# Patient Record
Sex: FEMALE | Race: WHITE
[De-identification: names, ages, dates, MRNs, and addresses within clinical notes are randomized per-mention and may not be internally consistent; named-entity substitution may affect disease eponyms.]

---

## 2017-04-26 ENCOUNTER — HOSPITAL ENCOUNTER (OUTPATIENT)
Dept: HOSPITAL 47 - RADUSWWP | Age: 62
Discharge: HOME | End: 2017-04-26
Attending: FAMILY MEDICINE
Payer: COMMERCIAL

## 2017-04-26 DIAGNOSIS — R92.8: Primary | ICD-10-CM

## 2017-04-26 NOTE — USB
Reason for exam: follow-up at short interval from prior study.



History:

Patient is postmenopausal.



Physical Findings:

Nurse did not find any significant physical abnormalities on exam.



US Breast RT

Right breast ultrasound includes all four quadrants, the retroareolar region and 

axilla. Finding demonstrate no cystic or solid lesion seen. Compared to ultrasound

on 9/1/16 previous findings absent.



These results were verbally communicated with the patient and result sheet given 

to the patient on 04/26/17.





ASSESSMENT: Negative, BI-RAD 1



RECOMMENDATION:

Routine screening mammogram of the right breast in 5 months.

Back on schedule.

## 2019-08-14 ENCOUNTER — HOSPITAL ENCOUNTER (OUTPATIENT)
Dept: HOSPITAL 47 - RADUSWWP | Age: 64
Discharge: HOME | End: 2019-08-14
Attending: FAMILY MEDICINE
Payer: COMMERCIAL

## 2019-08-14 DIAGNOSIS — Z88.5: ICD-10-CM

## 2019-08-14 DIAGNOSIS — R94.5: Primary | ICD-10-CM

## 2019-08-14 PROCEDURE — 76705 ECHO EXAM OF ABDOMEN: CPT

## 2019-09-15 ENCOUNTER — HOSPITAL ENCOUNTER (EMERGENCY)
Dept: HOSPITAL 47 - EC | Age: 64
Discharge: HOME | End: 2019-09-15
Payer: COMMERCIAL

## 2019-09-15 VITALS
HEART RATE: 98 BPM | TEMPERATURE: 98.3 F | RESPIRATION RATE: 18 BRPM | SYSTOLIC BLOOD PRESSURE: 142 MMHG | DIASTOLIC BLOOD PRESSURE: 80 MMHG

## 2019-09-15 DIAGNOSIS — Z79.890: ICD-10-CM

## 2019-09-15 DIAGNOSIS — E07.9: ICD-10-CM

## 2019-09-15 DIAGNOSIS — H54.62: ICD-10-CM
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
## 2019-09-15 DIAGNOSIS — M25.551: ICD-10-CM

## 2019-09-15 DIAGNOSIS — M25.511: Primary | ICD-10-CM

## 2019-09-15 DIAGNOSIS — Z79.899: ICD-10-CM

## 2019-09-15 DIAGNOSIS — Z88.6: ICD-10-CM

## 2019-09-15 DIAGNOSIS — F17.200: ICD-10-CM
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
## 2019-09-15 DIAGNOSIS — M25.811: ICD-10-CM

## 2019-09-15 DIAGNOSIS — I10: ICD-10-CM

## 2019-09-15 PROCEDURE — 73502 X-RAY EXAM HIP UNI 2-3 VIEWS: CPT

## 2019-09-15 PROCEDURE — 99283 EMERGENCY DEPT VISIT LOW MDM: CPT

## 2019-09-15 NOTE — XR
EXAMINATION TYPE: XR shoulder complete RT, XR humerus RT

 

DATE OF EXAM: 9/15/2019

 

CLINICAL HISTORY: Pain from fall injury 2 weeks ago.

 

TECHNIQUE:  Three views of the right shoulder are obtained. 2 views of right humerus.

 

COMPARISON: None. 

 

FINDINGS:  There is no acute fracture/dislocation evident in the right shoulder. Some narrowing gleno
humeral joint.  Moderate to severe narrowing of acromial navicular joint with mild to moderate spurri
ng. Distal acromion morphology is maintained. The visualized ribs are intact and unremarkable. 

 

Images of right humerus show no acute fracture or dislocation. Visualized elbow joint is within tonio
l limits. Overlying soft tissue is unremarkable.

 

IMPRESSION:  There is no acute fracture or dislocation in the right shoulder.

## 2019-09-15 NOTE — XR
EXAMINATION TYPE: XR Hip RT and AP Pelvis

 

DATE OF EXAM: 9/15/2019

 

COMPARISON: NONE

 

HISTORY: Pain from fall injury 2 weeks ago. Lifting injury 2 days ago.

 

TECHNIQUE: A single AP view of the pelvis is obtained. Two views of the right hip are obtained.  

 

FINDINGS:  There is no acute fracture/dislocation evident in the pelvis.  The sacroiliac joints are d
ifficult to visualize in their entirety particularly on the left, likely within normal limits.  The o
verlying soft tissue appears unremarkable. Mild axial joint space loss in both hips is present.

 

Two views of right hip show no acute fracture or dislocation.  No focal lytic or sclerotic lesion see
n in the proximal right femur.  The overlying soft tissue is unremarkable.  

 

IMPRESSION:  There is no acute fracture or dislocation in the pelvis or hip.

## 2019-09-15 NOTE — ED
General Adult HPI





- General


Chief complaint: Extremity Injury, Upper


Stated complaint: side pain


Time Seen by Provider: 09/15/19 15:32


Source: patient, RN notes reviewed


Mode of arrival: ambulatory


Limitations: no limitations





- History of Present Illness


Initial comments: 





64-year-old female presents to the emergency department for a chief complaint of

right hip and shoulder pain.  Patient states her shoulder has hurt for 2 weeks. 

States she had a fall where she fell onto her shoulder 2 weeks ago and since 

then has been having pain.  Patient states that she moved into her new apartment

and was moving heavy boxes 2 days ago when she felt the pain in her right hip.  

States that she has been able to walk on it.  Patient has not taken anything for

pain.  Patient denies any fevers or chills.  Denies any numbness or tingling in 

the right arm or right leg.  Denies any midline back pain.  Patient denies any 

weakness of the lower extremities.Patient has no other complaints at this time 

including shortness of breath, chest pain, abdominal pain, nausea or vomiting, 

headache, or visual changes.





- Related Data


                                Home Medications











 Medication  Instructions  Recorded  Confirmed


 


Hydrochlorothiazide 25 mg PO DAILY 06/15/16 06/16/16


 


Levothyroxine Sodium [Synthroid] 50 mcg PO DAILY 06/15/16 06/16/16


 


amLODIPine BESYLATE [Amlodipine 10 mg PO DAILY 06/15/16 06/16/16





Besylate]   








                                  Previous Rx's











 Medication  Instructions  Recorded


 


Doxepin [SINEquan] 50 mg PO HS PRN #60 cap 06/28/16


 


Doxepin [SINEquan] 100 mg PO HS #120 cap 06/28/16


 


Gabapentin [Neurontin] 600 mg PO BID #120 cap 06/28/16


 


OXcarbazepine [Trileptal] 450 mg PO BID #180 tab 06/28/16


 


Ziprasidone [Geodon] 80 mg PO BID-W/MEALS #60 cap 06/28/16











                                    Allergies











Allergy/AdvReac Type Severity Reaction Status Date / Time


 


ibuprofen [From Motrin] Allergy  Swelling Verified 09/15/19 15:28


 


NSAIDS (Non-Steroidal Allergy  Rash/Hives Verified 09/15/19 15:28





Anti-Inflamma     














Review of Systems


ROS Statement: 


Those systems with pertinent positive or pertinent negative responses have been 

documented in the HPI.





ROS Other: All systems not noted in ROS Statement are negative.





Past Medical History


Past Medical History: Hypertension, Thyroid Disorder


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


History of Any Multi-Drug Resistant Organisms: None Reported


Past Surgical History: Tubal Ligation


Past Anesthesia/Blood Transfusion Reactions: No Reported Reaction


Past Psychological History: Anxiety, Bipolar, Depression, Schizophrenia


Smoking Status: Current every day smoker


Past Alcohol Use History: None Reported


Past Drug Use History: None Reported





- Past Family History


  ** Father


Family Medical History: Unable to Obtain


Additional Family Medical History / Comment(s): Father is alive at age 86 with 

history of diabetes.





  ** Mother


Additional Family Medical History / Comment(s): Mother is alive at age 85 with 

history of hypertension, temporal arteritis, peripheral vascular disease status 

post carotid surgery, blindness in the left eye.





  ** Sister(s)


Additional Family Medical History / Comment(s): Patient has 4 sisters with no 

major medical problems.  Patient has 3 daughters with no major medical problems.





General Exam





- General Exam Comments


Initial Comments: 





Right shoulder: Patient has full range of motion of the right shoulder.  It skin

is intact.  No tenderness noted of the right shoulder.  No bruising or 

ecchymosis.  Radial pulse 2+ and capillary refill less than 2 seconds.   

strength 5 out of 5.  Sensation intact in right upper extremity.


Right hip: Patient has full range motion of the right hip and is ambulatory 

without limping or pain.  No erythema.  Skin is intact in normal-appearing.  DP 

and PT pulses 2+.  Capillary refill less than 2 seconds. 


Limitations: no limitations


General appearance: alert, in no apparent distress


Head exam: Present: atraumatic, normocephalic, normal inspection


Eye exam: Present: normal appearance, PERRL, EOMI.  Absent: scleral icterus, 

conjunctival injection, periorbital swelling


ENT exam: Present: normal exam, mucous membranes moist


Neck exam: Present: normal inspection, full ROM.  Absent: tenderness, 

meningismus, lymphadenopathy


Respiratory exam: Present: normal lung sounds bilaterally.  Absent: respiratory 

distress, wheezes, rales, rhonchi, stridor


Cardiovascular Exam: Present: regular rate, normal rhythm, normal heart sounds. 

Absent: systolic murmur, diastolic murmur, rubs, gallop, clicks


Neurological exam: Present: alert





Course


                                   Vital Signs











  09/15/19





  15:28


 


Temperature 98.3 F


 


Pulse Rate 98


 


Respiratory 18





Rate 


 


Blood Pressure 142/80


 


O2 Sat by Pulse 97





Oximetry 














Medical Decision Making





- Medical Decision Making





64-year-old female presents for right shoulder pain 2 weeks after a fall and 

right hip pain 2 days after moving boxes.  No back pain.  HPI is documented.  

No fevers.  Patient is ambulatory.  Physical exam is unremarkable.  Vitals are 

stable.  Patient is well-appearing.  X-ray of the right shoulder shows no acute 

fracture or dislocation.  There is moderate to severe narrowing of the 

acromioclavicular joint which may be contributing to patient's pain.  X-ray of 

the right hip and pelvis shows no acute fracture or dislocation.  At this time 

pain likely muscular in nature.  Patient has an ALLERGY to Motrin but will take 

Tylenol at home.  Discussed parameters of Tylenol use.  Discussed following up 

with orthopedics in one to 2 days or return here if she has any worsening 

symptoms.





Disposition


Clinical Impression: 


 Shoulder pain, right, Hip pain, right





Disposition: HOME SELF-CARE


Condition: Good


Instructions (If sedation given, give patient instructions):  Shoulder Pain 

(ED), Hip Pain (ED)


Additional Instructions: 


Please take Tylenol for pain.  Please follow-up with primary care and 

orthopedics in 1-2 days.  Please return to the emergency department if you have 

any worsening symptoms.


Is patient prescribed a controlled substance at d/c from ED?: No


Referrals: 


Brendon Jacobs Jr, DO [Primary Care Provider] - 1-2 days


Marlo Smith DO [Doctor of Osteopathic Medicine] - 1-2 days


Time of Disposition: 16:26

## 2019-10-25 ENCOUNTER — HOSPITAL ENCOUNTER (INPATIENT)
Dept: HOSPITAL 47 - EC | Age: 64
LOS: 27 days | Discharge: HOME | DRG: 885 | End: 2019-11-21
Attending: PSYCHIATRY & NEUROLOGY | Admitting: PSYCHIATRY & NEUROLOGY
Payer: COMMERCIAL

## 2019-10-25 VITALS — BODY MASS INDEX: 37.8 KG/M2

## 2019-10-25 DIAGNOSIS — Z82.49: ICD-10-CM

## 2019-10-25 DIAGNOSIS — F25.0: Primary | ICD-10-CM

## 2019-10-25 DIAGNOSIS — E78.00: ICD-10-CM

## 2019-10-25 DIAGNOSIS — E03.9: ICD-10-CM

## 2019-10-25 DIAGNOSIS — E66.9: ICD-10-CM

## 2019-10-25 DIAGNOSIS — Z83.3: ICD-10-CM

## 2019-10-25 DIAGNOSIS — Z79.899: ICD-10-CM

## 2019-10-25 DIAGNOSIS — Z83.518: ICD-10-CM

## 2019-10-25 DIAGNOSIS — Z98.51: ICD-10-CM

## 2019-10-25 DIAGNOSIS — Z79.890: ICD-10-CM

## 2019-10-25 DIAGNOSIS — E78.2: ICD-10-CM

## 2019-10-25 DIAGNOSIS — Z81.8: ICD-10-CM

## 2019-10-25 DIAGNOSIS — I10: ICD-10-CM

## 2019-10-25 DIAGNOSIS — H54.62: ICD-10-CM

## 2019-10-25 DIAGNOSIS — F17.210: ICD-10-CM

## 2019-10-25 PROCEDURE — 80061 LIPID PANEL: CPT

## 2019-10-25 PROCEDURE — 82075 ASSAY OF BREATH ETHANOL: CPT

## 2019-10-25 PROCEDURE — 99285 EMERGENCY DEPT VISIT HI MDM: CPT

## 2019-10-25 PROCEDURE — 96372 THER/PROPH/DIAG INJ SC/IM: CPT

## 2019-10-25 PROCEDURE — 84443 ASSAY THYROID STIM HORMONE: CPT

## 2019-10-25 PROCEDURE — 82248 BILIRUBIN DIRECT: CPT

## 2019-10-25 PROCEDURE — 80053 COMPREHEN METABOLIC PANEL: CPT

## 2019-10-25 PROCEDURE — 85025 COMPLETE CBC W/AUTO DIFF WBC: CPT

## 2019-10-25 PROCEDURE — 83036 HEMOGLOBIN GLYCOSYLATED A1C: CPT

## 2019-10-25 NOTE — ED
Psych HPI





- General


Source: patient


Mode of arrival: ambulatory


Limitations: altered mental status (Patient is manic)





- History of Present Illness


MD Complaint: other


-: unknown


Associated Psychiatric Symptoms: racing thoughts, delusions


Quality: constant


Improves With: none


Worsens With: none





<Tyrell Lei - Last Filed: 10/25/19 05:50>





<Cruzito Amos - Last Filed: 10/25/19 13:43>





- General


Chief Complaint: Psychiatric Symptoms


Stated Complaint: Mental Health


Time Seen by Provider: 10/25/19 05:20





- History of Present Illness


Initial Comments: 





This patient is 64-year-old woman who presents with complaint that the which is 

on warlocks won't stop fighting with her.  She is not able to state how long 

this is been going on.  She states that she is not able to rest at home as there

continuously fighting with her.  The patient denies suicidal or homicidal 

ideation. (Tyrell Lei)





- Related Data


                                Home Medications











 Medication  Instructions  Recorded  Confirmed


 


Levothyroxine Sodium [Synthroid] 50 mcg PO DAILY 06/15/16 10/25/19


 


amLODIPine BESYLATE [Amlodipine 10 mg PO DAILY 06/15/16 10/25/19





Besylate]   


 


Atenolol [Tenormin] 50 mg PO DAILY 10/25/19 10/25/19


 


Atorvastatin Calcium [Lipitor] 10 mg PO DAILY 10/25/19 10/25/19











                                    Allergies











Allergy/AdvReac Type Severity Reaction Status Date / Time


 


ibuprofen [From Motrin] Allergy  Swelling Verified 10/25/19 10:29


 


NSAIDS (Non-Steroidal Allergy  Rash/Hives Verified 10/25/19 10:29





Anti-Inflamma     














Review of Systems


ROS Other: All systems not noted in ROS Statement are negative.


Limitations: ROS unobtainable due to patients medical condition (Patient appears

manic)


Respiratory: Denies: cough, dyspnea


Cardiovascular: Denies: chest pain, palpitations


Gastrointestinal: Denies: abdominal pain, vomiting


Musculoskeletal: Denies: back pain


Neurological: Denies: headache


Psychiatric: Reports: anxiety.  Denies: auditory hallucinations, visual 

hallucinations, homicidal thoughts, suicidal thoughts





<Tyrell Lei - Last Filed: 10/25/19 05:50>


ROS Other: All systems not noted in ROS Statement are negative.





<Cruzito Amos - Last Filed: 10/25/19 13:43>


ROS Statement: 


Those systems with pertinent positive or pertinent negative responses have been 

documented in the HPI.








Past Medical History


Past Medical History: Hypertension, Thyroid Disorder


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


History of Any Multi-Drug Resistant Organisms: None Reported


Past Surgical History: Tubal Ligation


Past Anesthesia/Blood Transfusion Reactions: No Reported Reaction


Past Psychological History: Anxiety, Bipolar, Depression, Schizophrenia


Smoking Status: Current every day smoker


Past Alcohol Use History: None Reported


Past Drug Use History: None Reported





- Past Family History


  ** Father


Family Medical History: Unable to Obtain


Additional Family Medical History / Comment(s): Father is alive at age 86 with 

history of diabetes.





  ** Mother


Additional Family Medical History / Comment(s): Mother is alive at age 85 with 

history of hypertension, temporal arteritis, peripheral vascular disease status 

post carotid surgery, blindness in the left eye.





  ** Sister(s)


Additional Family Medical History / Comment(s): Patient has 4 sisters with no 

major medical problems.  Patient has 3 daughters with no major medical problems.





<Tyrell Lei - Last Filed: 10/25/19 05:50>





General Exam


Limitations: no limitations


General appearance: alert, anxious


Head exam: Present: atraumatic, normocephalic


Eye exam: Present: normal appearance


ENT exam: Present: normal oropharynx


Respiratory exam: Present: normal lung sounds bilaterally.  Absent: respiratory 

distress, wheezes, rales, rhonchi, stridor


Cardiovascular Exam: Present: normal rhythm, tachycardia, normal heart sounds.  

Absent: systolic murmur, diastolic murmur, rubs, gallop


GI/Abdominal exam: Present: soft.  Absent: tenderness, guarding


Neurological exam: Present: alert


Psychiatric exam: Present: anxious, manic.  Absent: flat affect, homicidal 

ideation, suicidal ideation


Skin exam: Present: warm, dry, intact, normal color.  Absent: rash





<Tyrell Lei - Last Filed: 10/25/19 05:50>





Course





                                   Vital Signs











  10/25/19 10/25/19 10/25/19





  05:30 07:25 08:16


 


Temperature 97.6 F  


 


Pulse Rate 121 H 115 H 


 


Respiratory 20 18 16





Rate   


 


Blood Pressure 150/100 160/81 


 


O2 Sat by Pulse 98 98 





Oximetry   














  10/25/19





  11:00


 


Temperature 


 


Pulse Rate 


 


Respiratory 





Rate 


 


Blood Pressure 


 


O2 Sat by Pulse 98





Oximetry 














Medical Decision Making





<Amos,Cruzito - Last Filed: 10/25/19 13:43>





- Medical Decision Making





Patient was seen by mental health services with plans for admission.  Patient 

reevaluated by myself, Dr. Amos.  Positive clinical certificate completed.  

Patient admits to not taking her medication.  Patient admits to not sleeping.  

Patient does have flight of ideas. (Cruzito Amos)





Disposition





<Tyrell Lei - Last Filed: 10/25/19 05:50>


Is patient prescribed a controlled substance at d/c from ED?: No


Decision Time: 13:43





<Cruzito Amos - Last Filed: 10/25/19 13:43>


Clinical Impression: 


 Acute psychosis





Disposition: TRANSFER TO PSYCH HOSP/UNIT


Referrals: 


Brendon Jacobs Jr,  [Primary Care Provider] - 1-2 days

## 2019-10-26 LAB
ALBUMIN SERPL-MCNC: 4.2 G/DL (ref 3.5–5)
ALP SERPL-CCNC: 88 U/L (ref 38–126)
ALT SERPL-CCNC: 61 U/L (ref 9–52)
ANION GAP SERPL CALC-SCNC: 12 MMOL/L
AST SERPL-CCNC: 63 U/L (ref 14–36)
BASOPHILS # BLD AUTO: 0 K/UL (ref 0–0.2)
BASOPHILS NFR BLD AUTO: 1 %
BILIRUB INDIRECT SERPL-MCNC: 0.9 MG/DL (ref 0–1.1)
BILIRUBIN DIRECT+TOT PNL SERPL-MCNC: 0.4 MG/DL (ref 0–0.2)
BUN SERPL-SCNC: 13 MG/DL (ref 7–17)
CALCIUM SPEC-MCNC: 9.4 MG/DL (ref 8.4–10.2)
CHLORIDE SERPL-SCNC: 104 MMOL/L (ref 98–107)
CHOLEST SERPL-MCNC: 166 MG/DL (ref ?–200)
CO2 SERPL-SCNC: 23 MMOL/L (ref 22–30)
EOSINOPHIL # BLD AUTO: 0.1 K/UL (ref 0–0.7)
EOSINOPHIL NFR BLD AUTO: 2 %
ERYTHROCYTE [DISTWIDTH] IN BLOOD BY AUTOMATED COUNT: 4.65 M/UL (ref 3.8–5.4)
ERYTHROCYTE [DISTWIDTH] IN BLOOD: 12.9 % (ref 11.5–15.5)
GLUCOSE SERPL-MCNC: 95 MG/DL (ref 74–99)
HBA1C MFR BLD: 5.7 % (ref 4–6)
HCT VFR BLD AUTO: 43.9 % (ref 34–46)
HDLC SERPL-MCNC: 39 MG/DL (ref 40–60)
HGB BLD-MCNC: 14.7 GM/DL (ref 11.4–16)
LDLC SERPL CALC-MCNC: 106 MG/DL (ref 0–99)
LYMPHOCYTES # SPEC AUTO: 2 K/UL (ref 1–4.8)
LYMPHOCYTES NFR SPEC AUTO: 32 %
MCH RBC QN AUTO: 31.7 PG (ref 25–35)
MCHC RBC AUTO-ENTMCNC: 33.6 G/DL (ref 31–37)
MCV RBC AUTO: 94.3 FL (ref 80–100)
MONOCYTES # BLD AUTO: 0.4 K/UL (ref 0–1)
MONOCYTES NFR BLD AUTO: 6 %
NEUTROPHILS # BLD AUTO: 3.4 K/UL (ref 1.3–7.7)
NEUTROPHILS NFR BLD AUTO: 56 %
PLATELET # BLD AUTO: 179 K/UL (ref 150–450)
POTASSIUM SERPL-SCNC: 4.1 MMOL/L (ref 3.5–5.1)
PROT SERPL-MCNC: 7.7 G/DL (ref 6.3–8.2)
SODIUM SERPL-SCNC: 139 MMOL/L (ref 137–145)
TRIGL SERPL-MCNC: 103 MG/DL (ref ?–150)
WBC # BLD AUTO: 6.1 K/UL (ref 3.8–10.6)

## 2019-10-26 RX ADMIN — ATORVASTATIN CALCIUM SCH MG: 10 TABLET, FILM COATED ORAL at 10:47

## 2019-10-26 RX ADMIN — ATENOLOL SCH MG: 50 TABLET ORAL at 10:47

## 2019-10-26 RX ADMIN — LEVOTHYROXINE SODIUM SCH MCG: 50 TABLET ORAL at 07:15

## 2019-10-26 NOTE — P.CONS
History of Present Illness





- Reason for Consult


Consult date: 10/26/19


Medical management of hypertension hyperlipidemia hypothyroidism





- History of Present Illness


This is a pleasant 64-year-old  female, who was hospitalized for 

delusions.  She has a medical history of hypertension, hyperlipidemia, and 

hypothyroidism.  I've been asked to see her for medical management is diseases. 

She is pleasant and extremely happy at this time.  She denies any significant 

complaints.  Staff have no significant medical concerns about her at this time. 

Routine laboratory studies showed mildly elevated liver function tests.








Review of Systems


All systems: negative





Past Medical History


Past Medical History: Hypertension, Thyroid Disorder


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


History of Any Multi-Drug Resistant Organisms: None Reported


Past Surgical History: Tubal Ligation


Past Anesthesia/Blood Transfusion Reactions: No Reported Reaction


Smoking Status: Current every day smoker





- Past Family History


  ** Father


Family Medical History: Unable to Obtain


Additional Family Medical History / Comment(s): Father is alive at age 86 with 

history of diabetes.





  ** Mother


Additional Family Medical History / Comment(s): Mother is alive at age 85 with 

history of hypertension, temporal arteritis, peripheral vascular disease status 

post carotid surgery, blindness in the left eye.





  ** Sister(s)


Additional Family Medical History / Comment(s): Patient has 4 sisters with no 

major medical problems.  Patient has 3 daughters with no major medical problems.





Medications and Allergies


                                Home Medications











 Medication  Instructions  Recorded  Confirmed  Type


 


Levothyroxine Sodium [Synthroid] 50 mcg PO DAILY 06/15/16 10/25/19 History


 


amLODIPine BESYLATE [Amlodipine 10 mg PO DAILY 06/15/16 10/25/19 History





Besylate]    


 


Atenolol [Tenormin] 50 mg PO DAILY 10/25/19 10/25/19 History


 


Atorvastatin Calcium [Lipitor] 10 mg PO DAILY 10/25/19 10/25/19 History








                                    Allergies











Allergy/AdvReac Type Severity Reaction Status Date / Time


 


ibuprofen [From Motrin] Allergy  Swelling Verified 10/25/19 17:08


 


NSAIDS (Non-Steroidal Allergy  Rash/Hives Verified 10/25/19 17:08





Anti-Inflamma     














Physical Exam


Vitals: 


                                   Vital Signs











  Temp Pulse Pulse Resp BP Pulse Ox


 


 10/26/19 06:26  97.7 F   105 H  18  164/77 


 


 10/25/19 18:27  97 F L   110 H  15  140/95  95


 


 10/25/19 17:38  98 F   110 H  20  140/95  99


 


 10/25/19 16:48  97 F L   110 H  15   95


 


 10/25/19 16:30  97.6 F   112 H  20  185/78  98


 


 10/25/19 15:56   81   16   95








                                Intake and Output











 10/25/19 10/26/19 10/26/19





 22:59 06:59 14:59


 


Other:   


 


  Weight 103.2 kg  














GENERAL: Well-appearing, obese female in no acute distress.


HEAD: Atraumatic, normocephalic.


EYES: Pupils equal round and reactive to light, extraocular movements intact, 

sclera anicteric, conjunctiva are normal.


ENT:nares patent, oropharynx clear without exudates.  Moist mucous membranes.


NECK: Normal range of motion, supple without lymphadenopathy or JVD, no 

thyromegaly


LUNGS: Breath sounds clear to auscultation bilaterally and equal.  No wheezes 

rales or rhonchi.


HEART: Regular rate and rhythm without murmurs, rubs or gallops.S1S2 Normal


ABDOMEN: Soft, nontender, normoactive bowel sounds.  No guarding, no rebound.  

No masses appreciated.


EXTREMITIES: Normal range of motion, no pitting or edema.  No clubbing or 

cyanosis.


NEUROLOGICAL: Cranial nerves II through XII grossly intact.  Normal speech, 

normal gait.


PSYCH:  Significantly elevated mood, normal affect.


SKIN: Warm, Dry, normal turgor, no rashes or lesions noted.





Results


CBC & Chem 7: 


                                 10/26/19 08:41





                                 10/26/19 08:41


Labs: 


                  Abnormal Lab Results - Last 24 Hours (Table)











  10/26/19 Range/Units





  08:41 


 


Delta Bilirubin  0.4 H  (0.0-0.2)  mg/dL


 


AST  63 H  (14-36)  U/L


 


ALT  61 H  (9-52)  U/L


 


LDL Cholesterol, Calc  106 H  (0-99)  mg/dL


 


HDL Cholesterol  39 L  (40-60)  mg/dL














Assessment and Plan


(1) Essential (primary) hypertension


Current Visit: Yes   Status: Acute   Code(s): I10 - ESSENTIAL (PRIMARY) 

HYPERTENSION   SNOMED Code(s): 78014739


   





(2) Mixed hyperlipidemia


Current Visit: Yes   Status: Acute   Code(s): E78.2 - MIXED HYPERLIPIDEMIA   

SNOMED Code(s): 088888208


   





(3) Hypothyroidism


Current Visit: Yes   Status: Acute   Code(s): E03.9 - HYPOTHYROIDISM, 

UNSPECIFIED   SNOMED Code(s): 84635244


   





(4) Abnormal liver function test


Current Visit: Yes   Status: Acute   Code(s): R94.5 - ABNORMAL RESULTS OF LIVER 

FUNCTION STUDIES   SNOMED Code(s): 045477206


   





(5) Acute psychosis


Current Visit: Yes   Status: Acute   Code(s): F23 - BRIEF PSYCHOTIC DISORDER   

SNOMED Code(s): 80132035


   





(6) Schizophrenia


Current Visit: No   Status: Acute   Code(s): F20.9 - SCHIZOPHRENIA, UNSPECIFIED 

 SNOMED Code(s): 77888608


   


Plan: 





We'll repeat liver function tests, she'll be restarted on her medication of 

atenolol and amlodipine for hypertension along with atorvastatin for 

hyperlipidemia and levothyroxine for hypothyroidism.


Thank you for allowing me to participate in her care.

## 2019-10-26 NOTE — P.HP
Psychiatric H&P





- .


H&P Date: 10/26/19


History & Physical: 


                                    Allergies











Allergy/AdvReac Type Severity Reaction Status Date / Time


 


ibuprofen [From Motrin] Allergy  Swelling Verified 10/25/19 17:08


 


NSAIDS (Non-Steroidal Allergy  Rash/Hives Verified 10/25/19 17:08





Anti-Inflamma     








                                   Vital Signs











Temp  97.7 F   10/26/19 06:26


 


Pulse  105 H  10/26/19 06:26


 


Resp  18   10/26/19 06:26


 


BP  164/77   10/26/19 06:26


 


Pulse Ox  95   10/25/19 18:27








                                 Intake & Output











 10/25/19 10/26/19 10/26/19





 18:59 06:59 18:59


 


Weight 103.2 kg  








                             Laboratory Last Values











WBC  6.1 k/uL (3.8-10.6)   10/26/19  08:41    


 


RBC  4.65 m/uL (3.80-5.40)   10/26/19  08:41    


 


Hgb  14.7 gm/dL (11.4-16.0)   10/26/19  08:41    


 


Hct  43.9 % (34.0-46.0)   10/26/19  08:41    


 


MCV  94.3 fL (80.0-100.0)   10/26/19  08:41    


 


MCH  31.7 pg (25.0-35.0)   10/26/19  08:41    


 


MCHC  33.6 g/dL (31.0-37.0)   10/26/19  08:41    


 


RDW  12.9 % (11.5-15.5)   10/26/19  08:41    


 


Plt Count  179 k/uL (150-450)   10/26/19  08:41    


 


Neutrophils %  56 %  10/26/19  08:41    


 


Lymphocytes %  32 %  10/26/19  08:41    


 


Monocytes %  6 %  10/26/19  08:41    


 


Eosinophils %  2 %  10/26/19  08:41    


 


Basophils %  1 %  10/26/19  08:41    


 


Neutrophils #  3.4 k/uL (1.3-7.7)   10/26/19  08:41    


 


Lymphocytes #  2.0 k/uL (1.0-4.8)   10/26/19  08:41    


 


Monocytes #  0.4 k/uL (0-1.0)   10/26/19  08:41    


 


Eosinophils #  0.1 k/uL (0-0.7)   10/26/19  08:41    


 


Basophils #  0.0 k/uL (0-0.2)   10/26/19  08:41    


 


Sodium  139 mmol/L (137-145)   10/26/19  08:41    


 


Potassium  4.1 mmol/L (3.5-5.1)   10/26/19  08:41    


 


Chloride  104 mmol/L ()   10/26/19  08:41    


 


Carbon Dioxide  23 mmol/L (22-30)   10/26/19  08:41    


 


Anion Gap  12 mmol/L  10/26/19  08:41    


 


BUN  13 mg/dL (7-17)   10/26/19  08:41    


 


Creatinine  0.66 mg/dL (0.52-1.04)   10/26/19  08:41    


 


Est GFR (CKD-EPI)AfAm  >90  (>60 ml/min/1.73 sqM)   10/26/19  08:41    


 


Est GFR (CKD-EPI)NonAf  >90  (>60 ml/min/1.73 sqM)   10/26/19  08:41    


 


Glucose  95 mg/dL (74-99)   10/26/19  08:41    


 


Calcium  9.4 mg/dL (8.4-10.2)   10/26/19  08:41    


 


Total Bilirubin  1.3 mg/dL (0.2-1.3)   10/26/19  08:41    


 


Conjugated Bilirubin  0.0 mg/dL (0.0-0.3)   10/26/19  08:41    


 


Unconjugated Bilirubin  0.9 mg/dL (0.0-1.1)   10/26/19  08:41    


 


Delta Bilirubin  0.4 mg/dL (0.0-0.2)  H  10/26/19  08:41    


 


AST  63 U/L (14-36)  H  10/26/19  08:41    


 


ALT  61 U/L (9-52)  H  10/26/19  08:41    


 


Alkaline Phosphatase  88 U/L ()   10/26/19  08:41    


 


Total Protein  7.7 g/dL (6.3-8.2)   10/26/19  08:41    


 


Albumin  4.2 g/dL (3.5-5.0)   10/26/19  08:41    


 


Triglycerides  103 mg/dL (<150)   10/26/19  08:41    


 


Cholesterol  166 mg/dL (<200)   10/26/19  08:41    


 


LDL Cholesterol, Calc  106 mg/dL (0-99)  H  10/26/19  08:41    


 


HDL Cholesterol  39 mg/dL (40-60)  L  10/26/19  08:41    


 


TSH  0.681 mIU/L (0.465-4.680)   10/26/19  08:41    











10/26/19 16:04


IDENTIFYING DATA: 64-year-old   female patient.


HPI: Admitted to the inpatient psychiatric unit on an involuntary basis, 

petition done by registered nurse stating "patient believes that witches and 

warlocks are trying to drug, rape and prostitute us.  She said she fights them 

every day."  Patient reports that she has been battling then goes on to state 

"witches, warlocks sorcerers."  She states that it's a constant heath.  She 

says she feels good when there is a lot of people around her that can direct 

her, but every day is a fight.  She states that she's been sleeping only about 

an hour.  She does her mood lately as great here in great when she hears music. 

When talking about past overdoses she relays that "that's a part of sorcery, 

they're casting spells on you."  Regarding taking medication she says "I'm going

to trust the Lord."  She relays that she is open to trying something else.


PAST PSYCHIATRIC HISTORY: She has a history of multiple overdoses it sounds 

like, no suicide attempts.  She denies any current psychiatrist or counselor.  

She says she has been hospitalized a lot.  She says that she feels much better 

not on medication.  She says she did very well on Prozac.  She did not like 

Depakote or lithium and Lamictal made her want to sleep.  Says she has diagnosis

history of schizophrenia and bipolar disorder.  She does admit to some history 

of hallucinations, auditory hallucinations she says that are the wrong kind.


PMH: Hypertension, thyroid disorder, hypercholesterolemia


ALLERGIES: Ibuprofen, NSAIDs


MEDICATIONS: Maalox when necessary, Norvasc, Tenormin, Lipitor, Synthroid, 

Ativan when necessary, Geodon when necessary


CHEMICAL DEPENDENCY HISTORY: Denies


FAMILY PSYCHIATRIC HISTORY: Relays her schizophrenia and bipolar disorder in the

family.  She says her sister does well on Abilify.


FAMILY CHEMICAL DEPENDENCY HISTORY: Not known at this time.


SOCIAL HISTORY: Relays that she lives by herself, does make reference to living 

with her daughter.  She has been  one time and .  She has 3 

daughters.  She is on Social Security disability.


MENTAL STATUS EXAM: She is alert and cooperative with the interview.  Her speech

is fluent, not necessarily showing any significant rapidity or pressured speech.

 She appears tearful at times.  Regarding her mood she says "great here."  She 

denies any auditory or visual hallucinations.  She denies any bothersome or 

paranoid type thoughts.  She does describe feeling as though she's been battling

witches, warlocks and sorcerers.  She makes reference to previous overdoses 

relating "that's a part of sorcery, they are casting spells on you."  She denies

any thoughts of harm to self or others.  Cognitively she appears to be grossly 

intact.  I do not note any significant disorientation or significant memory 

disturbance.  Her insight has some limitations her judgment shows evidence of 

recent impairment.


STRENGTHS/WEAKNESSES: Strengths-agreeable to initiate some medication treatment;

 weaknesses-recent noncompliance with treatment


INTELLECTUAL FUNCTIONING: Average


IMPRESSIONS: Schizoaffective disorder, bipolar type


PLAN: Patient be admitted to the inpatient psychiatric unit [facility] on involuntary basis.  Second clinical CERT is done.  Abilify will be 

initiated 10 mg daily to help with him psychosis symptoms as well as mood 

stability.  Baseline laboratory workup will be done the patient and medical 

consultation will be ordered.  We will look into support systems.  She will be 

offered multiple different group and activity therapies.  Estimated length of 

stay is 5-7 days.  Prognosis is guarded.  She is placed on SP 15 minute 

precautions.

## 2019-10-27 LAB
ALBUMIN SERPL-MCNC: 4.2 G/DL (ref 3.5–5)
ALP SERPL-CCNC: 97 U/L (ref 38–126)
ALT SERPL-CCNC: 57 U/L (ref 9–52)
ANION GAP SERPL CALC-SCNC: 12 MMOL/L
AST SERPL-CCNC: 53 U/L (ref 14–36)
BUN SERPL-SCNC: 17 MG/DL (ref 7–17)
CALCIUM SPEC-MCNC: 9.4 MG/DL (ref 8.4–10.2)
CHLORIDE SERPL-SCNC: 105 MMOL/L (ref 98–107)
CO2 SERPL-SCNC: 23 MMOL/L (ref 22–30)
GLUCOSE SERPL-MCNC: 106 MG/DL (ref 74–99)
POTASSIUM SERPL-SCNC: 4 MMOL/L (ref 3.5–5.1)
PROT SERPL-MCNC: 7.7 G/DL (ref 6.3–8.2)
SODIUM SERPL-SCNC: 140 MMOL/L (ref 137–145)

## 2019-10-27 RX ADMIN — ATENOLOL SCH MG: 50 TABLET ORAL at 08:16

## 2019-10-27 RX ADMIN — LEVOTHYROXINE SODIUM SCH MCG: 50 TABLET ORAL at 06:19

## 2019-10-27 RX ADMIN — ATORVASTATIN CALCIUM SCH MG: 10 TABLET, FILM COATED ORAL at 08:16

## 2019-10-27 NOTE — P.PN
Progress Note - Text


Progress Note Date: 10/27/19





Patient is seen in cross coverage in today.  She reports that she didn't sleep 

much last night.  She feels like the Abilify is making her feel tired.  She 

makes reference that at times some seeing people that other people can't see, 

relays a doesn't really bother her.





Mental status exam: She is alert and cooperative with the interview.  She laughs

at times during the session.  She describes her mood as "tired."  She denies any

thoughts of harm to self or others.  She admits to visual hallucinations of 

seeing people that other people can't see at times.  She does not verbalize any 

auditory hallucinations.  She was seen in the hallway yesterday seated in a 

chair and saying some repetitive things.





Plan: Patient will be maintained on Abilify, we'll change the scheduling 2 at 

bedtime to minimize any daytime sedation and perhaps this will help her sleep at

night.  Continue to monitor for any medication side effects and monitor her 

ongoing response to treatment.

## 2019-10-28 RX ADMIN — LEVOTHYROXINE SODIUM SCH MCG: 50 TABLET ORAL at 06:06

## 2019-10-28 RX ADMIN — ATORVASTATIN CALCIUM SCH MG: 10 TABLET, FILM COATED ORAL at 08:19

## 2019-10-28 RX ADMIN — ATENOLOL SCH MG: 50 TABLET ORAL at 08:19

## 2019-10-28 NOTE — P.PN
Progress Note - Text





Interval history: The patient is found in group she follows me to an interview 

room.  She states that her mood is fine.  She was admitted for acute symptoms of

psychosis.  I reviewed the EPS assessment as well as a psychiatric evaluation.  

She was brought in reporting she had concerns about witches and warlocks.  She 

is religiously preoccupied.  She indicates today that she felt unsafe at home 

but feels safe in the hospital.  She was seen by Dr. Zaragoza and Abilify 10 mg

daily was initiated.  She states that she is never been on that medication but 

had been on Geodon invega and lithium in the past.  She states her sister is 

treated with Abilify and that works well for her.  The patient indicates that 

she is eating and sleeping.





Mental status exam: The patient is alert she is dressed in her own clothing she 

has a disheveled appearance her shirt is sustained.  She is carrying some 

belongings with her.  Eye contact is appropriate affect is overly bright.  

Several times she states "God bless you in your family".  She indicates having 

concerns about witches and warlocks but does not provide any specific 

information.  Thought process is somewhat disorganized at times with brief 

questions she can be more linear.  She demonstrates no verbal or physical 

aggressiveness.  She reports no suicidal or homicidal ideation intent or plan.  

Insight and judgment are impaired.





Plan: The patient will continue on the Abilify we will titrate the dose to 15 mg

daily to further address her acute symptoms of psychosis.  We will monitor her 

for safety.  She is encouraged to continue participating in groups attend her 

ADLs.  We will monitor vital signs.  Blood pressure elevated we will continue to

monitor.  She requires continued psychiatric hospitalization.

## 2019-10-29 RX ADMIN — LEVOTHYROXINE SODIUM SCH MCG: 50 TABLET ORAL at 10:00

## 2019-10-29 RX ADMIN — ATORVASTATIN CALCIUM SCH MG: 10 TABLET, FILM COATED ORAL at 10:00

## 2019-10-29 RX ADMIN — ATENOLOL SCH MG: 50 TABLET ORAL at 10:01

## 2019-10-29 NOTE — P.PN
Progress Note - Text





Interval history: The patient is found in group she follows me to an interview 

room.  She indicates she is worried and concerned.  She states she is tired.  

Staff reported she slept only 3 hours.  She is very concerned about being unsafe

due to sorcery and witchcraft.  She states that she hopes she can remember all 

the codes that she will need when she leaves.  She reports that she is also 

experiencing positive input from the Lord which helps reassure her.  She 

deferred during her meeting with her  yesterday.  She has been c

ooperative with group participation as well as medications.





Mental status exam: The patient's an overweight female appearing her stated age.

 She is alert she stressor own clothing.  She identifies a mood of being tired 

and fearful.  She becomes tearful when describing concerns of sorcery and 

witchcraft.  She feels that those phenomenon endanger her.  Nonspecifically she 

refers to codes that she will need to remember and she is afraid she will forget

them.  Insight and judgment are impaired by his symptoms of psychosis.  She 

demonstrates no verbal or physical aggressiveness.  She is cooperative and 

easily directed.  She reports no suicidal or homicidal ideation intent or plan. 

Thought process can be linear but also can become tangential was spontaneous 

speech.  She endorses racing thoughts.  Affect can be mildly labile.





Plan: The patient will continue on her current psychotropic medication.  We will

monitor for symptoms of psychosis and we'll monitor for her ability to maintain 

her ADLs.  Sleep has been impaired in the evening we will monitor that further. 

She is encouraged to fully participate in groups.  Vital signs reviewed.  She 

requires continued psychiatric hospitalization due to her acute psychosis.

## 2019-10-30 RX ADMIN — ATENOLOL SCH MG: 50 TABLET ORAL at 09:40

## 2019-10-30 RX ADMIN — LEVOTHYROXINE SODIUM SCH MCG: 50 TABLET ORAL at 05:49

## 2019-10-30 RX ADMIN — Medication SCH MG: at 21:12

## 2019-10-30 RX ADMIN — ATORVASTATIN CALCIUM SCH MG: 10 TABLET, FILM COATED ORAL at 09:40

## 2019-10-30 NOTE — P.PN
Progress Note - Text





Interval history: The patient is found in group she follows me to an interview 

room.  She states that she slept last night however staff reports she slept 2 

hours.  She indicates that she is napping during the day.  She states she does 

this because she has to pray during the night.  Appetite stable.  She reports 

feeling safe appear but continues to have thoughts of sorcery.  She goes on a 

tangent about not walking on people's graves because this can cause the dead to 

rise.  She has no questions or concerns regarding her medication.





Mental status exam: The patient is an overweight female appearing her stated 

age.  She is pleasant cooperative.  Affect is overly bright.  Her speech is 

mildly pressured.  She often states "God bless you".  Thought process lacks 

organization at times.  She states at times she feels confused.  She has a 

bright smiling affect that is expansive.  She reports no suicidal or homicidal 

ideation.  She is endorsing auditory hallucinations in the form of hearing the 

Lord's voice and other unspecified voices.  She continues to have some paranoid 

and persecutory thinking.  Insight and judgment are impaired.





Plan: The patient will continue on the Abilify we will likely plan to titrate 

that further this week.  We will add melatonin 5 mg at bedtime to assist with 

sleep.  We will monitor her for safety.  Vital signs reviewed blood pressure is 

fluctuating.  She is encouraged to continue participating in all groups.  She 

requires continued psychiatric hospitalization.

## 2019-10-31 RX ADMIN — Medication SCH MG: at 20:00

## 2019-10-31 RX ADMIN — ATORVASTATIN CALCIUM SCH MG: 10 TABLET, FILM COATED ORAL at 09:32

## 2019-10-31 RX ADMIN — LEVOTHYROXINE SODIUM SCH MCG: 50 TABLET ORAL at 07:06

## 2019-10-31 RX ADMIN — ATENOLOL SCH MG: 50 TABLET ORAL at 09:32

## 2019-10-31 NOTE — P.PN
Progress Note - Text





Interval history: The patient is found in group she follows me to an interview 

room.  She indicates she is doing fine.  She first indicates that she is 

troubled that it is halloween but later states she is fine with that.  She 

reports feeling safe in the hospital.  Continues to have concerns regarding 

witchcraft and sorcery.  She felt she slept throughout the night staff reported 

she slept 2 hours.  She has been compliant with the Abilify.  She believes that 

the Abilify will help her as her sister is on that medication.





Mental status exam: The patient is an overweight female appearing her stated 

age.  She is dressed in her own clothing.  Hygiene grooming adequate.  Eye 

contact is appropriate.  She is more guarded today in conversation.  She has 

less spontaneous speech.  She provides more brief answers.  She continues to 

endorse a delusional thought content.  There are continues to be disorganization

of thought.  She can become tangential at times.  Affect is somewhat variable.  

She will have exaggerated laughter at times she is almost tearful twice during 

the interaction.  She demonstrates no verbal or physical aggressiveness.  She 

demonstrates no involuntary repetitive movements.  Insight and judgment are 

impaired.  She reports no suicidal or homicidal ideation intent or plan.  She is

oriented to person place and date.





Plan: The patient will continue on the Abilify we will titrate the dose to 20 mg

daily.  We will continue to monitor her for safety.  Vital signs reviewed.  She 

is encouraged to fully participate in the milieu.  She continues to be acutely 

symptomatic and requires inpatient psychiatric care.

## 2019-11-01 RX ADMIN — LEVOTHYROXINE SODIUM SCH MCG: 50 TABLET ORAL at 05:57

## 2019-11-01 RX ADMIN — ATENOLOL SCH MG: 50 TABLET ORAL at 09:19

## 2019-11-01 RX ADMIN — Medication SCH MG: at 20:17

## 2019-11-01 RX ADMIN — ATORVASTATIN CALCIUM SCH MG: 10 TABLET, FILM COATED ORAL at 09:19

## 2019-11-01 NOTE — P.PN
Progress Note - Text





Interval history: The patient is found in her room sleeping she refuses to get 

up in follows me to an interview room.  Staff reported she slept only 1 hour 

last evening.  She is known to take naps throughout the day however.  She has 

been compliant with the Abilify.  We briefly reviewed other mood stabilizers she

has trialed in the past.  She states she refuses to take lithium or Depakote 

again.  She does not recall trying Lamictal and is agreeable to starting that 

this evening.





Mental status exam: The patient is lying in bed she was sleeping but was 

verbally arousable.  She is covered in sheets in a blanket.  Eye contact is 

intermittent.  She remains hyperverbal.  She was observed earlier this morning 

ambulating in the hallway without difficulty.  She was hyperverbal at the front 

desk.  Thought process can be disorganized at times.  She continues to have a 

delusional thought content and is fearful for her safety.  She reports no 

suicidal or homicidal ideation.  Insight and judgment limited.  She demonstrates

no verbal or physical aggressiveness.  She demonstrates no repetitive 

involuntary movements.





Plan: The patient will continue on the Abilify at its current dose.  We will 

consider titrating further.  We will initiate Lamictal 25 mg at bedtime and 

we'll titrate that further.  We're adding the Lamictal to assist the Abilify in 

stabilizing mood.  The patient requires continued psychiatric hospitalization 

for safety reasons.  We will monitor her for safety and encourage full 

participation in the milieu.  She is encouraged not to sleep during the day so 

as to improve her sleep behavior at night.  Vital signs reviewed.

## 2019-11-02 RX ADMIN — LEVOTHYROXINE SODIUM SCH MCG: 50 TABLET ORAL at 06:09

## 2019-11-02 RX ADMIN — ATENOLOL SCH MG: 50 TABLET ORAL at 08:12

## 2019-11-02 RX ADMIN — Medication SCH MG: at 20:41

## 2019-11-02 RX ADMIN — ATORVASTATIN CALCIUM SCH MG: 10 TABLET, FILM COATED ORAL at 08:12

## 2019-11-02 NOTE — P.PN
Progress Note - Text


Progress Note Date: 11/02/19





IDENTIFICATION DATA: 


64-year-old female admitted to the inpatient psychiatric unit on an involuntary 

basis with acute psychosis.





INTERVAL HISTORY:  


She reports feeling anxious saying am I going to fail  . She also reports 

seeing a ball going up and down inside her room. Other wise she says her 

medications have been helping her and has been going to all her assigned groups.

She reports good sleep and appetite. She denies current symptoms of depression. 

No side effects reported. 





MENTAL STATUS EXAMINATION: 


64  year old woman. She is obese with magianal grooming. Her speech and thought 

process are goal directed. Her mood is reported as rested and affect broad. She 

denies current auditory hallucinations reports  visual hallucinations. Denies 

paranoid ideations. Denies current suicidal or homicidal ideations. She is alert

and oriented x 4. Her insight and judgement are improving. 





ASSESSMENT 


Schizoaffective disorder, bipolar type





 PLAN: 


Continue 


Abilify 20mg po daily


Lamictal 25mg po qhs


Melatonin 5mg po qhs

## 2019-11-03 RX ADMIN — LEVOTHYROXINE SODIUM SCH MCG: 50 TABLET ORAL at 06:15

## 2019-11-03 RX ADMIN — Medication SCH MG: at 20:51

## 2019-11-03 RX ADMIN — ATENOLOL SCH MG: 50 TABLET ORAL at 07:58

## 2019-11-03 RX ADMIN — ATORVASTATIN CALCIUM SCH MG: 10 TABLET, FILM COATED ORAL at 07:58

## 2019-11-03 NOTE — P.PN
Progress Note - Text


Progress Note Date: 11/03/19





IDENTIFICATION DATA: 


64-year-old female admitted to the inpatient psychiatric unit on an involuntary 

basis with acute psychosis.





INTERVAL HISTORY:  


She says she doesnt sleep well at night and says they say I slept only for 

hours, you have to check with them, She however reports feeling rested when she

wakes up next day. She says she doesnt wake up in the morning and sleeps 

through late in the day but unable to tell how late. She reports good appetite. 

She says she likes going to groups. She reports seeing and hearing people 

talking and making hissing noises and creating lot of confusion inside her room.

She denies paranoid ideations. She denies current symptoms of depression and 

denies current suicidal or homicidal ideations. She says she is complaint with 

medications and no side effects reported. She says there is no luck in [place] 

[place], only blessings in [facility]. 





MENTAL STATUS EXAMINATION: 


64  year old woman. She is obese with fair grooming and hygiene . Her speech and

thought process are goal directed. Her mood is reported as good  and affect cons

tricted. She reports  auditory hallucinations and   visual hallucinations. 

Denies paranoid ideations. Denies current suicidal or homicidal ideations. She 

is alert and oriented x 4. Her insight and judgement are improving. 





ASSESSMENT 


Schizoaffective disorder, bipolar type





 PLAN: 


She continues to meet criteria for inpatient hospitalization


Continue 


Abilify 20mg po daily


Lamictal 25mg po qhs


Melatonin 5mg po qhs

## 2019-11-04 RX ADMIN — ATORVASTATIN CALCIUM SCH MG: 10 TABLET, FILM COATED ORAL at 09:37

## 2019-11-04 RX ADMIN — ACETAMINOPHEN PRN MG: 325 TABLET, FILM COATED ORAL at 21:56

## 2019-11-04 RX ADMIN — LEVOTHYROXINE SODIUM SCH MCG: 50 TABLET ORAL at 05:55

## 2019-11-04 RX ADMIN — ATENOLOL SCH MG: 50 TABLET ORAL at 09:37

## 2019-11-04 RX ADMIN — ACETAMINOPHEN PRN MG: 325 TABLET, FILM COATED ORAL at 16:21

## 2019-11-04 RX ADMIN — Medication SCH MG: at 21:57

## 2019-11-04 NOTE — P.PN
Progress Note - Text





Interval history: The patient is found in the front that she follows me to an 

interview room.  She indicates her mood is fine today.  Staff reported that over

the weekend she tried to insert a pen into an electrical outlet.  The patient 

states she has no reason for doing it but it's what she had to do.  She 

continues to sleep poorly in the evening.  She continues to be religiously 

preoccupied she demonstrates some disorganization of her thoughts.  She has no 

questions or concerns regarding medications.  No side effects reported.





Mental status exam: The patient is an obese female appearing her stated age.  

She is dressed in hospital gowns.  She is mildly disheveled.  Eye contact is 

appropriate she is pleasant cooperative.  She continues to verbalize Judaism 

type delusions with some disorganized delusional thought.  She frequently states

"God bless you and your family".  Her symptoms of psychosis impair her insight 

and judgment.  She demonstrates no verbal or physical aggressiveness.  She has a

smiling affect throughout our interaction.  She demonstrates no involuntary 

repetitive movements.





Plan: The patient will continue on her current medication however I will titrate

the Abilify to 30 mg daily.  We have just started the Lamictal we will titrate 

that further as well.  We will monitor her for safety and encourage 

participation in the milieu.  She requires continued psychiatric hospitalization

for acute safety reasons.

## 2019-11-05 RX ADMIN — ACETAMINOPHEN PRN MG: 325 TABLET, FILM COATED ORAL at 11:11

## 2019-11-05 RX ADMIN — ATENOLOL SCH MG: 50 TABLET ORAL at 08:51

## 2019-11-05 RX ADMIN — ACETAMINOPHEN PRN MG: 325 TABLET, FILM COATED ORAL at 04:46

## 2019-11-05 RX ADMIN — LEVOTHYROXINE SODIUM SCH MCG: 50 TABLET ORAL at 06:59

## 2019-11-05 RX ADMIN — ATORVASTATIN CALCIUM SCH MG: 10 TABLET, FILM COATED ORAL at 08:51

## 2019-11-05 RX ADMIN — ACETAMINOPHEN PRN MG: 325 TABLET, FILM COATED ORAL at 18:26

## 2019-11-05 NOTE — P.PN
Progress Note - Text





Interval history: The patient is found in her room she is asked to follow me to 

an interview room.  She stops out of her room wearing only a blanket with parts 

of her body exposed.  She is instructed to return to her room and she is brought

more hospital gowns.  The patient indicates she slept last night staff reports 

she slept 2 hours.  She demonstrated disorganized thoughts.  She has no 

questions or concerns regarding her psychotropic medication.





Mental status exam: The patient is an obese female appearing her stated age.  

She is alert.  She demonstrates disorganization of thought and some confusion.  

She endorses concerns regarding witchcraft and sorcery.  She indicates feeling 

unsafe at times.  She is reporting no suicidal or homicidal thoughts.  She 

demonstrates symptoms of danielle.  Insight and judgment are poor.  She 

demonstrates no verbal or physical aggressiveness.  She has been directable.  

Affect is labile.





Plan: The patient will be continued on her current psychotropic medication we 

will plan to titrate the Lamictal further.  We will continue the Abilify is 

written.  She requires continued psychiatric hospitalization for safety reasons.

 Vital signs reviewed.  We will encourage appropriate participation in the 

milieu.

## 2019-11-06 RX ADMIN — ACETAMINOPHEN PRN MG: 325 TABLET, FILM COATED ORAL at 02:00

## 2019-11-06 RX ADMIN — ATENOLOL SCH MG: 50 TABLET ORAL at 09:08

## 2019-11-06 RX ADMIN — PALIPERIDONE SCH MG: 3 TABLET, EXTENDED RELEASE ORAL at 21:25

## 2019-11-06 RX ADMIN — Medication SCH MG: at 21:25

## 2019-11-06 RX ADMIN — ACETAMINOPHEN PRN MG: 325 TABLET, FILM COATED ORAL at 10:35

## 2019-11-06 RX ADMIN — ATORVASTATIN CALCIUM SCH MG: 10 TABLET, FILM COATED ORAL at 09:08

## 2019-11-06 RX ADMIN — Medication SCH: at 09:39

## 2019-11-06 RX ADMIN — LEVOTHYROXINE SODIUM SCH MCG: 50 TABLET ORAL at 06:12

## 2019-11-06 NOTE — P.PN
Progress Note - Text





Interval history: The patient is found in the hallway she follows me to an 

interview room.  She states that she is "heath wary" when asked to clarify she 

begins making statements that are paranoid in nature.  We reviewed her current 

psychotropic medications.  It appears that the Abilify has demonstrated very 

little efficacy over the course of her admission.  We discussed transitioning 

her off of Abilify onto invega and she is agreeable.  She indicates that she is 

eating well.  She is attempting to attend groups.  Staff report that she 

continues to demonstrate the same psychotic thoughts and behavior is when she 

was admitted.





Mental status exam: The patient is an overweight female appearing her stated 

age.  She is dressed in layers today.  Eye contact is good.  She does have a 

pressured quality in terms of speech.  She demonstrates some tangential 

thinking.  She spontaneously describes paranoid and persecutory thinking.  In

sight and judgment are impaired.  She demonstrates no verbal or physical 

aggressiveness.  She demonstrates no involuntary repetitive movements.  She 

reports no suicidal or homicidal ideation intent or plan.  She requires 

redirection on the mental health unit.





Plan: The patient has demonstrated insufficient response to the Abilify over the

course of this admission.  We will cross taper her off of Abilify and initiate 

invega 3 mg at bedtime.  We will continue the Lamictal as written and consider 

titrating that further.  The patient requires continued psychiatric 

hospitalization due to the dysfunction caused by her psychosis and manic 

symptoms.  We will monitor for safety.  Vital signs reviewed.

## 2019-11-07 RX ADMIN — ACETAMINOPHEN PRN MG: 325 TABLET, FILM COATED ORAL at 22:59

## 2019-11-07 RX ADMIN — ATENOLOL SCH MG: 50 TABLET ORAL at 08:51

## 2019-11-07 RX ADMIN — Medication SCH MG: at 21:09

## 2019-11-07 RX ADMIN — PALIPERIDONE SCH MG: 3 TABLET, EXTENDED RELEASE ORAL at 21:10

## 2019-11-07 RX ADMIN — ACETAMINOPHEN PRN MG: 325 TABLET, FILM COATED ORAL at 16:49

## 2019-11-07 RX ADMIN — ATORVASTATIN CALCIUM SCH MG: 10 TABLET, FILM COATED ORAL at 08:51

## 2019-11-07 RX ADMIN — LEVOTHYROXINE SODIUM SCH MCG: 50 TABLET ORAL at 06:04

## 2019-11-07 RX ADMIN — ACETAMINOPHEN PRN MG: 325 TABLET, FILM COATED ORAL at 03:05

## 2019-11-07 NOTE — P.PN
Progress Note - Text





Interval history: The patient is found in the hallway she follows me to an 

interview room.  She again did not sleep well last night but does nap throughout

the day.  We started to cross taper her off of Abilify and placed her on an 

invega starting last evening.  She has no questions or concerns regarding her 

medication.  She states that she continues to attend groups.  She is religiously

preoccupied.  She states that she is still "heath wary".





Mental status exam: The patient is an overweight female appearing her stated 

age.  She is dressed in her own clothing wearing layers.  She is carrying a 

blanket with her as well.  She is pleasant cooperative easily directed.  She 

demonstrates some disorganization of thought process specifically she is 

tangential.  She demonstrates Mu-ism preoccupation as well as paranoid and 

persecutory thinking.  She reports no suicidal or homicidal ideation.  Again her

symptoms of psychosis impair her insight and judgment.  She demonstrates no 

verbal or physical aggressiveness.  She has an expansive affect.  She 

demonstrates no involuntary repetitive movements.





Plan: The patient will continue on her current psychotropic medications.  We 

will continue to adjust the Invega and Abilify as part of the cross titration 

process.  We will monitor her for safety we will monitor her sleep at night.  

She is encouraged to participate in the milieu.  Vital signs reviewed they're 

within normal limits.

## 2019-11-08 RX ADMIN — ACETAMINOPHEN PRN MG: 325 TABLET, FILM COATED ORAL at 14:55

## 2019-11-08 RX ADMIN — Medication SCH MG: at 20:31

## 2019-11-08 RX ADMIN — ATORVASTATIN CALCIUM SCH MG: 10 TABLET, FILM COATED ORAL at 09:16

## 2019-11-08 RX ADMIN — PALIPERIDONE SCH MG: 6 TABLET, EXTENDED RELEASE ORAL at 20:31

## 2019-11-08 RX ADMIN — LEVOTHYROXINE SODIUM SCH MCG: 50 TABLET ORAL at 06:05

## 2019-11-08 RX ADMIN — ACETAMINOPHEN PRN MG: 325 TABLET, FILM COATED ORAL at 05:05

## 2019-11-08 RX ADMIN — ACETAMINOPHEN PRN MG: 325 TABLET, FILM COATED ORAL at 20:31

## 2019-11-08 RX ADMIN — ATENOLOL SCH MG: 50 TABLET ORAL at 09:16

## 2019-11-08 NOTE — P.PN
Progress Note - Text





Interval history: The patient is found in group she follows me to an interview 

room.  She reports some frustration with her peers.  She indicates one of the 

male peers is "up to no good" when she is asked about her safety.  She finds 

herself antagonized by a female peer and states "she thinks she's my doctor".  

She thinks that the Lamictal is causing weight gain.  We discussed this concern.

 We again discussed the plan to cross taper off of Abilify and onto invega.  She

offers no opposition.  She indicates she slept better last night staff reported 

she slept 3 hours.





Mental status exam: The patient is an obese female appearing her stated age.  

She is dressed in hospital gowns and is covered with a bedsheet.  She has a 

bright smiling affect that is expansive.  She has exaggerated laughter at times.

 She reports concerns about being unsafe at times.  She reports feelings of 

frustration and irritability related to peers.  She reports no suicidal or 

homicidal ideation.  She does continue to describe paranoid and persecutory 

thinking.  He demonstrates no verbal or physical aggressiveness.  She 

demonstrates no involuntary repetitive movements.  Insight and judgment are 

still adversely impacted by current symptoms of psychosis.





Plan: We will reduce the Abilify to 5 mg daily and we will increase the Invega 

to 6 mg at bedtime.  We will monitor her for safety and encourage participation 

in the milieu.  She has not demonstrated sufficient improvement and requires 

continued psychiatric hospitalization.  She would be unsafe if discharged to a 

lesser level of care.  Vital signs reviewed.

## 2019-11-09 RX ADMIN — ACETAMINOPHEN PRN MG: 325 TABLET, FILM COATED ORAL at 19:10

## 2019-11-09 RX ADMIN — PALIPERIDONE SCH MG: 6 TABLET, EXTENDED RELEASE ORAL at 20:46

## 2019-11-09 RX ADMIN — ACETAMINOPHEN PRN MG: 325 TABLET, FILM COATED ORAL at 01:31

## 2019-11-09 RX ADMIN — ATORVASTATIN CALCIUM SCH MG: 10 TABLET, FILM COATED ORAL at 09:28

## 2019-11-09 RX ADMIN — Medication SCH MG: at 20:46

## 2019-11-09 RX ADMIN — LEVOTHYROXINE SODIUM SCH MCG: 50 TABLET ORAL at 05:13

## 2019-11-09 RX ADMIN — ACETAMINOPHEN PRN MG: 325 TABLET, FILM COATED ORAL at 09:28

## 2019-11-09 RX ADMIN — ATENOLOL SCH MG: 50 TABLET ORAL at 09:27

## 2019-11-09 NOTE — P.PN
Progress Note - Text





Interval history: The patient is found in her room she was sleeping she follows 

me to an interview room.  She indicates she feels tired.  She has no questions 

regarding her psychotropic medications and we again reviewed the upcoming 

changes.  Again she did not sleep at night and she is sleeping during the day.  

She indicates that she ate breakfast.  She has not been attending groups this 

morning.  She describes feelings of being unsafe even on the mental health unit.





Mental status exam: The patient is an obese female appearing her stated age.  

She is dressed in hospital gowns.  She is pleasant cooperative she is a smiling 

affect throughout the session.  She continues to have delusional thought mainly 

any paranoid and persecutory fashion as well as Restorationist preoccupation.  She is

endorsing no auditory or visual hallucinations.  She is reporting no suicidal or

homicidal ideation.  Insight and judgment remain impaired.  She continues to be 

distractible in terms of thought process.  She can demonstrate some tangential 

thinking at times.  She demonstrates no verbal or physical aggressiveness she 

demonstrates no involuntary repetitive movements.





Plan: The patient will continue on her current psychotropic medication.  We will

discontinue the Abilify this weekend.  We will titrate the Lamictal to 50 mg at 

bedtime.  We will monitor her for safety and encourage participation in the 

milieu.  She requires continued psychiatric hospitalization for symptoms of 

danielle and psychosis.  Idle signs reviewed.

## 2019-11-10 RX ADMIN — ACETAMINOPHEN PRN MG: 325 TABLET, FILM COATED ORAL at 09:10

## 2019-11-10 RX ADMIN — ATORVASTATIN CALCIUM SCH MG: 10 TABLET, FILM COATED ORAL at 09:10

## 2019-11-10 RX ADMIN — ACETAMINOPHEN PRN MG: 325 TABLET, FILM COATED ORAL at 16:53

## 2019-11-10 RX ADMIN — ACETAMINOPHEN PRN MG: 325 TABLET, FILM COATED ORAL at 03:55

## 2019-11-10 RX ADMIN — PALIPERIDONE SCH MG: 6 TABLET, EXTENDED RELEASE ORAL at 20:35

## 2019-11-10 RX ADMIN — ACETAMINOPHEN PRN MG: 325 TABLET, FILM COATED ORAL at 21:52

## 2019-11-10 RX ADMIN — LEVOTHYROXINE SODIUM SCH MCG: 50 TABLET ORAL at 05:52

## 2019-11-10 RX ADMIN — ATENOLOL SCH MG: 50 TABLET ORAL at 09:09

## 2019-11-10 RX ADMIN — Medication SCH MG: at 20:36

## 2019-11-10 NOTE — P.PN
Progress Note - Text





Interval history: The patient is found in her room she follows me to an 

interview room.  She indicates her mood is good.  She states that she was out of

her room this morning I have not seen her much this afternoon so far.  She was 

recorded to a slept 4 hours last evening which is the most she slept at night 

during this hospitalization.  She has no questions or concerns regarding the 

medication.  She states that she was looking through a magazine and ripped out 

and add for a cell phone and put it under her pillow.  She asks me today if that

was meant for her.





Mental status exam: The patient is an overweight female appearing her stated 

age.  She is dressed in hospital gowns and is covered with a bedsheet.  She is 

pleasant cooperative and easily directable.  Eye contact is appropriate speech 

is fluent and spontaneous nonpressured.  She reports no suicidal or homicidal 

ideation intent or plan.  She demonstrates no verbal or physical aggressiveness.

 She demonstrates no involuntary repetitive movements.  She does not 

spontaneously discussed the topic but states she is still concerned about 

witchcraft and sorcery.  She did spontaneously asked me about an idea of 

reference.  Insight and judgment still limited.





Plan: The patient is demonstrating some mild improvement in that she is speaking

of her symptoms of psychosis less spontaneously.  We will continue the invega as

written.  We will monitor for any side effects.  She is encouraged to fully 

participate in the milieu.  We will monitor her for safety.  We will continue 

monitoring vital signs.

## 2019-11-11 RX ADMIN — ATENOLOL SCH MG: 50 TABLET ORAL at 08:52

## 2019-11-11 RX ADMIN — PALIPERIDONE SCH MG: 6 TABLET, EXTENDED RELEASE ORAL at 20:27

## 2019-11-11 RX ADMIN — Medication SCH MG: at 20:27

## 2019-11-11 RX ADMIN — ATORVASTATIN CALCIUM SCH MG: 10 TABLET, FILM COATED ORAL at 08:52

## 2019-11-11 RX ADMIN — ACETAMINOPHEN PRN MG: 325 TABLET, FILM COATED ORAL at 04:05

## 2019-11-11 RX ADMIN — LEVOTHYROXINE SODIUM SCH MCG: 50 TABLET ORAL at 06:28

## 2019-11-11 RX ADMIN — IBUPROFEN PRN MG: 200 TABLET, FILM COATED ORAL at 10:50

## 2019-11-11 RX ADMIN — ACETAMINOPHEN PRN MG: 325 TABLET, FILM COATED ORAL at 23:22

## 2019-11-11 NOTE — P.PN
Progress Note - Text





Interval history: The patient is found in the [facility] in group she follows 

me to an interview room.  She states that she is having more significant 

arthritic pain throughout her body.  She typically uses Advil at home.  In terms

of mood She reports feeling good today.  Staff reported she slept 3 hours.  She 

has been eating.  She has no questions or concerns regarding her psychotropic 

medication. 





Mental status exam: The patient is an overweight female appearing her stated 

age.  She is dressed in hospital gowns and wearing a sheet over top.  She is 

pleasant cooperative.  Eye contact is appropriate.  She is smiling throughout 

the session she will have some exaggerated laughter at times.  She reports no 

suicidal or homicidal ideation intent or plan.  She endorses no auditory or 

visual hallucinations although they may persist.  When asked about her previous 

thoughts of feeling unsafe she states "no those are gone now".  Again minutes 

likely she is underreporting.  She demonstrates no tangential thinking during 

our brief session today.  No reports of any behavioral disturbance.  Insight and

judgment slowly improving.  She demonstrates no verbal or physical 

aggressiveness she demonstrates no involuntary repetitive movements.





Plan: The patient will continue on her current medication.  We will continue to 

evaluate her acute safety risk.  She reports no thoughts of self injury but just

a week ago she had demonstrated some unsafe behavior that was concerning.  I 

will consider titrating the Invega further if needed.  Vital signs are reviewed 

they're within normal limits.

## 2019-11-12 RX ADMIN — Medication SCH MG: at 20:40

## 2019-11-12 RX ADMIN — IBUPROFEN PRN MG: 200 TABLET, FILM COATED ORAL at 05:53

## 2019-11-12 RX ADMIN — ATENOLOL SCH MG: 50 TABLET ORAL at 09:01

## 2019-11-12 RX ADMIN — LEVOTHYROXINE SODIUM SCH MCG: 50 TABLET ORAL at 05:51

## 2019-11-12 RX ADMIN — ACETAMINOPHEN PRN MG: 325 TABLET, FILM COATED ORAL at 16:21

## 2019-11-12 RX ADMIN — IBUPROFEN PRN MG: 200 TABLET, FILM COATED ORAL at 12:59

## 2019-11-12 RX ADMIN — PALIPERIDONE SCH MG: 6 TABLET, EXTENDED RELEASE ORAL at 20:40

## 2019-11-12 RX ADMIN — ACETAMINOPHEN PRN MG: 325 TABLET, FILM COATED ORAL at 09:02

## 2019-11-12 RX ADMIN — ATORVASTATIN CALCIUM SCH MG: 10 TABLET, FILM COATED ORAL at 09:00

## 2019-11-12 NOTE — P.PN
Progress Note - Text





Interval history: The patient is found in group she follows me to an interview 

room.  She reports she is doing well.  She slept only 2 hours last night but 

again she does sleep during the day.  When asked about racing thoughts she 

states that she does feel pressured to do everything on time.  She states that 

she figured out the "phone thing" referring to the idea of reference she spoke 

of yesterday.  She remains compliant with her medication.  We discussed 

transitioning to Invega Sustenna and she is agreeable.  She has given social 

work a contact for a different daughter, Tish.





Mental status exam: The patient is an overweight female appearing her stated 

age.  She reports that her mood is good affect is bright and expansive.  She is 

frequently smiling during the interaction.  She reports no suicidal or homicidal

ideation intent or plan.  She does have some tangential thinking.  She 

demonstrates no verbal or physical aggressiveness.  She demonstrates no 

involuntary repetitive movements.  Insight and judgment improved during the 

hospitalization but still limited.  She is endorsing no auditory or visual 

hallucinations.  She does continue to have some paranoid and persecutory 

thinking but she does not spontaneously describe it.





Plan: The patient will continue on invega we will consider transitioning her to 

the Invega Sustenna injection.  Social work is trying to contact a family 

member.  We will continue to monitor the patient for safety she is encouraged 

participate in the milieu.  She requires continued psychiatric hospitalization 

while she stabilizes.  Vital signs reviewed.

## 2019-11-13 RX ADMIN — IBUPROFEN PRN MG: 200 TABLET, FILM COATED ORAL at 06:06

## 2019-11-13 RX ADMIN — Medication SCH MG: at 21:16

## 2019-11-13 RX ADMIN — ACETAMINOPHEN PRN MG: 325 TABLET, FILM COATED ORAL at 01:13

## 2019-11-13 RX ADMIN — LEVOTHYROXINE SODIUM SCH MCG: 50 TABLET ORAL at 06:04

## 2019-11-13 RX ADMIN — ATENOLOL SCH MG: 50 TABLET ORAL at 09:19

## 2019-11-13 RX ADMIN — IBUPROFEN PRN MG: 200 TABLET, FILM COATED ORAL at 23:35

## 2019-11-13 RX ADMIN — ACETAMINOPHEN PRN MG: 325 TABLET, FILM COATED ORAL at 21:16

## 2019-11-13 RX ADMIN — PALIPERIDONE SCH MG: 3 TABLET, EXTENDED RELEASE ORAL at 21:16

## 2019-11-13 RX ADMIN — ATORVASTATIN CALCIUM SCH MG: 10 TABLET, FILM COATED ORAL at 09:19

## 2019-11-13 RX ADMIN — IBUPROFEN PRN MG: 200 TABLET, FILM COATED ORAL at 15:40

## 2019-11-13 NOTE — P.PN
Progress Note - Text





Interval history: The patient is found in the hallway she follows me to an 

interview room.  She indicates she had a rougher day yesterday due to other 

patients negative attitudes during group.  Otherwise she continues to feel that 

she is improving.  She states that she is looking forward to going home but 

states "I hope it safe there".  When asked to specify she states "because of 

other people".  Social work was able to reach one of her daughters via phone and

provided some information regarding the patient's known baseline function.  We 

reviewed the patient's psychotropic medications.  We will initiate Invega 

Sustenna today.





Mental status exam: The patient is an alert overweight female appearing her 

stated age.  She is dressed in hospital attire and wearing a sheet over top.  

She reports her mood is not as good.  He expresses some concerns about being 

unsafe upon discharge.  She has a constricted affect today.  She is verbose but 

directable.  Thought process can still be tangential at times.  She is reporting

no suicidal or homicidal ideation intent or plan.  She does spontaneously 

described thoughts of possibly being unsafe upon discharge and this concerns 

her.  She reports no verbal or physical aggressiveness she demonstrates no 

involuntary repetitive movements.  She continues to be oriented to person place 

and date.  She denies experiencing any auditory or visual hallucinations.





Plan: The patient will receive the Invega Sustenna injection today of 234 mg IM 

I will reduce the oral dose to 3 mg at bedtime.  We will give the second dose in

4-7 days.  We will continue to monitor her for safety and encourage full 

participation in the milieu.  We will continue to monitor for any residual signs

of psychosis and danielle.  Vital signs reviewed.  She continues to sleep poorly at

night and sleeps during the day to some extent we'll encourage increasing 

daytime activity.

## 2019-11-14 RX ADMIN — LEVOTHYROXINE SODIUM SCH MCG: 50 TABLET ORAL at 05:40

## 2019-11-14 RX ADMIN — ACETAMINOPHEN PRN MG: 325 TABLET, FILM COATED ORAL at 16:24

## 2019-11-14 RX ADMIN — ATORVASTATIN CALCIUM SCH MG: 10 TABLET, FILM COATED ORAL at 07:37

## 2019-11-14 RX ADMIN — PALIPERIDONE SCH MG: 3 TABLET, EXTENDED RELEASE ORAL at 20:08

## 2019-11-14 RX ADMIN — Medication SCH MG: at 20:08

## 2019-11-14 RX ADMIN — ACETAMINOPHEN PRN MG: 325 TABLET, FILM COATED ORAL at 03:28

## 2019-11-14 RX ADMIN — IBUPROFEN PRN MG: 200 TABLET, FILM COATED ORAL at 07:38

## 2019-11-14 RX ADMIN — ATENOLOL SCH MG: 50 TABLET ORAL at 07:37

## 2019-11-14 NOTE — P.PN
Progress Note - Text





Interval history: The patient is found in her room she follows me to an 

interview room.  She reports that her mood was rough this morning but it is 

improving.  She has questions regarding her medication and those were addressed.

 She indicates that she continues to experience an auditory hallucination and 

states it will be "instantaneous".  She states it will either be admitting or a 

number that she quickly hears.  She feels safe here she is worried that she will

not feel safe at home.  She states that with a TV antenna a phone and pull cords

she might be okay.  She states that once home she is concerned about others 

trying to harm her or come into her apartment, she told on people.





Mental status exam: The patient is an overweight female is pleasant cooperative.

 She has a bright expansive affect with exaggerated smiling and laughter.  She 

reports feeling safe here in the hospital she describes brief hallucinations as 

noted above no visual hallucinations.  No report of any behavioral disturbances.

 She demonstrates no verbal or physical aggressiveness she demonstrates no 

involuntary repetitive movements.  She is dressed in hospital gowns and a sheet 

over top.  She reports no suicidal or homicidal ideation intent or plan.  

Symptoms of psychosis are primarily paranoid nature where she is worried about 

her safety.  Insight and judgment impaired.





Plan: The patient will continue on her current medication.  We will plan to give

the next Invega Sustenna injection in the next 3-4 days.  We will continue to 

monitor her for safety.  She requires continued psychiatric hospitalization.  

Vital signs reviewed.

## 2019-11-15 RX ADMIN — ACETAMINOPHEN PRN MG: 325 TABLET, FILM COATED ORAL at 03:40

## 2019-11-15 RX ADMIN — ATORVASTATIN CALCIUM SCH MG: 10 TABLET, FILM COATED ORAL at 07:46

## 2019-11-15 RX ADMIN — ATENOLOL SCH MG: 50 TABLET ORAL at 07:46

## 2019-11-15 RX ADMIN — IBUPROFEN PRN MG: 200 TABLET, FILM COATED ORAL at 10:48

## 2019-11-15 RX ADMIN — ACETAMINOPHEN PRN MG: 325 TABLET, FILM COATED ORAL at 17:58

## 2019-11-15 RX ADMIN — IBUPROFEN PRN MG: 200 TABLET, FILM COATED ORAL at 21:39

## 2019-11-15 RX ADMIN — LEVOTHYROXINE SODIUM SCH MCG: 50 TABLET ORAL at 05:39

## 2019-11-15 RX ADMIN — PALIPERIDONE SCH MG: 3 TABLET, EXTENDED RELEASE ORAL at 21:39

## 2019-11-15 RX ADMIN — Medication SCH MG: at 21:38

## 2019-11-15 NOTE — P.PN
Progress Note - Text





Interval history: The patient is found in group she follows me to an interview 

room.  She reports that her mood is fine.  She states that she had some trouble 

sleeping last night however staff recorded she slept 4 hours.  She does not feel

tired this morning.  She indicates that she is eating.  She has no questions or 

concerns regarding her psychotropic medication.  She indicates she feels safe 

here in the hospital.  In terms of feeling safe outside of the hospital she 

states "I don't fear what man can do to me anymore".  When asked to explain 

further she declines and just repeats the same statement.  She does say that she

doesn't want to be alone walking on the sidewalk and she can't take public 

transportation.





Mental status exam: The patient is alert she is dressed in hospital gown she is 

covered with a sheet.  Eye contact is appropriate.  Again she's pleasant 

cooperative and easily directed.  She reports no thoughts of harming herself or 

others.  She reports feeling safe in the hospital she indicates she no longer 

has any paranoid thoughts regarding being outside of the hospital but quickly 

describes fears of being on the sidewalk alone or in a public bus.  She 

demonstrates no verbal or physical aggressiveness.  She demonstrates no 

involuntary repetitive movements.  Insight and judgment slowly improving.





Plan: The patient will continue on her current psychotropic medication.  We will

plan to give the second Invega Sustenna injection next week.  We will monitor 

her for safety and encourage participation in the milieu.  Vital signs reviewed.

## 2019-11-16 RX ADMIN — Medication SCH MG: at 21:03

## 2019-11-16 RX ADMIN — PALIPERIDONE SCH MG: 3 TABLET, EXTENDED RELEASE ORAL at 21:03

## 2019-11-16 RX ADMIN — ATENOLOL SCH MG: 50 TABLET ORAL at 07:59

## 2019-11-16 RX ADMIN — IBUPROFEN PRN MG: 200 TABLET, FILM COATED ORAL at 09:15

## 2019-11-16 RX ADMIN — LEVOTHYROXINE SODIUM SCH MCG: 50 TABLET ORAL at 06:55

## 2019-11-16 RX ADMIN — ACETAMINOPHEN PRN MG: 325 TABLET, FILM COATED ORAL at 14:44

## 2019-11-16 RX ADMIN — ATORVASTATIN CALCIUM SCH MG: 10 TABLET, FILM COATED ORAL at 07:59

## 2019-11-16 RX ADMIN — IBUPROFEN PRN MG: 200 TABLET, FILM COATED ORAL at 18:01

## 2019-11-16 RX ADMIN — ACETAMINOPHEN PRN MG: 325 TABLET, FILM COATED ORAL at 00:36

## 2019-11-16 NOTE — P.PN
Progress Note - Text


Progress Note Date: 11/16/19





Interval history: Patient was seen wandering the hallways and appeared to have a

bright affect.  Patient was directable and agreeable to seek a writer.  Patient 

offered no new complaints and states that she is feeling "happy" today patient 

claims that she has been going to groups and trying to stay positive.  Patient 

reports no overnight complaints and claims that the medications are helping her 

with her thoughts.  Patient was agreeable to continue on with the second 

injection this coming week.  She states that she slept well last night and has 

fair appetite and energy. At this time patient denies any suicidal or homicidal 

ideations intent or plan. Denies any Auditory or visual hallucinations. Patient 

denies any side effects from the medications and has been compliant with meds. 





Mental status exam: 


General Appearance: Patient appears to be stated age is alert, pleasant, and 

cooperative.  Improving hygiene and grooming.


Behavior: No agitated behavior. Patient is calm and directable appropriate 

behavior.


Speech: Patient's speech is fluent and nonpressured. 


Mood/Affect: Mood is "happy", affect is congruent and bright


Suicidality/Homicidality:  Patient denies having any suicidal or homicidal 

ideation intent or plan.  


Perceptions: Patient denies any auditory or visual hallucinations.  


Though content/process: There is no evidence of any delusional thought content 

and thought process is linear and goal-directed. 


Memory and concentration: AOX3, grossly intact for the purposes of this session


Judgment and insight: improving





Assessment/Plan: Continue with current diagnosis. Patient continues to meet 

criteria for inpatient psychiatric admission for symptom stabilization and 

safety.Patient will be maintained on current psychotropic medication regimen.  

Patient is due for her second long-acting injection dose this coming week.  

Monitor for medication compliance and for any psychotropic medication side 

effects.  Will continue to monitor ongoing response to treatment.

## 2019-11-17 RX ADMIN — ACETAMINOPHEN PRN MG: 325 TABLET, FILM COATED ORAL at 00:43

## 2019-11-17 RX ADMIN — PALIPERIDONE SCH MG: 3 TABLET, EXTENDED RELEASE ORAL at 20:52

## 2019-11-17 RX ADMIN — ACETAMINOPHEN PRN MG: 325 TABLET, FILM COATED ORAL at 15:05

## 2019-11-17 RX ADMIN — IBUPROFEN PRN MG: 200 TABLET, FILM COATED ORAL at 04:39

## 2019-11-17 RX ADMIN — ACETAMINOPHEN PRN MG: 325 TABLET, FILM COATED ORAL at 06:36

## 2019-11-17 RX ADMIN — LEVOTHYROXINE SODIUM SCH MCG: 50 TABLET ORAL at 05:51

## 2019-11-17 RX ADMIN — ATORVASTATIN CALCIUM SCH MG: 10 TABLET, FILM COATED ORAL at 08:45

## 2019-11-17 RX ADMIN — ATENOLOL SCH MG: 50 TABLET ORAL at 08:44

## 2019-11-17 RX ADMIN — Medication SCH MG: at 20:52

## 2019-11-17 RX ADMIN — ACETAMINOPHEN PRN MG: 325 TABLET, FILM COATED ORAL at 20:53

## 2019-11-17 NOTE — P.PN
Progress Note - Text


Progress Note Date: 11/17/19





Interval history: Patient was seen wandering the hallways after eating lunch and

appeared to have a bright affect and was agreeable to speak to writer in the 

office. Patient offered no new complaints today however did state that last 

night "I couldn't sleep at all".  Patient was noted by staff to have slept 4+ 

hours however.  Patient claimed that the medications are helping her with her 

thoughts and is continuing to be compliant with the medications.  Patient spoke 

about interacting with other patients on the unit and has been going to groups. 

Patient agrees that she will be ready for her next long-acting dose of the 

injection.  She claims that she has fair appetite and energy. At this time 

patient denies any suicidal or homicidal ideations intent or plan. Denies any 

Auditory or visual hallucinations. Patient denies any side effects from the 

medications and has been compliant with meds. 





Mental status exam: 


General Appearance: Patient appears to be stated age is alert, pleasant, and 

cooperative.  Improving hygiene and grooming.


Behavior: No agitated behavior. Patient is calm and directable appropriate 

behavior.


Speech: Patient's speech is fluent and nonpressured. 


Mood/Affect: Mood is "good", affect is congruent and bright


Suicidality/Homicidality:  Patient denies having any suicidal or homicidal 

ideation intent or plan.  


Perceptions: Patient denies any auditory or visual hallucinations.  


Though content/process: There is no evidence of any delusional thought content 

and thought process is linear and goal-directed. 


Memory and concentration: AOX3, grossly intact for the purposes of this session


Judgment and insight: improving mildly.





Assessment/Plan: Continue with current diagnosis. Patient continues to meet 

criteria for inpatient psychiatric admission for symptom stabilization and 

safety.Patient will be maintained on current psychotropic medication regimen.  

Patient is due for her second long-acting injection dose this coming week.  

Monitor for medication compliance and for any psychotropic medication side 

effects.  Will continue to monitor ongoing response to treatment.

## 2019-11-18 RX ADMIN — IBUPROFEN PRN MG: 200 TABLET, FILM COATED ORAL at 12:58

## 2019-11-18 RX ADMIN — ACETAMINOPHEN PRN MG: 325 TABLET, FILM COATED ORAL at 09:04

## 2019-11-18 RX ADMIN — IBUPROFEN PRN MG: 200 TABLET, FILM COATED ORAL at 01:44

## 2019-11-18 RX ADMIN — LEVOTHYROXINE SODIUM SCH MCG: 50 TABLET ORAL at 06:48

## 2019-11-18 RX ADMIN — ACETAMINOPHEN PRN MG: 325 TABLET, FILM COATED ORAL at 15:10

## 2019-11-18 RX ADMIN — Medication SCH MG: at 21:22

## 2019-11-18 RX ADMIN — ATENOLOL SCH MG: 50 TABLET ORAL at 09:04

## 2019-11-18 RX ADMIN — ATORVASTATIN CALCIUM SCH MG: 10 TABLET, FILM COATED ORAL at 09:04

## 2019-11-18 RX ADMIN — PALIPERIDONE SCH MG: 3 TABLET, EXTENDED RELEASE ORAL at 21:21

## 2019-11-18 RX ADMIN — ACETAMINOPHEN PRN MG: 325 TABLET, FILM COATED ORAL at 19:38

## 2019-11-18 RX ADMIN — IBUPROFEN PRN MG: 200 TABLET, FILM COATED ORAL at 21:21

## 2019-11-18 NOTE — P.PN
Progress Note - Text





Interval history: The patient is found in group she follows me to an interview 

room.  She indicates her mood is good.  She asked questions about her 

psychotropic medication.  We do plan to give the next Invega Sustenna injection 

tomorrow.  She has been sleeping approximate 4 hours a night which is the most 

we have recorded so far.  She is attending groups.  Staff report that she's been

pleasant cooperative and directable.  We feel that she has been describing 

symptoms of psychosis less spontaneously.  Social work has placed a call with 

the patient's family again specifically her daughter to facilitate further 

discharge planning.





Mental status exam: The patient is a morbidly obese female appearing her stated 

age.  She is pleasant cooperative.  Affect is bright.  She has spontaneous 

speech.  There is some exaggerated laughter but again she is easily directed in 

the session.  She reports no suicidal or homicidal ideation intent or plan she 

reports no auditory or visual hallucinations.  Symptoms of psychosis still 

persistent in residual form but she has not been acting on those thoughts or 

spontaneously describing them.  Insight and judgment slowly improving.  She 

demonstrates no repetitive involuntary movements.  She demonstrates no verbal or

physical aggressiveness.





Plan: The patient will continue on her current medications we will give the 

Invega Sustenna injection tomorrow of 156 mg.  Vital signs reviewed.  I 

anticipate she will be appropriate for discharge sometime this week once we 

coordinate more with her family and set up outpatient services.  We will 

continue to monitor her for safety.

## 2019-11-19 RX ADMIN — LEVOTHYROXINE SODIUM SCH MCG: 50 TABLET ORAL at 05:55

## 2019-11-19 RX ADMIN — ACETAMINOPHEN PRN MG: 325 TABLET, FILM COATED ORAL at 12:34

## 2019-11-19 RX ADMIN — IBUPROFEN PRN MG: 200 TABLET, FILM COATED ORAL at 05:55

## 2019-11-19 RX ADMIN — ATENOLOL SCH MG: 50 TABLET ORAL at 09:09

## 2019-11-19 RX ADMIN — PALIPERIDONE SCH MG: 3 TABLET, EXTENDED RELEASE ORAL at 21:01

## 2019-11-19 RX ADMIN — ACETAMINOPHEN PRN MG: 325 TABLET, FILM COATED ORAL at 18:48

## 2019-11-19 RX ADMIN — Medication SCH MG: at 21:01

## 2019-11-19 RX ADMIN — IBUPROFEN PRN MG: 200 TABLET, FILM COATED ORAL at 14:55

## 2019-11-19 RX ADMIN — ATORVASTATIN CALCIUM SCH MG: 10 TABLET, FILM COATED ORAL at 09:09

## 2019-11-19 RX ADMIN — IBUPROFEN PRN MG: 200 TABLET, FILM COATED ORAL at 21:03

## 2019-11-19 RX ADMIN — ACETAMINOPHEN PRN MG: 325 TABLET, FILM COATED ORAL at 02:59

## 2019-11-20 VITALS — RESPIRATION RATE: 18 BRPM

## 2019-11-20 RX ADMIN — ACETAMINOPHEN PRN MG: 325 TABLET, FILM COATED ORAL at 12:26

## 2019-11-20 RX ADMIN — Medication SCH MG: at 21:22

## 2019-11-20 RX ADMIN — ATORVASTATIN CALCIUM SCH MG: 10 TABLET, FILM COATED ORAL at 09:18

## 2019-11-20 RX ADMIN — ACETAMINOPHEN PRN MG: 325 TABLET, FILM COATED ORAL at 00:22

## 2019-11-20 RX ADMIN — IBUPROFEN PRN MG: 200 TABLET, FILM COATED ORAL at 04:38

## 2019-11-20 RX ADMIN — ATENOLOL SCH MG: 50 TABLET ORAL at 09:19

## 2019-11-20 RX ADMIN — IBUPROFEN PRN MG: 200 TABLET, FILM COATED ORAL at 15:28

## 2019-11-20 RX ADMIN — ACETAMINOPHEN PRN MG: 325 TABLET, FILM COATED ORAL at 06:25

## 2019-11-20 RX ADMIN — LEVOTHYROXINE SODIUM SCH MCG: 50 TABLET ORAL at 06:01

## 2019-11-20 RX ADMIN — ACETAMINOPHEN PRN MG: 325 TABLET, FILM COATED ORAL at 17:57

## 2019-11-20 NOTE — P.PN
Progress Note - Text





Interval history: The patient is found in the hallway she follows me to an 

interview room.  She indicates her mood is good.  She reportedly slept 4 hours 

last evening.  Staff report that the patient has been pleasant and cooperative. 

Social work continues to try to reach family members to coordinate discharge 

planning.  The patient has no questions or concerns regarding her psychotropic 

medication.  She is looking forward to being discharged soon.





Mental status exam: The patient is an obese female appearing her stated age.  

She is pleasant cooperative.  She is dressed in her own clothing hygiene 

grooming adequate.  She indicates her mood is good affect is bright.  She has 

spontaneous speech.  She demonstrates no tangential thinking loose associations 

or flight of ideas.  She appropriately response to questions asked.  She is no 

longer spontaneously describing any paranoid or persecutory thoughts.  Those may

persist in residual form however.  There is no objective evidence of psychosis 

as she participated in the conversation.  Insight and judgment are improving.  

She demonstrates no verbal or physical aggressiveness she demonstrates no 

involuntary repetitive movements.





Plan: The patient will be continued on her current medication however we will 

discontinue the oral Invega.  We will plan to discharge her in the next 1-2 

days.  She will follow up with community mental health and have ACT services.  

Vital signs reviewed.  We will continue to monitor her for safety.  Social work 

will continue to attempt to collaborate with her family.

## 2019-11-21 VITALS — TEMPERATURE: 97.7 F

## 2019-11-21 VITALS — SYSTOLIC BLOOD PRESSURE: 133 MMHG | HEART RATE: 112 BPM | DIASTOLIC BLOOD PRESSURE: 93 MMHG

## 2019-11-21 RX ADMIN — ACETAMINOPHEN PRN MG: 325 TABLET, FILM COATED ORAL at 05:49

## 2019-11-21 RX ADMIN — IBUPROFEN PRN MG: 200 TABLET, FILM COATED ORAL at 03:39

## 2019-11-21 RX ADMIN — ATORVASTATIN CALCIUM SCH MG: 10 TABLET, FILM COATED ORAL at 09:15

## 2019-11-21 RX ADMIN — ATENOLOL SCH MG: 50 TABLET ORAL at 09:15

## 2019-11-21 RX ADMIN — LEVOTHYROXINE SODIUM SCH MCG: 50 TABLET ORAL at 05:49

## 2019-11-21 NOTE — P.DS
Providers


Date of admission: 


10/25/19 14:52





Expected date of discharge: 11/21/19


Attending physician: 


Edwardo Lorenz





Consults: 





                                        





10/25/19 13:43


Consult Physician Routine 


   Consulting Provider: Brendon Jacobs Jr


   Consult Reason/Comments: Follow Up H & P


   Do you want consulting provider notified?: Yes











Primary care physician: 


Brendon Jacobs








- Discharge Diagnosis(es)


(1) Schizoaffective disorder, bipolar type


Current Visit: Yes   Status: Acute   Priority: High   


Hospital Course: 





Brief summary of admission note: This patient is a 64-year-old  female 

who is admitted to the mental health unit involuntarily for acute symptoms of 

psychosis and danielle.  The petition had stated the patient believes "witches and 

warlocks are trying to drug rate and prostitute us.  She said she fights them 

every day."  The patient described very poor sleep energy had been increased she

felt euphoric at times.  For full details please refer to Dr. Zaragoza's 

psychiatric evaluation dated 10/26/2019.





Summary of hospital course: The patient was initially evaluated by Dr. Zaragoza.  She was diagnosed with schizoaffective disorder bipolar type and 

started on Abilify 10 mg daily.  I assumed care of the patient the following 

Monday.  We did continue the Abilify and eventually the dose was titrated to 30 

mg daily.  After numerous days of using that medication however there was no 

observed clinical benefit.  We decided to cross taper off of the Abilify and 

initiate invega which was titrated to 6 mg at bedtime.  After numerous days of 

being on that medication and adding Lamictal she began to stabilize.  She has 

received both initiation injections of invega sustenna and her next dose is due 

12/17/2019.  The patient remained pleasant and cooperative throughout the entire

stay.  She may still have residual symptoms of psychosis but she is no longer 

spontaneously speaking of them.  Her symptoms of danielle have improved greatly.  

She is able to appropriately participate in a conversation and is able to remain

seated in the chair.  She has interacted well with peers.  She demonstrated no 

aggressive behavior.  She was seen by internal medicine for routine history and 

physical exam.  Social work met with the patient numerous times regarding 

discharge planning and completing their psychosocial assessment.  Several 

attempts were made to involve her daughters.  Social work was able to finally 

reach them to facilitate a support meeting and transportation home.  We 

discussed the importance of her participating with the act team with [facility].  At this time the patient is sufficiently stabilized to 

transition to outpatient care.  The patient was admitted on a petition and 

clinical certificate a second clinical certificate was completed.  He deferral 

conference was arranged and at that meeting she deferred a court hearing.





Mental status exam: The patient is an obese female appearing her stated age.  

She is dressed in her own clothing.  Hygiene grooming adequate.  Eye contact is 

appropriate speech is fluent spontaneous nonpressured.  Affect is bright but 

much more appropriate compared to time of admission.  She reports her mood is 

good she denies having any hopelessness thinking she endorses no suicidal 

ideation intent or plan.  She endorses no homicidal ideation intent or plan.  

She is reporting no auditory or visual hallucinations or any specific delusions.

 I suspect that she will still has some residual Moravian type and paranoid 

delusions but she is no longer speaking of the spontaneously.  Her function has 

improved greatly as her symptoms of psychosis are attenuated.  She demonstrates 

no verbal or physical aggressiveness she demonstrates no involuntary repetitive 

movements.  She is oriented to person place and date.





Impressions


1.  Schizoaffective disorder bipolar type





Plan: The patient will be discharged mental health unit today to return in her 

own apartment.  She will be screened by [facility] for act team 

services.  She will follow up with [facility] for outpatient mental

healthcare.  She will continue on Lamictal 50 mg at bedtime this may require 

further titration.  She has received both initiation doses of invega sustenna 

and her next dose will be due on 12/17/2019 using the 117 mg strength.  At this 

time there is no imminent safety risk the patient is appropriate for transition 

to outpatient care.  Social work has been attempting to contact the patient's 

family to arrange a support meeting and transportation at the time of discharge.

 The patient is instructed to return to the hospital with any acute safety 

concerns.


Patient Condition at Discharge: Stable





Plan - Discharge Summary


Discharge Rx Participant: No


New Discharge Prescriptions: 


New


   lamoTRIgine [LaMICtal] 50 mg PO HS #60 tab


   Paliperidone IM [Invega Sustenna] 117 mg IM Q28D #1 syr





Continue


   Levothyroxine Sodium [Synthroid] 50 mcg PO DAILY


   amLODIPine BESYLATE [Amlodipine Besylate] 10 mg PO DAILY


   Atorvastatin Calcium [Lipitor] 10 mg PO DAILY


   Atenolol [Tenormin] 50 mg PO DAILY


Discharge Medication List





Levothyroxine Sodium [Synthroid] 50 mcg PO DAILY 06/15/16 [History]


amLODIPine BESYLATE [Amlodipine Besylate] 10 mg PO DAILY 06/15/16 [History]


Atenolol [Tenormin] 50 mg PO DAILY 10/25/19 [History]


Atorvastatin Calcium [Lipitor] 10 mg PO DAILY 10/25/19 [History]


Paliperidone IM [Invega Sustenna] 117 mg IM Q28D #1 syr 11/21/19 [Rx]


lamoTRIgine [LaMICtal] 50 mg PO HS #60 tab 11/21/19 [Rx]








Follow up Appointment(s)/Referral(s): 


[facility] [Outside] - 11/27/19 8:30 am (Appointment w/ Magdalena)


Brendon Jacobs Jr,  [Primary Care Provider] - 1-2 days


Activity/Diet/Wound Care/Special Instructions: 


Activity and diet as tolerated.  No guns or weapons in the home.  Refrain from 

alcohol and street drugs not prescribed by your physicians.  Take all 

medications as prescribed, and attend all follow up appointments as scheduled.  

If in need of medication refills, please go to your Primary care physician, or 

your out patient psychiatric provider.  If in crisis, please go to the nearest 

ER for an evaluation, or call [phone].

## 2020-05-20 ENCOUNTER — HOSPITAL ENCOUNTER (INPATIENT)
Dept: HOSPITAL 47 - EC | Age: 65
LOS: 14 days | Discharge: HOME | DRG: 885 | End: 2020-06-03
Attending: PSYCHIATRY & NEUROLOGY | Admitting: PSYCHIATRY & NEUROLOGY
Payer: COMMERCIAL

## 2020-05-20 VITALS — BODY MASS INDEX: 44.8 KG/M2

## 2020-05-20 DIAGNOSIS — Z79.890: ICD-10-CM

## 2020-05-20 DIAGNOSIS — F41.8: ICD-10-CM

## 2020-05-20 DIAGNOSIS — Z82.49: ICD-10-CM

## 2020-05-20 DIAGNOSIS — Z91.19: ICD-10-CM

## 2020-05-20 DIAGNOSIS — Z88.8: ICD-10-CM

## 2020-05-20 DIAGNOSIS — H54.62: ICD-10-CM

## 2020-05-20 DIAGNOSIS — Z88.6: ICD-10-CM

## 2020-05-20 DIAGNOSIS — Z79.899: ICD-10-CM

## 2020-05-20 DIAGNOSIS — Z91.128: ICD-10-CM

## 2020-05-20 DIAGNOSIS — R45.851: ICD-10-CM

## 2020-05-20 DIAGNOSIS — F94.0: ICD-10-CM

## 2020-05-20 DIAGNOSIS — F17.210: ICD-10-CM

## 2020-05-20 DIAGNOSIS — Z83.3: ICD-10-CM

## 2020-05-20 DIAGNOSIS — F20.9: Primary | ICD-10-CM

## 2020-05-20 DIAGNOSIS — Z60.2: ICD-10-CM

## 2020-05-20 DIAGNOSIS — I10: ICD-10-CM

## 2020-05-20 DIAGNOSIS — T43.96XA: ICD-10-CM

## 2020-05-20 LAB
ANION GAP SERPL CALC-SCNC: 11 MMOL/L
BASOPHILS # BLD AUTO: 0 K/UL (ref 0–0.2)
BASOPHILS NFR BLD AUTO: 0 %
BUN SERPL-SCNC: 14 MG/DL (ref 7–17)
CALCIUM SPEC-MCNC: 9.6 MG/DL (ref 8.4–10.2)
CHLORIDE SERPL-SCNC: 109 MMOL/L (ref 98–107)
CO2 SERPL-SCNC: 18 MMOL/L (ref 22–30)
EOSINOPHIL # BLD AUTO: 0.1 K/UL (ref 0–0.7)
EOSINOPHIL NFR BLD AUTO: 1 %
ERYTHROCYTE [DISTWIDTH] IN BLOOD BY AUTOMATED COUNT: 4.34 M/UL (ref 3.8–5.4)
ERYTHROCYTE [DISTWIDTH] IN BLOOD: 12.8 % (ref 11.5–15.5)
GLUCOSE SERPL-MCNC: 98 MG/DL (ref 74–99)
HCT VFR BLD AUTO: 42.8 % (ref 34–46)
HGB BLD-MCNC: 14.6 GM/DL (ref 11.4–16)
LYMPHOCYTES # SPEC AUTO: 2.9 K/UL (ref 1–4.8)
LYMPHOCYTES NFR SPEC AUTO: 30 %
MCH RBC QN AUTO: 33.6 PG (ref 25–35)
MCHC RBC AUTO-ENTMCNC: 34.1 G/DL (ref 31–37)
MCV RBC AUTO: 98.5 FL (ref 80–100)
MONOCYTES # BLD AUTO: 0.8 K/UL (ref 0–1)
MONOCYTES NFR BLD AUTO: 8 %
NEUTROPHILS # BLD AUTO: 5.7 K/UL (ref 1.3–7.7)
NEUTROPHILS NFR BLD AUTO: 58 %
PLATELET # BLD AUTO: 223 K/UL (ref 150–450)
POTASSIUM SERPL-SCNC: 5 MMOL/L (ref 3.5–5.1)
SODIUM SERPL-SCNC: 138 MMOL/L (ref 137–145)
WBC # BLD AUTO: 9.7 K/UL (ref 3.8–10.6)

## 2020-05-20 PROCEDURE — 71045 X-RAY EXAM CHEST 1 VIEW: CPT

## 2020-05-20 PROCEDURE — 80061 LIPID PANEL: CPT

## 2020-05-20 PROCEDURE — 36415 COLL VENOUS BLD VENIPUNCTURE: CPT

## 2020-05-20 PROCEDURE — 83036 HEMOGLOBIN GLYCOSYLATED A1C: CPT

## 2020-05-20 PROCEDURE — 84443 ASSAY THYROID STIM HORMONE: CPT

## 2020-05-20 PROCEDURE — 85025 COMPLETE CBC W/AUTO DIFF WBC: CPT

## 2020-05-20 PROCEDURE — 80320 DRUG SCREEN QUANTALCOHOLS: CPT

## 2020-05-20 PROCEDURE — 82553 CREATINE MB FRACTION: CPT

## 2020-05-20 PROCEDURE — 80048 BASIC METABOLIC PNL TOTAL CA: CPT

## 2020-05-20 PROCEDURE — 84439 ASSAY OF FREE THYROXINE: CPT

## 2020-05-20 PROCEDURE — 93005 ELECTROCARDIOGRAM TRACING: CPT

## 2020-05-20 PROCEDURE — 70470 CT HEAD/BRAIN W/O & W/DYE: CPT

## 2020-05-20 PROCEDURE — 99285 EMERGENCY DEPT VISIT HI MDM: CPT

## 2020-05-20 PROCEDURE — 80053 COMPREHEN METABOLIC PANEL: CPT

## 2020-05-20 PROCEDURE — 82075 ASSAY OF BREATH ETHANOL: CPT

## 2020-05-20 SDOH — SOCIAL STABILITY - SOCIAL INSECURITY: PROBLEMS RELATED TO LIVING ALONE: Z60.2

## 2020-05-20 NOTE — ED
General Adult HPI





- General


Source: patient


Mode of arrival: ambulatory


Limitations: no limitations





<IdaLesliekirill NEIL - Last Filed: 05/20/20 15:06>





<Piotr oMtta - Last Filed: 05/20/20 16:11>





- General


Chief complaint: Psychiatric Symptoms


Stated complaint: Mental Health


Time Seen by Provider: 05/20/20 11:59





- History of Present Illness


Initial comments: 


Dictation was produced using dragon dictation software. please excuse any 

grammatical, word or spelling errors. 





This patient was cared for during a federal and state declared state of 

emergency secondary to Covid 19





Chief Complaint: 64-year-old female sent in by primary care physician for 

psychotic behavior.





History of Present Illness: Patient is a 64-year-old female she was told to come

to the emergency department by her primary care physician.  I received a call 

from Dr. Jacobs who saw patient in the office earlier today.  He reports that 

patient was showing very bizarre personality symptoms.  She allegedly does have 

psychiatric history.  She does follow up with [facility].  When 

asked why she was brought to the emergency department she reports that "the Lord

brought her here."  She denies any medical problems however she does report 

feeling opressed.  She is not able to explain and better detail above who or 

what is depressing her.  She denies any suicidal or homicidal ideation.  Denies 

any visual or auditory hallucinations.  She does like people are out to get her


  


The ROS documented in this emergency department record has been reviewed and 

confirmed by me.  Those systems with pertinent positive or negative responses 

have been documented in the HPI.  All other systems are other negative and/or no

ncontributory.








PHYSICAL EXAM:


General Impression: Alert and oriented x3, not in acute distress


HEENT: Normocephalic atraumatic, extra-ocular movements intact, pupils equal and

reactive to light bilaterally, mucous membranes moist.


Cardiovascular: Heart regular rate and rhythm


Chest: Able to complete full sentences, no retractions, no tachypnea


Abdomen: abdomen soft, non-tender, non-distended, no organomegaly


Musculoskeletal: Pulses present and equal in all extremities, no peripheral 

edema


Motor:  no focal deficits noted


Neurological: CN II-XII grossly intact, no focal motor or sensory deficits noted


Skin: Intact with no visualized rashes


Psych: Paranoid, tangential speech





ED course: 64-year-old female presents with clinical presentation consistent 

with acute psychosis.  vital signs upon arrival are within acceptable limits.





EKG interpretation: Ventricular rate 75, normal sinus rhythm,.  Interval 154, 

, . No IA prolongation, no QTC prolongation, no ST or T-wave 

changes noted.    Overall, this EKG is unremarkable


 (Eulalio Prieto)





- Related Data


                                Home Medications











 Medication  Instructions  Recorded  Confirmed


 


Levothyroxine Sodium [Synthroid] 50 mcg PO DAILY 06/15/16 05/20/20


 


amLODIPine BESYLATE [Amlodipine 10 mg PO DAILY 06/15/16 05/20/20





Besylate]   


 


Atenolol [Tenormin] 50 mg PO DAILY 10/25/19 05/20/20


 


Atorvastatin Calcium [Lipitor] 10 mg PO DAILY 10/25/19 05/20/20


 


Benztropine Mesylate [Cogentin] 0.5 mg PO TID PRN 05/20/20 05/20/20


 


L.acidoph,Paracasei, B.lactis 1 cap PO DAILY 05/20/20 05/20/20





[Probiotic]   


 


Paliperidone IM [Invega Sustenna] 156 mg IM Q28D 05/20/20 05/20/20











                                    Allergies











Allergy/AdvReac Type Severity Reaction Status Date / Time


 


ibuprofen [From Motrin] Allergy  Swelling Verified 05/20/20 14:01


 


NSAIDS (Non-Steroidal Allergy  Rash/Hives Verified 05/20/20 14:01





Anti-Inflamma     














Review of Systems


ROS Other: All systems not noted in ROS Statement are negative.





<Eulalio Prieto - Last Filed: 05/20/20 15:06>


ROS Other: All systems not noted in ROS Statement are negative.





<Piotr Motta - Last Filed: 05/20/20 16:11>


ROS Statement: 


Those systems with pertinent positive or pertinent negative responses have been 

documented in the HPI.








Past Medical History


Past Medical History: Hypertension, Thyroid Disorder


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


History of Any Multi-Drug Resistant Organisms: None Reported


Past Surgical History: Tubal Ligation


Past Anesthesia/Blood Transfusion Reactions: No Reported Reaction


Past Psychological History: Anxiety, Bipolar, Depression, Schizophrenia


Smoking Status: Current every day smoker


Past Alcohol Use History: None Reported


Past Drug Use History: None Reported





- Past Family History


  ** Father


Family Medical History: Unable to Obtain


Additional Family Medical History / Comment(s): Father is alive at age 86 with 

history of diabetes.





  ** Mother


Additional Family Medical History / Comment(s): Mother is alive at age 85 with 

history of hypertension, temporal arteritis, peripheral vascular disease status 

post carotid surgery, blindness in the left eye.





  ** Sister(s)


Additional Family Medical History / Comment(s): Patient has 4 sisters with no 

major medical problems.  Patient has 3 daughters with no major medical problems.





<Eulalio Prieto - Last Filed: 05/20/20 15:06>





General Exam


Limitations: no limitations





<Eulalio Prieto - Last Filed: 05/20/20 15:06>





Course





                                   Vital Signs











  05/20/20





  12:05


 


Temperature 98.0 F


 


Pulse Rate 79


 


Respiratory 18





Rate 


 


Blood Pressure 94/60


 


O2 Sat by Pulse 97





Oximetry 














Medical Decision Making





- Lab Data


Result diagrams: 


                                 05/20/20 12:30





                                 05/20/20 12:30





<Eulalio Prieto - Last Filed: 05/20/20 15:06>





- Lab Data


Result diagrams: 


                                 05/20/20 12:30





                                 05/20/20 12:30





<Piotr Motta - Last Filed: 05/20/20 16:11>





- Medical Decision Making


Laboratory evaluation obtained.  CBC, metabolic panel, alcohol is negative.  

Patient medically for EPS evaluation.  The patient is signed out to Dr. Motta for

follow-up of EPS recommendation.


 (Eulalio Prieto)





- Lab Data





                                   Lab Results











  05/20/20 05/20/20 Range/Units





  12:30 12:30 


 


WBC  9.7   (3.8-10.6)  k/uL


 


RBC  4.34   (3.80-5.40)  m/uL


 


Hgb  14.6   (11.4-16.0)  gm/dL


 


Hct  42.8   (34.0-46.0)  %


 


MCV  98.5   (80.0-100.0)  fL


 


MCH  33.6   (25.0-35.0)  pg


 


MCHC  34.1   (31.0-37.0)  g/dL


 


RDW  12.8   (11.5-15.5)  %


 


Plt Count  223   (150-450)  k/uL


 


Neutrophils %  58   %


 


Lymphocytes %  30   %


 


Monocytes %  8   %


 


Eosinophils %  1   %


 


Basophils %  0   %


 


Neutrophils #  5.7   (1.3-7.7)  k/uL


 


Lymphocytes #  2.9   (1.0-4.8)  k/uL


 


Monocytes #  0.8   (0-1.0)  k/uL


 


Eosinophils #  0.1   (0-0.7)  k/uL


 


Basophils #  0.0   (0-0.2)  k/uL


 


Sodium   138  (137-145)  mmol/L


 


Potassium   5.0  (3.5-5.1)  mmol/L


 


Chloride   109 H  ()  mmol/L


 


Carbon Dioxide   18 L  (22-30)  mmol/L


 


Anion Gap   11  mmol/L


 


BUN   14  (7-17)  mg/dL


 


Creatinine   0.62  (0.52-1.04)  mg/dL


 


Est GFR (CKD-EPI)AfAm   >90  (>60 ml/min/1.73 sqM)  


 


Est GFR (CKD-EPI)NonAf   >90  (>60 ml/min/1.73 sqM)  


 


Glucose   98  (74-99)  mg/dL


 


Calcium   9.6  (8.4-10.2)  mg/dL


 


Serum Alcohol   <10  mg/dL














Disposition





<Eulalio Prieto - Last Filed: 05/20/20 15:06>


Time of Disposition: 16:11





<Piotr Motta - Last Filed: 05/20/20 16:11>


Clinical Impression: 


 Acute psychosis





Disposition: ADMITTED AS IP TO THIS HOSP


Referrals: 


Brendon Jacobs Jr, DO [Primary Care Provider] - 1-2 days

## 2020-05-21 LAB
CHOLEST SERPL-MCNC: 151 MG/DL (ref ?–200)
HBA1C MFR BLD: 5.3 % (ref 4–6)
HDLC SERPL-MCNC: 48 MG/DL (ref 40–60)
LDLC SERPL CALC-MCNC: 83 MG/DL (ref 0–99)
TRIGL SERPL-MCNC: 99 MG/DL (ref ?–150)

## 2020-05-21 RX ADMIN — PALIPERIDONE SCH MG: 3 TABLET, EXTENDED RELEASE ORAL at 12:30

## 2020-05-21 RX ADMIN — LEVOTHYROXINE SODIUM SCH MCG: 50 TABLET ORAL at 05:29

## 2020-05-21 RX ADMIN — ATORVASTATIN CALCIUM SCH MG: 10 TABLET, FILM COATED ORAL at 09:12

## 2020-05-21 RX ADMIN — NICOTINE POLACRILEX PRN MG: 2 GUM, CHEWING BUCCAL at 16:44

## 2020-05-21 RX ADMIN — ATENOLOL SCH MG: 50 TABLET ORAL at 09:12

## 2020-05-21 RX ADMIN — Medication SCH MG: at 21:16

## 2020-05-21 RX ADMIN — BENZTROPINE MESYLATE SCH MG: 0.5 TABLET ORAL at 21:16

## 2020-05-21 RX ADMIN — BENZTROPINE MESYLATE SCH MG: 0.5 TABLET ORAL at 12:30

## 2020-05-21 RX ADMIN — NICOTINE POLACRILEX PRN MG: 2 GUM, CHEWING BUCCAL at 12:31

## 2020-05-21 NOTE — P.HPIM
History of Present Illness


H&P Date: 05/21/20


Chief Complaint: Increased  psychotic behavior secondary to stopping behavioral 

meds


This patient presented to my office, during the Federal state declared state of 

emergency secondary to covid-19, Ms. Zimmer is a 64-year-old female well-known 

to my practice has been in and out of the mental health unit and been on and off

multiple meds for bipolar disorder potentially for schizoaffective disorder, and

ultimately hasn't been following through.  She has recently been on the Lamictal

to 25 mg pills at night for mood swings would be my assumption, and she had been

getting an injection of sustained release antipsychotic meds once a month which 

she stopped showing up for at [facility].  While I interviewed her 

and examined her in the office she would begin crying and discussing things that

sends her from her daughters and grandchildren and 2 laughing and crying and 

asking me about my personal life and my life medications her daughter and my 

wife's sister used to hang around in high school and every subject in between 

jabbering from topic to topic laughing and crying laughing hysterically patient 

did haven't to me that at night she was taking exits extensive amounts of her 

Lamictal she take one that she take to and that she take 3 then she take before 

that she take a handful I think she wasn't clear on which she was taking this 

medication for.  She said sometimes and lately much of the time she had the 

sense of hopelessness and what I help her and she couldn't leave the house.  I 

point-blank ask her as she was having suicidal thoughts she wouldn't come out 

and specifically say that however she did say that she had nothing to live for. 

She would commit to she would not commit to how long she had stopped taking her 

medicine she said she didn't couldn't remember however she denies overt 

homicidal and overt suicidal ideation, this patient also demonstrates certain 

amount of paranoia, he thinks people are out to get her , after approximately 30

minutes of difficult conversation from her I suggested that she come to the 

hospital given admitted to the mental health unit so we can reestablish her 

medications so the she can reasonably function outside facility and she seemed 

very excited about that and she told me in no uncertain terms that she felt much

safer in the hospital.





Review of Systems


All systems: negative


Ears, nose, mouth and throat: Reports as per HPI


Cardiovascular: Reports as per HPI


Respiratory: Reports as per HPI


Gastrointestinal: Reports as per HPI


Genitourinary: Reports as per HPI


Menstruation: Reports as per HPI


Musculoskeletal: Reports as per HPI


Integumentary: Reports as per HPI


Neurological: Reports as per HPI


Psychiatric: Reports anxiety attacks, Reports confusion, Reports depression, 

Reports difficulty concentrating, Reports disorientation, Reports hopelessness, 

Reports insomnia, Reports mood swings, Reports sadness/tearfulness, Reports 

sleep disturbances


Endocrine: Reports as per HPI





Past Medical History


Past Medical History: Hypertension, Memory Impairment, Thyroid Disorder


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


History of Any Multi-Drug Resistant Organisms: None Reported


Past Surgical History: Tubal Ligation


Past Anesthesia/Blood Transfusion Reactions: No Reported Reaction


Past Psychological History: Anxiety (Patient had recent admission to the mental 

health unit she stated she believed last October), Bipolar, Depression, 

Schizophrenia


Smoking Status: Current every day smoker


Past Alcohol Use History: None Reported


Past Drug Use History: None Reported





- Past Family History


  ** Father


Family Medical History: Unable to Obtain


Additional Family Medical History / Comment(s): Father is alive at age 86 with 

history of diabetes.





  ** Mother


Additional Family Medical History / Comment(s): Mother is alive at age 85 with 

history of hypertension, temporal arteritis, peripheral vascular disease status 

post carotid surgery, blindness in the left eye.





  ** Sister(s)


Additional Family Medical History / Comment(s): Patient has 4 sisters with no 

major medical problems.  Patient has 3 daughters with no major medical problems.





Medications and Allergies


                                Home Medications











 Medication  Instructions  Recorded  Confirmed  Type


 


Levothyroxine Sodium [Synthroid] 50 mcg PO DAILY 06/15/16 05/20/20 History


 


amLODIPine BESYLATE [Amlodipine 10 mg PO DAILY 06/15/16 05/20/20 History





Besylate]    


 


Atenolol [Tenormin] 50 mg PO DAILY 10/25/19 05/20/20 History


 


Atorvastatin Calcium [Lipitor] 10 mg PO DAILY 10/25/19 05/20/20 History


 


Benztropine Mesylate [Cogentin] 0.5 mg PO TID PRN 05/20/20 05/20/20 History


 


L.acidoph,Paracasei, B.lactis 1 cap PO DAILY 05/20/20 05/20/20 History





[Probiotic]    


 


Paliperidone IM [Invega Sustenna] 156 mg IM Q28D 05/20/20 05/20/20 History








                                    Allergies











Allergy/AdvReac Type Severity Reaction Status Date / Time


 


ibuprofen [From Motrin] Allergy  Swelling Verified 05/20/20 14:01


 


NSAIDS (Non-Steroidal Allergy  Rash/Hives Verified 05/20/20 14:01





Anti-Inflamma     














Physical Exam


Osteopathic Statement: *.  No significant issues noted on an osteopathic 

structural exam other than those noted in the History and Physical/Consult.


Vitals: 


                                   Vital Signs











  Temp Pulse Pulse Resp BP BP Pulse Ox


 


 05/21/20 09:15    117 H    124/66 


 


 05/21/20 07:37  97.9 F   95  18   152/70  93 L


 


 05/20/20 23:00  98.7 F      


 


 05/20/20 18:02  97.2 F L      


 


 05/20/20 17:36  98.9 F   80  20   130/74 


 


 05/20/20 17:09   65   18  100/65   98








                                Intake and Output











 05/20/20 05/21/20 05/21/20





 22:59 06:59 14:59


 


Other:   


 


  Weight 104.1 kg  














General: Patient's awake alert oriented 3 vital signs stable currently afebrile


Patient recently appeared quite manic, and emotions were all over the place


HEENT: [PERRL.  EOMI. No pharyngeal erythema or exudate.]


Neck: [No adenopathy.]


Cardiac: [Heart regular in rate and rhythm.  No S3. No S4. No clicks, rubs. No 

murmur.]


Lungs: [Clear to auscultation bilaterally.]


Abdomen: [No mass.  No organomegaly.  Bowel sounds presnt and normoactive in all

 4 quadrants.


Significant morbid obesity


Extremes: [No edema no cyanosis no claudication normal pulses]


: []


Musculoskeletal: [No joint erythema, edema or tenderness.]


Skin: [No rash.]


Neurologic: [No lateralizing deficits.  CN II - XII grossly intact.]


Lymphatic: [No adenopathy.]





Results


CBC & Chem 7: 


                                 05/20/20 12:30





                                 05/20/20 12:30


Labs: 


                  Abnormal Lab Results - Last 24 Hours (Table)











  05/20/20 Range/Units





  12:30 


 


TSH  0.376 L  (0.465-4.680)  mIU/L














Thrombosis Risk Factor Assmnt





- DVT/VTE Prophylaxis


DVT/VTE Prophylaxis: Low risk, early ambulation encouraged





Assessment and Plan


(1) Noncompliance w/medication treatment due to intermit use of medication


Current Visit: Yes   Status: Acute   Code(s): Z91.14 - PATIENT'S OTHER 

NONCOMPLIANCE WITH MEDICATION REGIMEN   SNOMED Code(s): 454044598


   





(2) Acute psychosis


Current Visit: Yes   Status: Acute   Code(s): F23 - BRIEF PSYCHOTIC DISORDER   

SNOMED Code(s): 93566983


   





(3) Acute anxiety


Current Visit: No   Status: Acute   Code(s): F41.9 - ANXIETY DISORDER, 

UNSPECIFIED   SNOMED Code(s): 50674702


   





(4) Depression


Current Visit: No   Status: Acute   Code(s): F32.9 - MAJOR DEPRESSIVE DISORDER, 

SINGLE EPISODE, UNSPECIFIED   SNOMED Code(s): 86275761


   


Plan: 





In no uncertain terms patient admitted to noncompliance with behavioral 

medications recent use date unknown


Admission of intermittent depression anxiety with bouts of paranoia


Patient states she is taking all other meds including her antihypertensive is as

well as her statin


Her blood pressure is well controlled it appears that she is been honest about 

that


It was felt that this patient needed to be sent to the mental health unit at 

[facility] for adequate medication stabilization and observation


Time with Patient: Greater than 30

## 2020-05-21 NOTE — P.HP
Psychiatric H&P





- .


H&P Date: 05/21/20


History & Physical: 


                                    Allergies











Allergy/AdvReac Type Severity Reaction Status Date / Time


 


ibuprofen From Motrin Allergy  Swelling Verified 05/20/20 14:01


 


NSAIDS (Non-Steroidal Allergy  Rash/Hives Verified 05/20/20 14:01





Anti-Inflamma     








                                   Vital Signs











Temp  97.9 F   05/21/20 07:37


 


Pulse  117 H  05/21/20 09:15


 


Resp  18   05/21/20 07:37


 


BP  124/66   05/21/20 09:15


 


Pulse Ox  93 L  05/21/20 07:37








                                 Intake & Output











 05/20/20 05/21/20 05/21/20





 18:59 06:59 18:59


 


Weight 104.1 kg  








                             Laboratory Last Values











WBC  9.7 k/uL (3.8-10.6)   05/20/20  12:30    


 


RBC  4.34 m/uL (3.80-5.40)   05/20/20  12:30    


 


Hgb  14.6 gm/dL (11.4-16.0)   05/20/20  12:30    


 


Hct  42.8 % (34.0-46.0)   05/20/20  12:30    


 


MCV  98.5 fL (80.0-100.0)   05/20/20  12:30    


 


MCH  33.6 pg (25.0-35.0)   05/20/20  12:30    


 


MCHC  34.1 g/dL (31.0-37.0)   05/20/20  12:30    


 


RDW  12.8 % (11.5-15.5)   05/20/20  12:30    


 


Plt Count  223 k/uL (150-450)   05/20/20  12:30    


 


Neutrophils %  58 %  05/20/20  12:30    


 


Lymphocytes %  30 %  05/20/20  12:30    


 


Monocytes %  8 %  05/20/20  12:30    


 


Eosinophils %  1 %  05/20/20  12:30    


 


Basophils %  0 %  05/20/20  12:30    


 


Neutrophils #  5.7 k/uL (1.3-7.7)   05/20/20  12:30    


 


Lymphocytes #  2.9 k/uL (1.0-4.8)   05/20/20  12:30    


 


Monocytes #  0.8 k/uL (0-1.0)   05/20/20  12:30    


 


Eosinophils #  0.1 k/uL (0-0.7)   05/20/20  12:30    


 


Basophils #  0.0 k/uL (0-0.2)   05/20/20  12:30    


 


Sodium  138 mmol/L (137-145)   05/20/20  12:30    


 


Potassium  5.0 mmol/L (3.5-5.1)   05/20/20  12:30    


 


Chloride  109 mmol/L ()  H  05/20/20  12:30    


 


Carbon Dioxide  18 mmol/L (22-30)  L  05/20/20  12:30    


 


Anion Gap  11 mmol/L  05/20/20  12:30    


 


BUN  14 mg/dL (7-17)   05/20/20  12:30    


 


Creatinine  0.62 mg/dL (0.52-1.04)   05/20/20  12:30    


 


Est GFR (CKD-EPI)AfAm  >90  (>60 ml/min/1.73 sqM)   05/20/20  12:30    


 


Est GFR (CKD-EPI)NonAf  >90  (>60 ml/min/1.73 sqM)   05/20/20  12:30    


 


Glucose  98 mg/dL (74-99)   05/20/20  12:30    


 


Calcium  9.6 mg/dL (8.4-10.2)   05/20/20  12:30    


 


Triglycerides  99 mg/dL (<150)   05/20/20  12:30    


 


Cholesterol  151 mg/dL (<200)   05/20/20  12:30    


 


LDL Cholesterol, Calc  83 mg/dL (0-99)   05/20/20  12:30    


 


HDL Cholesterol  48 mg/dL (40-60)   05/20/20  12:30    


 


TSH  0.376 mIU/L (0.465-4.680)  L  05/20/20  12:30    


 


Serum Alcohol  <10 mg/dL  05/20/20  12:30    











05/21/20 10:43


IDENTIFYING DATA: Patient is a 64-year-old  female with a chronic 

history of schizophrenia, is  and lives alone in an apartment and has 3 

daughters.





HPI: Patient presented to the hospital at the direction of her time in her care 

physician for "bizarre symptoms".  As per ER report states that patient claims 

that the "Lord brought her here" when asked why she came to the hospital.  

Patient also at that time endorsed depression and paranoia.  Patient was last 

discharged from the mental health unit in 10/2019 and was placed on Invega 

Sustenna 117 mg and had been titrated up to a dose of 156 mg monthly.  Patient 

claims that she was "taking too much of my Lamictal and felt that I was under 

the spell of witchcraft".  She claims that she has been feeling like this for 

several weeks.  She did endorse some paranoia as well as home.  She claims that 

she felt certain people were "putting me on a curse".  She claims that she does 

not feel that way at this time.  She states that her last injection of Invega 

Sustenna was on April 28 and claims that she gets it monthly.  She states that 

her "legs twitch" when she is on the medication and she is walking.  At this 

time she is not endorsing paranoia and claims that her mood is "good" and denies

any depression.  She claims that she sleeps in "catnaps" during the day and at 

night.  She states her appetite is fair. Patient denies any suicidal or 

homicidal ideations intent or plan.  At this time patient denies any auditory or

visual hallucinations.  Patient admits to using daily





PAST PSYCHIATRIC HISTORY: Patient states that she has a history of schizophrenia

and was following up with [facility].  Patient was previously on Lamictal 50 mg daily 

at bedtime, Invega Sustenna 156 mg every monthly.  Her last psychiatric 

admission was in 10/2019.  Denies any suicide attempts in the past.





PMH: Hypertension, thyroid disorder, blind in her left eye





ALLERGIES: as per EMR





CHEMICAL DEPENDENCY HISTORY: as per HPI





FAMILY PSYCHIATRIC/SUBSTANCE USE HISTORY: States that her aunt, uncle, cousin 

and sister all have schizophrenia.





SOCIAL HISTORY: Patient was born and raised in [place] [facility] and now 

currently lives in [place].  She lives in a apartment alone and has 3 

daughters.  She claims that she completed up to the 12th grade of school.  She 

denies any legal history.





MENTAL STATUS EXAM: 


General Appearance: Patient appears to be overweight, stated age is alert, 

directable, and attempts to cooperate. Patient appears to have poor hygiene and 

grooming.


Behavior: Patient is seated without any agitated behavior.  Attempts to 

cooperate.


Speech: Patient's speech is fluent and nonpressured.


Mood/Affect: Patient reports their mood is "okay", affect is congruent


Suicidality/Homicidality:  Patient denies having any homicidal ideation intent 

or plan. Denies any suicidal ideations intent or plan  


Perceptions: Patient denies any visual hallucinations and denies any auditory 

hallucinations


Though content/process: Patient endorses delusions of witchcraft and being in a 

curse.  Goal oriented, logical.


Memory and concentration: AOX3, grossly intact for the purposes of this session.

Can spell "WORLD" backwards


Judgment and insight: Limited





STRENGTHS/WEAKNESSES: strength is that patient is resilient. Weakness is that 

patient has poor judgment and has chronic mental illness





INTELLECT: average





IMPRESSIONS: 


Schizophrenia


Nicotine dependence





PLAN: 


-Patient is admitted to the MHU for stabilization of psychiatric symptoms and 

safety.  Patient is currently on a active court order.  Patient signed for 

medications consent and was placed in chart.


-Medications : Will start patient on Invega by mouth 3 mg daily for psychosis.  

Patient is agreeable to receive Invega Sustenna dose prior to discharge.  Will 

start melatonin 6 mg daily at bedtime for sleep.  Switched Cogentin to 0.5 mg 

twice a day for EPS prophylaxis.


-Will attempt to confirm last time patient received her Invega Sustenna 

injection and at what dose from her outpatient psychiatrist.


-Ativan and Geodon PRN for agitation/aggression


-Patient was informed of the risks, benefits and side effects of the medication 

and patient verbally consented to taking the medications. Patient signed med 

consent form and was placed in chart.


-Internal Medicine consult to perform medical evaluation and physical.


-NRT -nicotine gum.


-SW on board for discharge planning. Encourage patient to participate in groups 

to work on coping skills. 


05/21/20 11:50





05/21/20 11:57

## 2020-05-22 RX ADMIN — PALIPERIDONE SCH MG: 3 TABLET, EXTENDED RELEASE ORAL at 10:20

## 2020-05-22 RX ADMIN — ATENOLOL SCH MG: 50 TABLET ORAL at 10:19

## 2020-05-22 RX ADMIN — BENZTROPINE MESYLATE SCH MG: 0.5 TABLET ORAL at 20:37

## 2020-05-22 RX ADMIN — NICOTINE SCH PATCH: 14 PATCH, EXTENDED RELEASE TRANSDERMAL at 10:51

## 2020-05-22 RX ADMIN — BENZTROPINE MESYLATE SCH MG: 0.5 TABLET ORAL at 10:20

## 2020-05-22 RX ADMIN — ATORVASTATIN CALCIUM SCH MG: 10 TABLET, FILM COATED ORAL at 10:19

## 2020-05-22 RX ADMIN — NICOTINE POLACRILEX PRN MG: 2 GUM, CHEWING BUCCAL at 10:21

## 2020-05-22 RX ADMIN — LEVOTHYROXINE SODIUM SCH MCG: 50 TABLET ORAL at 06:19

## 2020-05-22 RX ADMIN — Medication SCH MG: at 20:37

## 2020-05-22 NOTE — P.PN
Progress Note - Text


Progress Note Date: 05/22/20





Interval History:


Patient was seen coming out of group this morning and was directable and agree

able to speak with writer in the office.  She appears to be brighter in her 

affect today and was cooperative with the interview.  She asked several 

questions to writer about how he was doing.  She claims that she slept well last

night and throughout the night with no overnight complaints.  She states that 

she has been taking her medications and denies any problems with them at this 

time.  She states that she has been getting along with others on the unit.  She 

continues to endorse "witchcraft" going on however states that "I'm going to 

wait for the investigation to be over and then I'll decide".  She claims to have

a fair appetite. At this time patient denies any suicidal or homical ideations, 

intent or plan. Patient denies any auditory, visual hallucinations. Patient 

denies any side effects from the medications and has been compliant with meds. 





Mental Status Exam:


General Appearance: Patient appears to be overweight, stated age is alert, 

directable, and attempts to cooperate. Patient appears to have improving hygiene

and grooming.


Behavior: Patient is seated without any agitated behavior.  Attempts to 

cooperate.  Polite.


Speech: Patient's speech is fluent and nonpressured.


Mood/Affect: Patient reports their mood is "good", affect is congruent and 

euthymic.


Suicidality/Homicidality:  Patient denies having any homicidal ideation intent 

or plan. Denies any suicidal ideations intent or plan  


Perceptions: Patient denies any visual hallucinations and denies any auditory 

hallucinations


Though content/process: Patient endorses delusions of witchcraft.  Goal 

oriented, logical.


Memory and concentration: AOX3, grossly intact for the purposes of this session


Judgment and insight: Limited, mildly improving.





Assessment


Schizophrenia


Nicotine dependence





Plan:


-Patient continues to meet criteria for inpatient psychiatric admission for 

symptom stabilization and safety. Patient is currently on a active court order. 

Patient signed for medications consent and was placed in chart.


-Medications: Increased paliperidone by mouth to 6 mg daily for psychosis to be 

continued throughout the weekend.  Patient will be transitioned onto Invega 

Sustenna prior to discharge.  We'll await confirmation of patient's monthly dose

and if this date to be increased.  Continue melatonin 6 mg daily at bedtime for 

sleep.  Continue with Cogentin 0.5 mg twice a day for EPS prophylaxis.


-When necessary Ativan and Geodon for agitation/aggression.


-NRT - nicotine patch and gum.


-SW on board for discharge planning.  Encouraged the patient to participate in 

milieu.

## 2020-05-23 RX ADMIN — ATENOLOL SCH MG: 50 TABLET ORAL at 09:03

## 2020-05-23 RX ADMIN — LEVOTHYROXINE SODIUM SCH MCG: 50 TABLET ORAL at 05:58

## 2020-05-23 RX ADMIN — NICOTINE SCH PATCH: 14 PATCH, EXTENDED RELEASE TRANSDERMAL at 09:07

## 2020-05-23 RX ADMIN — BENZTROPINE MESYLATE SCH MG: 0.5 TABLET ORAL at 20:56

## 2020-05-23 RX ADMIN — BENZTROPINE MESYLATE SCH MG: 0.5 TABLET ORAL at 09:07

## 2020-05-23 RX ADMIN — PALIPERIDONE SCH MG: 6 TABLET, EXTENDED RELEASE ORAL at 09:07

## 2020-05-23 RX ADMIN — ATORVASTATIN CALCIUM SCH MG: 10 TABLET, FILM COATED ORAL at 09:06

## 2020-05-23 RX ADMIN — Medication SCH MG: at 20:56
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
## 2020-05-24 RX ADMIN — BENZTROPINE MESYLATE SCH MG: 0.5 TABLET ORAL at 22:07

## 2020-05-24 RX ADMIN — NICOTINE SCH PATCH: 14 PATCH, EXTENDED RELEASE TRANSDERMAL at 09:20

## 2020-05-24 RX ADMIN — PALIPERIDONE SCH MG: 6 TABLET, EXTENDED RELEASE ORAL at 09:21

## 2020-05-24 RX ADMIN — NICOTINE SCH: 14 PATCH, EXTENDED RELEASE TRANSDERMAL at 09:22

## 2020-05-24 RX ADMIN — ATORVASTATIN CALCIUM SCH MG: 10 TABLET, FILM COATED ORAL at 09:20

## 2020-05-24 RX ADMIN — BENZTROPINE MESYLATE SCH MG: 0.5 TABLET ORAL at 09:21

## 2020-05-24 RX ADMIN — LEVOTHYROXINE SODIUM SCH MCG: 50 TABLET ORAL at 05:52

## 2020-05-24 RX ADMIN — Medication SCH MG: at 22:07

## 2020-05-24 RX ADMIN — ATENOLOL SCH MG: 50 TABLET ORAL at 09:20

## 2020-05-24 NOTE — P.PN
Progress Note - Text


Progress Note Date: 05/24/20


Interim history: I reviewed the medical record and interviewed the patient.  She

was distressed and watch the interview.  She cried when talking about her 

thoughts and feelings.  She expressed overvalued [employer] thoughts. One source 

of distress is her perception that other people do not value "the Lord" as 

highly as she does.  She has a special relationship with the Lord was alert 

speaks to her.





Mental status exam: She presented as a casually groomed moderately obese 

64-year-old  female who was pleasant on approach.  She made eye contact

and attended the interview.  She had no prominent physical abnormalities.  She 

had a distressed facial expression.  She was alert and oriented to person, place

and time.  She showed no abnormality of psychomotor activity and speech was 

spontaneous with normal rate and volume.  Her affect was labile and depressed.  

She did not express suicidal ideation, death wishes homicidal ideation.  She 

denied feeling hopeless, helpless or worthless.  She did not express clear ideas

reference, paranoid ideation or delusions.  Her thinking was concrete and 

associations were not fully coherent, logical goal-directed.  She did not appear

to be responding to internal stimuli.





Assessment: She appears more depressed than hypomanic today





Plan: Continue inpatient treatment.  Continue safety precautions.  He denied 

current medications including Invega 6 mg daily.   to coordinate 

discharge and aftercare services.  Encouraged continued participation in 

therapeutic groups and activities.  Evaluate clinical status response to 

treatment daily basis.

## 2020-05-25 LAB
ALBUMIN SERPL-MCNC: 4.1 G/DL (ref 3.5–5)
ALP SERPL-CCNC: 69 U/L (ref 38–126)
ALT SERPL-CCNC: 34 U/L (ref 4–34)
ANION GAP SERPL CALC-SCNC: 6 MMOL/L
AST SERPL-CCNC: 35 U/L (ref 14–36)
BASOPHILS # BLD AUTO: 0 K/UL (ref 0–0.2)
BASOPHILS NFR BLD AUTO: 0 %
BUN SERPL-SCNC: 12 MG/DL (ref 7–17)
CALCIUM SPEC-MCNC: 9.5 MG/DL (ref 8.4–10.2)
CHLORIDE SERPL-SCNC: 106 MMOL/L (ref 98–107)
CO2 SERPL-SCNC: 26 MMOL/L (ref 22–30)
EOSINOPHIL # BLD AUTO: 0.1 K/UL (ref 0–0.7)
EOSINOPHIL NFR BLD AUTO: 1 %
ERYTHROCYTE [DISTWIDTH] IN BLOOD BY AUTOMATED COUNT: 4.26 M/UL (ref 3.8–5.4)
ERYTHROCYTE [DISTWIDTH] IN BLOOD: 12.1 % (ref 11.5–15.5)
GLUCOSE SERPL-MCNC: 113 MG/DL (ref 74–99)
HCT VFR BLD AUTO: 41.7 % (ref 34–46)
HGB BLD-MCNC: 13.6 GM/DL (ref 11.4–16)
LYMPHOCYTES # SPEC AUTO: 1.7 K/UL (ref 1–4.8)
LYMPHOCYTES NFR SPEC AUTO: 28 %
MCH RBC QN AUTO: 31.9 PG (ref 25–35)
MCHC RBC AUTO-ENTMCNC: 32.7 G/DL (ref 31–37)
MCV RBC AUTO: 97.7 FL (ref 80–100)
MONOCYTES # BLD AUTO: 0.4 K/UL (ref 0–1)
MONOCYTES NFR BLD AUTO: 7 %
NEUTROPHILS # BLD AUTO: 3.9 K/UL (ref 1.3–7.7)
NEUTROPHILS NFR BLD AUTO: 63 %
PLATELET # BLD AUTO: 169 K/UL (ref 150–450)
POTASSIUM SERPL-SCNC: 4.1 MMOL/L (ref 3.5–5.1)
PROT SERPL-MCNC: 7.2 G/DL (ref 6.3–8.2)
SODIUM SERPL-SCNC: 138 MMOL/L (ref 137–145)
WBC # BLD AUTO: 6.1 K/UL (ref 3.8–10.6)

## 2020-05-25 RX ADMIN — BENZTROPINE MESYLATE SCH MG: 0.5 TABLET ORAL at 08:36

## 2020-05-25 RX ADMIN — ATENOLOL SCH MG: 50 TABLET ORAL at 08:36

## 2020-05-25 RX ADMIN — NICOTINE POLACRILEX PRN MG: 2 GUM, CHEWING BUCCAL at 09:29

## 2020-05-25 RX ADMIN — BENZTROPINE MESYLATE SCH MG: 0.5 TABLET ORAL at 19:58

## 2020-05-25 RX ADMIN — PALIPERIDONE SCH MG: 6 TABLET, EXTENDED RELEASE ORAL at 08:36

## 2020-05-25 RX ADMIN — NICOTINE SCH PATCH: 14 PATCH, EXTENDED RELEASE TRANSDERMAL at 08:36

## 2020-05-25 RX ADMIN — LEVOTHYROXINE SODIUM SCH MCG: 50 TABLET ORAL at 05:16

## 2020-05-25 RX ADMIN — Medication SCH MG: at 19:58

## 2020-05-25 RX ADMIN — ATORVASTATIN CALCIUM SCH MG: 10 TABLET, FILM COATED ORAL at 08:36

## 2020-05-25 NOTE — P.PN
Progress Note - Text


Progress Note Date: 05/25/20





Interval History:


Patient was seen sitting in on group this morning and was directable and agree

able to speak with writer in the office.  Patient appeared to be nonverbal today

and simply stared at writer in the office as she sat in the chair.  Patient for 

the most part shook her head every time writer asked her a question.  Patient 

would not answer any questions today.  She was however partially cooperative 

with writer checking her muscle tone for any rigidity which was not found.  

Patient would not cooperate with the rest of the neurological exam.  She ended 

the interview by getting up and walking out the door.





Mental Status Exam:


General Appearance: Patient appears to be overweight, stated age is alert, 

directable, however nonverbal today. Patient appears to have improved hygiene 

and grooming.


Behavior: Patient is seated without any agitated behavior.  Nonverbal


Speech: Nonverbal


Mood/Affect: Unable to assess.


Suicidality/Homicidality: Unable to assess


Perceptions: Unable to assess


Though content/process: Unable to assess


Memory and concentration: Patient not cooperative, unable to examine.


Judgment and insight: Limited





Assessment


Schizophrenia


Nicotine dependence





Plan:


-Patient continues to meet criteria for inpatient psychiatric admission for 

symptom stabilization and safety. Patient is currently on a active court order. 

Patient signed for medications consent and was placed in chart.


-Medications: Will hold paliperidone by mouth at this time.  Can continue with m

elatonin 6 mg nightly for sleep and Cogentin 0.5 mg twice a day for EPS 

prophylaxis.


-Patient's acute change in her mental status and being nonverbal, would like to 

rule out a stroke or any acute cerebral event and ordered CBC with differential,

comprehensive metabolic panel, creatinine kinase, and head CT with and without 

contrast.  Depending on the results of these tests, will consult neurology if 

indicated.


-When necessary Geodon and Ativan for agitation/aggression.


-NRT - nicotine patch


-SW on board for discharge planning.  Encouraged the patient to participate in 

milieu.

## 2020-05-26 RX ADMIN — PALIPERIDONE SCH MG: 3 TABLET, EXTENDED RELEASE ORAL at 22:04

## 2020-05-26 RX ADMIN — LEVOTHYROXINE SODIUM SCH MCG: 50 TABLET ORAL at 08:11

## 2020-05-26 RX ADMIN — Medication SCH MG: at 22:03

## 2020-05-26 RX ADMIN — VALPROIC ACID SCH: 250 SOLUTION ORAL at 22:04

## 2020-05-26 RX ADMIN — BENZTROPINE MESYLATE SCH MG: 0.5 TABLET ORAL at 08:12

## 2020-05-26 RX ADMIN — ATORVASTATIN CALCIUM SCH MG: 10 TABLET, FILM COATED ORAL at 08:12

## 2020-05-26 RX ADMIN — ATENOLOL SCH MG: 50 TABLET ORAL at 08:12

## 2020-05-26 RX ADMIN — PALIPERIDONE SCH MG: 3 TABLET, EXTENDED RELEASE ORAL at 13:12

## 2020-05-26 RX ADMIN — VALPROIC ACID SCH MG: 250 SOLUTION ORAL at 13:12

## 2020-05-26 RX ADMIN — BENZTROPINE MESYLATE SCH MG: 0.5 TABLET ORAL at 22:03

## 2020-05-26 NOTE — P.PN
Progress Note - Text


Progress Note Date: 05/26/20





Interval History:


Patient was seen sitting in on group this morning and was directable and agree

able to speak with writer in the office.  Patient continues to be nonverbal and 

selectively mute with certain people on the unit.  She refused to speak today to

writer and shook her head and made other signs with her hands during the 

interview.  She pointed to the ceiling several times and when asked what she was

pointing to patient repeatedly shook her hand and her head and confirm that she 

was talking about God.  She was also noticed by other staff members to have her 

hands up and singing and dancing in the hallways at times and making bizarre 

gestures. Patient for the most part shook her head every time writer asked her a

question.  She ended the interview by getting up and walking out the door.





Mental Status Exam:


General Appearance: Patient appears to be overweight, stated age is alert, 

directable, however nonverbal today. Patient appears to have improved hygiene 

and grooming.


Behavior: Patient is seated without any agitated behavior.  Nonverbal.  Made 

several bizarre hand gestures and pointed to the ceiling.


Speech: Nonverbal


Mood/Affect: Unable to assess.


Suicidality/Homicidality: Unable to assess


Perceptions: Unable to assess


Though content/process: Unable to assess


Memory and concentration: Patient not cooperative, unable to examine.


Judgment and insight: Poor





Assessment


Schizophrenia


Nicotine dependence





Plan:


-Patient continues to meet criteria for inpatient psychiatric admission for 

symptom stabilization and safety. Patient is currently on a active court order. 

Patient signed for medications consent and was placed in chart.


-Medications: Restarted paliperidone 3 mg twice a day by mouth for psychosis.  

Added Depakene 250 mg twice a day for mood stabilization.  Can continue with 

melatonin 6 mg nightly for sleep and Cogentin 0.5 mg twice a day for EPS prophy

laxis.


-Patient had a computed tomography scan completed on 5/25/2020 - did not reveal 

any acute changes.  CBC and comprehensive metabolic panel reviewed, no 

significant abnormalities.  CK - 2.6.


-When necessary Geodon and Ativan for agitation/aggression.


-NRT - nicotine patch


-SW on board for discharge planning.  Encouraged the patient to participate in 

milieu.

## 2020-05-27 RX ADMIN — ATORVASTATIN CALCIUM SCH MG: 10 TABLET, FILM COATED ORAL at 08:48

## 2020-05-27 RX ADMIN — Medication SCH MG: at 21:10

## 2020-05-27 RX ADMIN — BENZTROPINE MESYLATE SCH MG: 0.5 TABLET ORAL at 08:48

## 2020-05-27 RX ADMIN — ATENOLOL SCH MG: 50 TABLET ORAL at 08:48

## 2020-05-27 RX ADMIN — PALIPERIDONE SCH: 3 TABLET, EXTENDED RELEASE ORAL at 08:51

## 2020-05-27 RX ADMIN — PALIPERIDONE SCH MG: 3 TABLET, EXTENDED RELEASE ORAL at 21:10

## 2020-05-27 RX ADMIN — BENZTROPINE MESYLATE SCH MG: 0.5 TABLET ORAL at 21:10

## 2020-05-27 RX ADMIN — VALPROIC ACID SCH: 250 SOLUTION ORAL at 08:51

## 2020-05-27 RX ADMIN — PALIPERIDONE SCH MG: 3 TABLET, EXTENDED RELEASE ORAL at 08:48

## 2020-05-27 RX ADMIN — LEVOTHYROXINE SODIUM SCH MCG: 50 TABLET ORAL at 06:15

## 2020-05-27 RX ADMIN — VALPROIC ACID SCH MG: 250 SOLUTION ORAL at 21:10

## 2020-05-27 RX ADMIN — VALPROIC ACID SCH MG: 250 SOLUTION ORAL at 08:48

## 2020-05-27 NOTE — P.PN
Progress Note - Text


Progress Note Date: 05/27/20





Interval History:


Patient was seen coming out of the bathroom in her room this afternoon and was 

directable and agreeable to speak with writer in the office.  Patient was verbal

today and more cooperative during conversation.  She states that "I feel great" 

when asked how her mood was doing.  Patient continues to shake her head and nod 

her head during several questions however is speaking more and more polite.  

Patient continues to have poor insight and judgment.  She refused medications 

yesterday and this morning and states that "I don't care from a court order him 

not taking that stuff".  Patient was informed that if she does not take her by 

mouth medications and that they will give her IM Haldol.  She states that she 

slept well last night and offers no other complaints.  She claims that she has a

fair appetite.  At this time she denies any auditory or visual hallucinations.  

She denies any suicidal or homicidal ideations intent or plan.





Mental Status Exam:


General Appearance: Patient appears to be overweight, stated age is alert, 

directable, more cooperative today. Patient appears to have improved hygiene and

grooming.


Behavior: Patient is seated without any agitated behavior.  More cooperative 

today.


Speech: Fluent, nonpressured.


Mood/Affect: Patient states her mood is "great" and affect is constricted.


Suicidality/Homicidality: Denies


Perceptions: Denies any auditory or visual hallucinations.


Though content/process: Patient is argumentative and resistant.  Logical and 

concrete.


Memory and concentration: Alert and oriented 3, improving attention span


Judgment and insight: Poor, mildly improving





Assessment


Schizophrenia


Nicotine dependence





Plan:


-Patient continues to meet criteria for inpatient psychiatric admission for 

symptom stabilization and safety. Patient is currently on a active court order. 

Patient signed for medications consent and was placed in chart.


-Medications: Continue with paliperidone 3 mg twice a day by mouth for 

psychosis.  Continue with Depakene 250 mg twice a day for mood stabilization.  

Can continue with melatonin 6 mg nightly for sleep and Cogentin 0.5 mg twice a 

day for EPS prophylaxis.  Added Haldol 4 mg twice a day IM when necessary if 

patient is refusing medications as she is court ordered.


-Patient had a computed tomography scan completed on 5/25/2020 - did not reveal 

any acute changes.  CBC and comprehensive metabolic panel reviewed, no 

significant abnormalities.  CK - 2.6.


-When necessary Geodon and Ativan for agitation/aggression.


-NRT - nicotine patch


-SW on board for discharge planning.  Encouraged the patient to participate in 

milieu.

## 2020-05-28 RX ADMIN — LEVOTHYROXINE SODIUM SCH: 50 TABLET ORAL at 07:23

## 2020-05-28 RX ADMIN — VALPROIC ACID SCH MG: 250 SOLUTION ORAL at 21:03

## 2020-05-28 RX ADMIN — VALPROIC ACID SCH MG: 250 SOLUTION ORAL at 08:49

## 2020-05-28 RX ADMIN — PALIPERIDONE SCH MG: 3 TABLET, EXTENDED RELEASE ORAL at 21:04

## 2020-05-28 RX ADMIN — PALIPERIDONE SCH MG: 3 TABLET, EXTENDED RELEASE ORAL at 08:49

## 2020-05-28 RX ADMIN — BENZTROPINE MESYLATE SCH MG: 0.5 TABLET ORAL at 21:03

## 2020-05-28 RX ADMIN — BENZTROPINE MESYLATE SCH MG: 0.5 TABLET ORAL at 08:49

## 2020-05-28 RX ADMIN — ATORVASTATIN CALCIUM SCH MG: 10 TABLET, FILM COATED ORAL at 08:49

## 2020-05-28 RX ADMIN — LEVOTHYROXINE SODIUM SCH MCG: 50 TABLET ORAL at 07:08

## 2020-05-28 RX ADMIN — ATENOLOL SCH MG: 50 TABLET ORAL at 08:49

## 2020-05-28 RX ADMIN — Medication SCH MG: at 21:04

## 2020-05-28 NOTE — P.PN
Progress Note - Text


Progress Note Date: 05/28/20





Interval History:


Patient was seen participating in group today is doing activities and was dire

ctable and agreeable to speak with writer in the office.  Patient was more 

appropriate today and more cooperative during conversation.  She states that she

is doing better and was rocking back and forth in her chair initially.  She 

claims that her mood is "great".  She is not hyperverbal and is not displaying 

any flight of ideas or distractibility.  She appears to have improvement in her 

insight and judgment.  She claims that she has been going to groups and 

participating as best as she can.  She continues to speak about God and 

receiving messages from him however is not endorsing or preoccupied with 

witchcraft or any other delusions today.  She took her medications yesterday and

today.  She states that she slept well last night and offers no other 

complaints.  She claims that she has a fair appetite.  At this time she denies 

any auditory or visual hallucinations.  She denies any suicidal or homicidal 

ideations intent or plan.





Mental Status Exam:


General Appearance: Patient appears to be overweight, stated age is alert, 

directable, more cooperative today. Patient appears to have improved hygiene and

grooming.


Behavior: Patient is seated without any agitated behavior.  More cooperative 

today.


Speech: Fluent, nonpressured.


Mood/Affect: Patient states her mood is "great" and affect is constricted.


Suicidality/Homicidality: Denies


Perceptions: Denies any auditory or visual hallucinations.


Though content/process: Patient is argumentative and resistant.  Logical and 

concrete.  Spoke about God and religiously preoccupied.


Memory and concentration: Alert and oriented 3, improving attention span


Judgment and insight: Poor, mildly improving





Assessment


Schizophrenia


Nicotine dependence





Plan:


-Patient continues to meet criteria for inpatient psychiatric admission for 

symptom stabilization and safety. Patient is currently on a active court order. 

Patient signed for medications consent and was placed in chart.


-Medications: Continue with paliperidone 3 mg twice a day by mouth for 

psychosis.  Continue with Depakene 250 mg twice a day for mood stabilization.  

Can continue with melatonin 6 mg nightly for sleep and Cogentin 0.5 mg twice a 

day for EPS prophylaxis.  Continue with Haldol 4 mg twice a day IM when 

necessary if patient is refusing medications as she is court ordered.  Patient 

will be receiving her monthly Invega Sustenna long-acting injection 234 mg 

tomorrow.


-Patient had a computed tomography scan completed on 5/25/2020 - did not reveal 

any acute changes.  CBC and comprehensive metabolic panel reviewed, no 

significant abnormalities.  CK - 2.6.


-When necessary Geodon and Ativan for agitation/aggression.


-NRT - nicotine patch


-SW on board for discharge planning.  Encouraged the patient to participate in 

milieu.

## 2020-05-29 RX ADMIN — VALPROIC ACID SCH MG: 250 SOLUTION ORAL at 08:44

## 2020-05-29 RX ADMIN — ATENOLOL SCH MG: 50 TABLET ORAL at 10:05

## 2020-05-29 RX ADMIN — ATORVASTATIN CALCIUM SCH MG: 10 TABLET, FILM COATED ORAL at 08:44

## 2020-05-29 RX ADMIN — BENZTROPINE MESYLATE SCH MG: 0.5 TABLET ORAL at 08:44

## 2020-05-29 RX ADMIN — PALIPERIDONE SCH MG: 3 TABLET, EXTENDED RELEASE ORAL at 08:44

## 2020-05-29 RX ADMIN — Medication SCH MG: at 22:13

## 2020-05-29 RX ADMIN — BENZTROPINE MESYLATE SCH MG: 0.5 TABLET ORAL at 22:12

## 2020-05-29 RX ADMIN — VALPROIC ACID SCH MG: 250 SOLUTION ORAL at 22:12

## 2020-05-29 RX ADMIN — PALIPERIDONE SCH MG: 3 TABLET, EXTENDED RELEASE ORAL at 22:12

## 2020-05-29 RX ADMIN — LEVOTHYROXINE SODIUM SCH MCG: 50 TABLET ORAL at 06:17

## 2020-05-29 NOTE — XR
EXAMINATION TYPE: XR chest 1V portable

 

DATE OF EXAM: 5/29/2020

 

COMPARISON: 6/15/2016

 

HISTORY: Altered mental status

Chest pain

TECHNIQUE: Single view portable

 

FINDINGS: There is no heart failure nor confluent pneumonic infiltrate. Costophrenic angles are clear
. Bony thorax is intact.

 

IMPRESSION: Normal chest. There is improved inspiration compared to last exam.

## 2020-05-29 NOTE — P.PN
Progress Note - Text


Progress Note Date: 05/29/20





Interval History:


Patient was seen participating in group today and was directable and agreeable 

to speak with writer in the office.  Patient was more appropriate today with 

writer however continues to be argumentative and uncooperative about her 

medications.  She states that she is taking her by mouth medications and wants 

to only take them and is refusing to take her long-acting injection.  She claims

that "it's giving me sciatic nerve pain".  She is less religiously preoccupied 

today.  She claims that her mood is "great today".  She is not hyperverbal and 

is not displaying any flight of ideas or distractibility.  She claims that she 

has been going to groups and participating as best as she can.  She continues to

speak about God at times and states that she cannot drink water on Friday due to

her Buddhist.  She took her medications yesterday and today.  She states that 

she slept well last night and offers no other complaints.  She claims that she 

has a fair appetite.  At this time she denies any auditory or visual 

hallucinations.  She denies any suicidal or homicidal ideations intent or plan.





Mental Status Exam:


General Appearance: Patient appears to be overweight, stated age is alert, 

argumentative about her medications, more cooperative today. Patient appears to 

have improved hygiene and grooming.


Behavior: Patient is seated without any agitated behavior. 


Speech: Fluent, nonpressured.


Mood/Affect: Patient states her mood is "great today" and affect is constricted.


Suicidality/Homicidality: Denies


Perceptions: Denies any auditory or visual hallucinations.


Though content/process: Patient is argumentative and resistant.  Logical and 

concrete.  Improvement in Jew preoccupation.


Memory and concentration: Alert and oriented 3, improving attention span


Judgment and insight: Poor, mildly improving





Assessment


Schizophrenia


Nicotine dependence





Plan:


-Patient continues to meet criteria for inpatient psychiatric admission for 

symptom stabilization and safety. Patient is currently on a active court order. 

Patient signed for medications consent and was placed in chart.


-Medications: Continue with paliperidone 3 mg twice a day by mouth for 

psychosis.  We'll switch Depakene syrup to Depakote 250 mg twice a day for mood 

stabilization, we'll consider increasing if needed.  Can continue with melatonin

6 mg nightly for sleep and Cogentin 0.5 mg twice a day for EPS prophylaxis.  

Continue with Haldol 4 mg twice a day IM when necessary if patient is refusing 

medications as she is court ordered.  Patient will be receiving her monthly 

Invega Sustenna long-acting injection 234 mg today.


-Patient had a computed tomography scan completed on 5/25/2020 - did not reveal 

any acute changes.  CBC and comprehensive metabolic panel reviewed, no 

significant abnormalities.  CK - 2.6.


-When necessary Geodon and Ativan for agitation/aggression.


-NRT - nicotine patch


-SW on board for discharge planning.  Encouraged the patient to participate in 

milieu.   looking into placement for patient and also guardianship.

## 2020-05-30 LAB
ANION GAP SERPL CALC-SCNC: 7 MMOL/L
BASOPHILS # BLD AUTO: 0 K/UL (ref 0–0.2)
BASOPHILS NFR BLD AUTO: 0 %
BUN SERPL-SCNC: 14 MG/DL (ref 7–17)
CALCIUM SPEC-MCNC: 9.1 MG/DL (ref 8.4–10.2)
CHLORIDE SERPL-SCNC: 107 MMOL/L (ref 98–107)
CO2 SERPL-SCNC: 27 MMOL/L (ref 22–30)
EOSINOPHIL # BLD AUTO: 0.1 K/UL (ref 0–0.7)
EOSINOPHIL NFR BLD AUTO: 2 %
ERYTHROCYTE [DISTWIDTH] IN BLOOD BY AUTOMATED COUNT: 4.04 M/UL (ref 3.8–5.4)
ERYTHROCYTE [DISTWIDTH] IN BLOOD: 12.1 % (ref 11.5–15.5)
GLUCOSE SERPL-MCNC: 104 MG/DL (ref 74–99)
HCT VFR BLD AUTO: 40 % (ref 34–46)
HGB BLD-MCNC: 13.7 GM/DL (ref 11.4–16)
LYMPHOCYTES # SPEC AUTO: 2.1 K/UL (ref 1–4.8)
LYMPHOCYTES NFR SPEC AUTO: 39 %
MCH RBC QN AUTO: 33.8 PG (ref 25–35)
MCHC RBC AUTO-ENTMCNC: 34.2 G/DL (ref 31–37)
MCV RBC AUTO: 98.8 FL (ref 80–100)
MONOCYTES # BLD AUTO: 0.3 K/UL (ref 0–1)
MONOCYTES NFR BLD AUTO: 6 %
NEUTROPHILS # BLD AUTO: 2.7 K/UL (ref 1.3–7.7)
NEUTROPHILS NFR BLD AUTO: 51 %
PLATELET # BLD AUTO: 147 K/UL (ref 150–450)
POTASSIUM SERPL-SCNC: 4.3 MMOL/L (ref 3.5–5.1)
SODIUM SERPL-SCNC: 141 MMOL/L (ref 137–145)
WBC # BLD AUTO: 5.3 K/UL (ref 3.8–10.6)

## 2020-05-30 RX ADMIN — ATENOLOL SCH MG: 50 TABLET ORAL at 10:54

## 2020-05-30 RX ADMIN — BENZTROPINE MESYLATE SCH MG: 0.5 TABLET ORAL at 22:04

## 2020-05-30 RX ADMIN — VALPROIC ACID SCH MG: 250 SOLUTION ORAL at 22:04

## 2020-05-30 RX ADMIN — VALPROIC ACID SCH MG: 250 SOLUTION ORAL at 10:54

## 2020-05-30 RX ADMIN — ATORVASTATIN CALCIUM SCH MG: 10 TABLET, FILM COATED ORAL at 10:55

## 2020-05-30 RX ADMIN — PALIPERIDONE SCH MG: 3 TABLET, EXTENDED RELEASE ORAL at 22:04

## 2020-05-30 RX ADMIN — BENZTROPINE MESYLATE SCH MG: 0.5 TABLET ORAL at 10:55

## 2020-05-30 RX ADMIN — LEVOTHYROXINE SODIUM SCH MCG: 50 TABLET ORAL at 05:33

## 2020-05-30 RX ADMIN — Medication SCH MG: at 22:04

## 2020-05-30 NOTE — P.PN
Progress Note - Text


Progress Note Date: 05/30/20





Interval history: Patient is seen in cross coverage today.  She is seen in her 

room.  She does seem to be sleeping and eating okay.  She describes that her 

mood is doing well.  She does describe having pain issues today and is going to 

lay down for a while.





Mental status exam: She is alert and cooperative with the interview process.  

Her speech is fluent, not rapid or pressured.  Her thought processes are 

organized.  Her mood she describes is doing well.  She does not verbalize any 

thoughts of harm to self others.  She does not display any agitation.  She does 

not verbalize any hallucinations or make any delusional type statements.





Plan: Patient will be maintained on current psychotropic medication regimen.  

Continue to monitor her ongoing response to treatment monitor for any medication

side effects.

## 2020-05-31 RX ADMIN — ATORVASTATIN CALCIUM SCH MG: 10 TABLET, FILM COATED ORAL at 10:29

## 2020-05-31 RX ADMIN — VALPROIC ACID SCH MG: 250 SOLUTION ORAL at 22:10

## 2020-05-31 RX ADMIN — Medication SCH MG: at 22:11

## 2020-05-31 RX ADMIN — BENZTROPINE MESYLATE SCH MG: 0.5 TABLET ORAL at 22:11

## 2020-05-31 RX ADMIN — VALPROIC ACID SCH MG: 250 SOLUTION ORAL at 10:29

## 2020-05-31 RX ADMIN — BENZTROPINE MESYLATE SCH MG: 0.5 TABLET ORAL at 10:29

## 2020-05-31 RX ADMIN — LEVOTHYROXINE SODIUM SCH MCG: 50 TABLET ORAL at 05:12

## 2020-05-31 RX ADMIN — PALIPERIDONE SCH MG: 3 TABLET, EXTENDED RELEASE ORAL at 22:10

## 2020-05-31 RX ADMIN — ATENOLOL SCH MG: 50 TABLET ORAL at 10:29

## 2020-05-31 NOTE — P.PN
Progress Note - Text


Progress Note Date: 05/31/20





Interval history: Patient is seen again in cross coverage today.  She relays 

that she did sleep last night.  She seems to be eating well.  She does describe 

feeling tired today.  She does not verbalize any adverse psychotropic medication

side effects.





Mental status exam: She is alert and cooperative with the interview.  She is 

seen in her room.  She seems to describe her mood as "fine."  She denies any 

thoughts of harm to self or others.  She does not verbalize any hallucinations 

and does not display any agitation.





Plan: Patient will be maintained on current psychotropic medication regimen.  

Continue to monitor for any medication side effects and monitor her ongoing 

response to treatment

## 2020-06-01 RX ADMIN — VALPROIC ACID SCH MG: 250 SOLUTION ORAL at 20:13

## 2020-06-01 RX ADMIN — ACETAMINOPHEN PRN MG: 500 TABLET ORAL at 11:18

## 2020-06-01 RX ADMIN — LEVOTHYROXINE SODIUM SCH MCG: 50 TABLET ORAL at 05:30

## 2020-06-01 RX ADMIN — Medication SCH MG: at 20:13

## 2020-06-01 RX ADMIN — BENZTROPINE MESYLATE SCH MG: 0.5 TABLET ORAL at 09:34

## 2020-06-01 RX ADMIN — BENZTROPINE MESYLATE SCH MG: 0.5 TABLET ORAL at 20:13

## 2020-06-01 RX ADMIN — VALPROIC ACID SCH MG: 250 SOLUTION ORAL at 09:35

## 2020-06-01 RX ADMIN — ATORVASTATIN CALCIUM SCH MG: 10 TABLET, FILM COATED ORAL at 09:34

## 2020-06-01 RX ADMIN — ATENOLOL SCH MG: 50 TABLET ORAL at 09:34

## 2020-06-01 NOTE — P.PN
Progress Note - Text


Progress Note Date: 06/01/20





Interval History:


Patient was seen wandering the hallways this morning and was directable and ag

reeable to speak with writer in the office.  Patient was more appropriate today 

with writer.  She is taking her medications and claims that she feels it is 

helping her.  Patient's insight and judgment have been gradually improving.  

Patient did receive Invega Sustenna 234 mg IM monthly injection on 5/29/2020 and

tolerated it well.  Patient is noticed to be less religiously preoccupied today.

 She claims that her mood is "good" and offers no complaints.  She claims that 

she has been going to groups and participating as best as she can.  Patient 

claims that she is agreeable to go to an [facility] home upon discharge and also 

agreeable to have a guardian. She states that she slept well last night and 

offers no other complaints.  She claims that she has a fair appetite.  At this 

time she denies any auditory or visual hallucinations.  She denies any suicidal 

or homicidal ideations intent or plan.





Mental Status Exam:


General Appearance: Patient appears to be overweight, stated age is alert, more 

cooperative today. Patient appears to have improved hygiene and grooming.


Behavior: Patient is seated without any agitated behavior. 


Speech: Fluent, nonpressured.


Mood/Affect: Patient states her mood is "good" and affect is constricted.


Suicidality/Homicidality: Denies


Perceptions: Denies any auditory or visual hallucinations.


Though content/process: Patient is argumentative and resistant.  Logical and 

concrete.  Improvement in Scientology preoccupation.


Memory and concentration: Alert and oriented 3, improving attention span


Judgment and insight: Limited, mildly improving





Assessment


Schizophrenia


Nicotine dependence





Plan:


-Patient continues to meet criteria for inpatient psychiatric admission for 

symptom stabilization and safety. Patient is currently on a active court order. 

Patient signed for medications consent and was placed in chart.


-Medications: Discontinued paliperidone.  Continue with Depakote 250 mg twice a 

day for mood stabilization, we'll consider increasing if needed.  Can continue 

with melatonin 6 mg nightly for sleep and Cogentin 0.5 mg twice a day for EPS 

prophylaxis.  Continue with Haldol 4 mg twice a day IM when necessary if patient

is refusing medications as she is court ordered.  She received her monthly 

Invega Sustenna long-acting injection 234 mg on 5/29/2020 and tolerated it well.

 She will be due for her next monthly dose of 234 mg IM on 6/26/2020.


-Patient had a computed tomography scan completed on 5/25/2020 - did not reveal 

any acute changes.  CBC and comprehensive metabolic panel reviewed, no 

significant abnormalities.  CK - 2.6.


-When necessary Geodon and Ativan for agitation/aggression.


-NRT - nicotine patch


-SW on board for discharge planning.  Encouraged the patient to participate in 

milieu.   looking into placement for patient and also guardianship.

 Patient will likely be discharged on Wednesday to [facility].

## 2020-06-02 RX ADMIN — BENZTROPINE MESYLATE SCH MG: 0.5 TABLET ORAL at 20:05

## 2020-06-02 RX ADMIN — VALPROIC ACID SCH MG: 250 SOLUTION ORAL at 09:08

## 2020-06-02 RX ADMIN — LEVOTHYROXINE SODIUM SCH MCG: 50 TABLET ORAL at 05:48

## 2020-06-02 RX ADMIN — ACETAMINOPHEN PRN MG: 500 TABLET ORAL at 23:19

## 2020-06-02 RX ADMIN — ACETAMINOPHEN PRN MG: 500 TABLET ORAL at 01:56

## 2020-06-02 RX ADMIN — ATORVASTATIN CALCIUM SCH MG: 10 TABLET, FILM COATED ORAL at 09:08

## 2020-06-02 RX ADMIN — ATENOLOL SCH MG: 50 TABLET ORAL at 09:08

## 2020-06-02 RX ADMIN — Medication SCH MG: at 20:05

## 2020-06-02 RX ADMIN — ACETAMINOPHEN PRN MG: 500 TABLET ORAL at 15:41

## 2020-06-02 RX ADMIN — DIVALPROEX SODIUM SCH MG: 250 TABLET, FILM COATED, EXTENDED RELEASE ORAL at 20:05

## 2020-06-02 RX ADMIN — BENZTROPINE MESYLATE SCH MG: 0.5 TABLET ORAL at 09:08

## 2020-06-02 NOTE — P.PN
Progress Note - Text


Progress Note Date: 06/02/20





Interval History:


Patient was seen wandering the hallways this morning and was directable and ag

reeable to speak with writer in the office.  Patient was more appropriate today 

with writer today and also directable during the interview.  Patient was 

complaining of her feet and hips feeling sore and was somatically preoccupied 

today.  She did state that her overall mood has been improving and she has been 

going to groups and try to participate as best as she can.  She is taking her 

medications and claims that she feels it is helping her.  Patient's insight and 

judgment have been gradually improving. Patient is noticed to be less 

religiously preoccupied today.  She claims that her mood is "ok" and offers no 

complaints.   Patient continues to state that she is agreeable to go to an [facility] 

home in the near future and have her daughter as guardian. She states that she 

slept well last night and offers no other complaints.  She claims that she has a

fair appetite.  At this time she denies any auditory or visual hallucinations.  

She denies any suicidal or homicidal ideations intent or plan.





Mental Status Exam:


General Appearance: Patient appears to be overweight, stated age is alert, 

cooperative today. Patient appears to have improved hygiene and grooming.


Behavior: Patient is seated without any agitated behavior. 


Speech: Fluent, nonpressured.


Mood/Affect: Patient states her mood is "ok" and affect is constricted.


Suicidality/Homicidality: Denies


Perceptions: Denies any auditory or visual hallucinations.


Though content/process: Patient is argumentative and resistant.  Logical and 

concrete.  Less religiously preoccupation.


Memory and concentration: Alert and oriented 3, improving attention span


Judgment and insight: Limited, mildly improving





Assessment


Schizophrenia


Nicotine dependence





Plan:


-Patient continues to meet criteria for inpatient psychiatric admission for 

symptom stabilization and safety. Patient is currently on a active court order. 

Patient signed for medications consent and was placed in chart.


-Medications: Continue with Depakote 250 mg twice a day for mood stabilization. 

Can continue with melatonin 6 mg nightly for sleep and Cogentin 0.5 mg twice a 

day for EPS prophylaxis.  Continue with Haldol 4 mg twice a day IM when nece

ssary if patient is refusing medications as she is court ordered.  She received 

her monthly Invega Sustenna long-acting injection 234 mg on 5/29/2020 and 

tolerated it well.  She will be due for her next monthly dose of 234 mg IM on 

6/26/2020.


-Patient had a computed tomography scan completed on 5/25/2020 - did not reveal 

any acute changes.  CBC and comprehensive metabolic panel reviewed, no signif

icant abnormalities.  CK - 2.6.


-When necessary Geodon and Ativan for agitation/aggression.


-NRT - nicotine patch


- on board for discharge planning.  Encouraged the patient to participate in 

milieu.  Social work to communicate with [facility] to arrange for patient admitted to 

ACT team for close follow-up.   looking into placement options for 

patient however due to financial constraints, patient's daughter will be 

continuing to look for placement after patient is discharged and patient will 

need to have her condo sold in the near future.  Patient's daughter is also 

filing for guardianship.  Likely discharge back home tomorrow

## 2020-06-03 VITALS — HEART RATE: 104 BPM | SYSTOLIC BLOOD PRESSURE: 133 MMHG | DIASTOLIC BLOOD PRESSURE: 97 MMHG

## 2020-06-03 VITALS — TEMPERATURE: 98.1 F

## 2020-06-03 VITALS — RESPIRATION RATE: 16 BRPM

## 2020-06-03 RX ADMIN — DIVALPROEX SODIUM SCH MG: 250 TABLET, FILM COATED, EXTENDED RELEASE ORAL at 16:39

## 2020-06-03 RX ADMIN — ATENOLOL SCH MG: 50 TABLET ORAL at 08:56

## 2020-06-03 RX ADMIN — ACETAMINOPHEN PRN MG: 500 TABLET ORAL at 06:28

## 2020-06-03 RX ADMIN — LEVOTHYROXINE SODIUM SCH MCG: 50 TABLET ORAL at 05:53

## 2020-06-03 RX ADMIN — DIVALPROEX SODIUM SCH MG: 250 TABLET, FILM COATED, EXTENDED RELEASE ORAL at 08:56

## 2020-06-03 RX ADMIN — ATORVASTATIN CALCIUM SCH MG: 10 TABLET, FILM COATED ORAL at 08:56

## 2020-06-03 RX ADMIN — BENZTROPINE MESYLATE SCH MG: 0.5 TABLET ORAL at 16:39

## 2020-06-03 RX ADMIN — BENZTROPINE MESYLATE SCH MG: 0.5 TABLET ORAL at 08:56

## 2020-06-03 RX ADMIN — ACETAMINOPHEN PRN MG: 500 TABLET ORAL at 16:38

## 2020-06-03 NOTE — P.DS
Providers


Date of admission: 


05/20/20 16:39





Expected date of discharge: 06/03/20


Attending physician: 


Rony Pompa MD





Consults: 





                                        





05/20/20 16:46


Consult Physician Routine 


   Consulting Provider: Brendon Jacobs Jr


   Consult Reason/Comments: H and P


   Do you want consulting provider notified?: Yes











Primary care physician: 


Brendon Jacobs








- Discharge Diagnosis(es)


(1) Schizophrenia


Current Visit: Yes   Status: Acute   Priority: High   





(2) Nicotine dependence


Current Visit: Yes   Status: Acute   Priority: Low   


Hospital Course: 





Admission HPI:


Patient is a 64-year-old  female with a chronic history of 

schizophrenia, is  and lives alone in an apartment and has 3 daughters. 

Patient presented to the hospital at the direction of her time in her care 

physician for "bizarre symptoms".  As per ER report states that patient claims 

that the "Lord brought her here" when asked why she came to the hospital.  

Patient also at that time endorsed depression and paranoia.  Patient was last 

discharged from the mental health unit in 10/2019 and was placed on Invega 

Sustenna 117 mg and had been titrated up to a dose of 156 mg monthly.  Patient 

claims that she was "taking too much of my Lamictal and felt that I was under 

the spell of witchcraft".  She claims that she has been feeling like this for 

several weeks.  She did endorse some paranoia as well as home.  She claims that 

she felt certain people were "putting me on a curse".  She claims that she does 

not feel that way at this time.  She states that her last injection of Invega 

Sustenna was on April 28 and claims that she gets it monthly.  She states that 

her "legs twitch" when she is on the medication and she is walking.  At this 

time she is not endorsing paranoia and claims that her mood is "good" and denies

any depression.  She claims that she sleeps in "catnaps" during the day and at 

night.  She states her appetite is fair. Patient denies any suicidal or 

homicidal ideations intent or plan.  At this time patient denies any auditory or

visual hallucinations.  Patient admits to using cigarettes daily





Hospital course:


Upon admission to the unit patient was initially bizarre and floridly psychotic.

Patient was however directable and agreeable to commence treatment as patient 

was currently on a active treatment order. Patient initially he was bizarre and 

delusional however as treatment progressed patient got along well with other 

patients on the unit and followed unit protocol.  Patient was compliant with the

medications and denied any side effects throughout hospital course.  Patient was

started on 250 mg twice a day for mood stabilization, melatonin 6 mg nightly for

sleep, Cogentin 0.5 mg twice a day for EPS prophylaxis, paliperidone was 

titrated up to a dose of 3 mg twice a day for psychosis and as adjunct as 

patient is currently on Invega Sustenna long-acting injection 234 mg.  She 

received a dose of Invega Sustenna 234 mg IM on 5/29/2020 and tolerated it well,

will be due for her next monthly dose on 6/26/2020. added Inderal 20 mg by mouth

twice a day when necessary for akathisia.  Patient spoke of her stressors and 

engaged in therapy both group and individual.  Patient was also seen by medical 

team for history and physical exam. patient received a computed tomography scan 

brain on 5/25/2020 for abrupt changes in her mental status and mutism, which did

not reveal any acute changes. Patient was negative for COVID-19. Throughout the 

course of the hospitalization patient gradually improved with regards to mood 

lability, psychosis, sleep and became future oriented with improved insight and 

judgment. On the day of discharge patient denied any suicidal or homicidal 

ideations intent or plan denied any auditory or visual hallucinations. Patient 

endorsed wanting to live for her family and her future.  The patient denied any 

access to guns or weapons.  Patient denied any paranoia however does have 

chronic Confucianism preoccupation, which improved during hospitalization and 

treatment.  Patient does not have a significant history of substance abuse 

however was counseled on abstaining from all substances including alcohol and 

marijuana. Patient was also counseled on the medications and need for regular 

compliance and was encouraged to follow-up with their outpatient appointment for

mental health and also for primary care.  Prior to discharge a family meeting 

will be arranged by  to answer any questions and ensure safety upon

discharge.  also communicated with patient's daughter to file for 

guardianship and also patient and daughter are both agreeable that patient would

be better off in a group home/AFC in the near future and daughter will be 

helping patient sell her condo and look for new placement afterwards.





Mental status exam:


General Appearance: Patient appears to be stated age is overweight, alert, 

pleasant, and attempts to be cooperative. Patient is in no acute distress and 

has fair hygiene and grooming 


Behavior: Patient is calmly seated without any agitated behavior.


Speech: Patient's speech is fluent and nonpressured. 


Mood/Affect: Patient reports their mood is "good", affect is congruent and 

euthymic. 


Suicidality/Homicidality:  Patient denies having any suicidal or homicidal 

ideation intent or plan.  


Perceptions: Patient denies any auditory or visual hallucinations.  


Though content/process: There is no evidence of any delusional thought content 

and thought process is linear and goal-directed. more future oriented. chronic 

Confucianism preoccupation.


Memory and concentration: AOX3, grossly intact for the purposes of this session.

Can spell "WORLD" backwards correctly.


Judgment and insight: Improved with guarded prognosis





Impression:


schizophrenia


Nicotine dependence





Plan:


-Continue with discharge today as patient has improved and stabilized 

psychiatrically and is not currently an imminent threat to herself and/or 

others.


-Continue medications: continue with Depakote 250 mg twice a day for mood 

stabilization, melatonin 6 mg nightly for sleep, Cogentin 0.5 mg twice a day for

EPS prophylaxis. added Inderal 20 mg by mouth twice a day when necessary for 

akathisia. She received a dose of Invega Sustenna 234 mg IM on 5/29/2020 and 

tolerated it well, will be due for her next monthly dose on 6/26/2020


-Patient was counseled on the need for medication compliance and appropriate 

follow-up at mental health and also primary care for medical issues.  Patient 

verbalized understanding and agreed.


-Social work to arrange for and conduct family meeting to ensure safety upon 

discharge and answer any questions/concerns.  also communicated 

with patient's daughter to file for guardianship and also patient and daughter 

are both agreeable that patient would be better off in a group home/AFC in the 

near future and daughter will be helping patient sell her condo and look for new

placement afterwards.


-Social work also to arrange for patients follow up appointments with [facility] for 

psychiatric care along with follow up with primary care provider.  

communicated to [facility] to have patient referred to ACT team for close follow-up.


-Patient counseled on abstaining from recreational drugs and marijuana and 

alcohol. Was informed/educated on the adverse effects on their physical and 

mental health. Patient verbally agreed and understood.


-Patient was instructed to return to the hospital or seek immediate medical care

if their psychiatric or medical symptoms do worsen or reoccur.








                                    Allergies











Allergy/AdvReac Type Severity Reaction Status Date / Time


 


nicotine Allergy Severe Rash/Hives Verified 05/25/20 09:37


 


ibuprofen [From Motrin] Allergy  Swelling Verified 05/20/20 14:01


 


NSAIDS (Non-Steroidal Allergy  Rash/Hives Verified 05/20/20 14:01





Anti-Inflamma     











                               Laboratory Results











WBC  5.3 k/uL (3.8-10.6)   05/30/20  07:32    


 


RBC  4.04 m/uL (3.80-5.40)   05/30/20  07:32    


 


Hgb  13.7 gm/dL (11.4-16.0)   05/30/20  07:32    


 


Hct  40.0 % (34.0-46.0)   05/30/20  07:32    


 


MCV  98.8 fL (80.0-100.0)   05/30/20  07:32    


 


MCH  33.8 pg (25.0-35.0)   05/30/20  07:32    


 


MCHC  34.2 g/dL (31.0-37.0)   05/30/20  07:32    


 


RDW  12.1 % (11.5-15.5)   05/30/20  07:32    


 


Plt Count  147 k/uL (150-450)  L  05/30/20  07:32    


 


Neutrophils %  51 %  05/30/20  07:32    


 


Lymphocytes %  39 %  05/30/20  07:32    


 


Monocytes %  6 %  05/30/20  07:32    


 


Eosinophils %  2 %  05/30/20  07:32    


 


Basophils %  0 %  05/30/20  07:32    


 


Neutrophils #  2.7 k/uL (1.3-7.7)   05/30/20  07:32    


 


Lymphocytes #  2.1 k/uL (1.0-4.8)   05/30/20  07:32    


 


Monocytes #  0.3 k/uL (0-1.0)   05/30/20  07:32    


 


Eosinophils #  0.1 k/uL (0-0.7)   05/30/20  07:32    


 


Basophils #  0.0 k/uL (0-0.2)   05/30/20  07:32    


 


Sodium  141 mmol/L (137-145)   05/30/20  07:32    


 


Potassium  4.3 mmol/L (3.5-5.1)   05/30/20  07:32    


 


Chloride  107 mmol/L ()   05/30/20  07:32    


 


Carbon Dioxide  27 mmol/L (22-30)   05/30/20  07:32    


 


Anion Gap  7 mmol/L  05/30/20  07:32    


 


BUN  14 mg/dL (7-17)   05/30/20  07:32    


 


Creatinine  0.63 mg/dL (0.52-1.04)   05/30/20  07:32    


 


Est GFR (CKD-EPI)AfAm  >90  (>60 ml/min/1.73 sqM)   05/30/20  07:32    


 


Est GFR (CKD-EPI)NonAf  >90  (>60 ml/min/1.73 sqM)   05/30/20  07:32    


 


Glucose  104 mg/dL (74-99)  H  05/30/20  07:32    


 


Estimated Ave Glu mg/dL  105   05/20/20  12:30    


 


Hemoglobin A1c  5.3 % (4.0-6.0)   05/20/20  12:30    


 


Calcium  9.1 mg/dL (8.4-10.2)   05/30/20  07:32    


 


Total Bilirubin  0.6 mg/dL (0.2-1.3)   05/25/20  10:36    


 


AST  35 U/L (14-36)   05/25/20  10:36    


 


ALT  34 U/L (4-34)   05/25/20  10:36    


 


Alkaline Phosphatase  69 U/L ()   05/25/20  10:36    


 


CK-MB (CK-2)  2.6 ng/mL (0.0-2.4)  H  05/25/20  10:36    


 


Total Protein  7.2 g/dL (6.3-8.2)   05/25/20  10:36    


 


Albumin  4.1 g/dL (3.5-5.0)   05/25/20  10:36    


 


Triglycerides  99 mg/dL (<150)   05/20/20  12:30    


 


Cholesterol  151 mg/dL (<200)   05/20/20  12:30    


 


LDL Cholesterol, Calc  83 mg/dL (0-99)   05/20/20  12:30    


 


HDL Cholesterol  48 mg/dL (40-60)   05/20/20  12:30    


 


TSH  0.376 mIU/L (0.465-4.680)  L  05/20/20  12:30    


 


Free T4  1.73 ng/dL (0.78-2.19)   05/20/20  12:30    


 


Serum Alcohol  <10 mg/dL  05/20/20  12:30    


 


Coronavirus (PCR)  Not Detected  (Not Detected)   05/29/20  19:23    











                                   Vital Signs











Temp  97.9 F   06/03/20 06:15


 


Pulse  93   06/03/20 06:15


 


Resp  16   06/03/20 06:15


 


BP  139/92   06/03/20 06:15


 


Pulse Ox  95   06/02/20 15:50











Patient Condition at Discharge: Stable





Plan - Discharge Summary


New Discharge Prescriptions: 


New


   Benztropine Mesylate [Cogentin] 0.5 mg PO BID 30 Days  tab


   Divalproex ER [Depakote ER] 250 mg PO BID 30 Days  tab.er.24h


   Atorvastatin [Lipitor] 10 mg PO DAILY 30 Days  tab


   Melatonin 6 mg PO HS 30 Days  tablet


   guaiFENesin [Mucinex] 600 mg PO Q12HR 30 Days  tablet.er


   amLODIPine [Norvasc] 10 mg PO DAILY 30 Days  tab


   Levothyroxine Sodium [Synthroid] 50 mcg PO 0600 30 Days  tab


   Atenolol [Tenormin] 50 mg PO DAILY 30 Days  tab


   Acetaminophen Tab [Tylenol] 1,000 mg PO Q8HR PRN  tab


     PRN Reason: Fever And/ Or Pain


   Paliperidone IM [Invega Sustenna] 234 mg IM AS DIRECTED #1 syr


   Propranolol [Inderal] 20 mg PO BID PRN 30 Days  tab


     PRN Reason: Akithesia





Discontinued


   Levothyroxine Sodium [Synthroid] 50 mcg PO DAILY


   amLODIPine BESYLATE [Amlodipine Besylate] 10 mg PO DAILY


   Atorvastatin Calcium [Lipitor] 10 mg PO DAILY


   Atenolol [Tenormin] 50 mg PO DAILY


   L.acidoph,Paracasei, B.lactis [Probiotic] 1 cap PO DAILY


   Paliperidone IM [Invega Sustenna] 156 mg IM Q28D


   Benztropine Mesylate [Cogentin] 0.5 mg PO TID PRN


     PRN Reason: RESTLESS LEGS


Discharge Medication List





Acetaminophen Tab [Tylenol] 1,000 mg PO Q8HR PRN  tab 06/03/20 [Rx]


Atenolol [Tenormin] 50 mg PO DAILY 30 Days  tab 06/03/20 [Rx]


Atorvastatin [Lipitor] 10 mg PO DAILY 30 Days  tab 06/03/20 [Rx]


Benztropine Mesylate [Cogentin] 0.5 mg PO BID 30 Days  tab 06/03/20 [Rx]


Divalproex ER [Depakote ER] 250 mg PO BID 30 Days  tab.er.24h 06/03/20 [Rx]


Levothyroxine Sodium [Synthroid] 50 mcg PO 0600 30 Days  tab 06/03/20 [Rx]


Melatonin 6 mg PO HS 30 Days  tablet 06/03/20 [Rx]


Paliperidone IM [Invega Sustenna] 234 mg IM AS DIRECTED #1 syr 06/03/20 [Rx]


Propranolol [Inderal] 20 mg PO BID PRN 30 Days  tab 06/03/20 [Rx]


amLODIPine [Norvasc] 10 mg PO DAILY 30 Days  tab 06/03/20 [Rx]


guaiFENesin [Mucinex] 600 mg PO Q12HR 30 Days  tablet.er 06/03/20 [Rx]








Follow up Appointment(s)/Referral(s): 


[facility] [Outside] - 06/09/20 9:00 am (6-9-20 @ 9:00 with Amber Polk by

 phone.)


Brendon Jacobs Jr,  [Primary Care Provider] - 1-2 days


Activity/Diet/Wound Care/Special Instructions: 


Activity and diet as tolerated. Avoid the use of street drugs and alcohol. Take 

all medications as prescribed. When you are in need of refills on your 

medications please contact your medical provider and/or outpatient psychiatrist 

to have this done. Please go to scheduled outpatient appointment for aftercare 

treatment. If symptoms return or become worse, call the crisis line at 

[phone] and/or go to the nearest emergency room for evaluation.


Discharge Disposition: HOME SELF-CARE

## 2020-06-05 ENCOUNTER — HOSPITAL ENCOUNTER (INPATIENT)
Dept: HOSPITAL 47 - EC | Age: 65
LOS: 10 days | Discharge: HOME | DRG: 885 | End: 2020-06-15
Attending: PSYCHIATRY & NEUROLOGY | Admitting: PSYCHIATRY & NEUROLOGY
Payer: COMMERCIAL

## 2020-06-05 VITALS — BODY MASS INDEX: 40.4 KG/M2

## 2020-06-05 DIAGNOSIS — F20.9: Primary | ICD-10-CM

## 2020-06-05 DIAGNOSIS — E07.9: ICD-10-CM

## 2020-06-05 DIAGNOSIS — Z82.1: ICD-10-CM

## 2020-06-05 DIAGNOSIS — Z79.890: ICD-10-CM

## 2020-06-05 DIAGNOSIS — H54.62: ICD-10-CM

## 2020-06-05 DIAGNOSIS — F17.210: ICD-10-CM

## 2020-06-05 DIAGNOSIS — Z83.3: ICD-10-CM

## 2020-06-05 DIAGNOSIS — Z79.899: ICD-10-CM

## 2020-06-05 DIAGNOSIS — G47.00: ICD-10-CM

## 2020-06-05 DIAGNOSIS — Z71.6: ICD-10-CM

## 2020-06-05 DIAGNOSIS — Z82.49: ICD-10-CM

## 2020-06-05 DIAGNOSIS — Z82.69: ICD-10-CM

## 2020-06-05 DIAGNOSIS — Z98.51: ICD-10-CM

## 2020-06-05 DIAGNOSIS — E66.3: ICD-10-CM

## 2020-06-05 DIAGNOSIS — G89.29: ICD-10-CM

## 2020-06-05 DIAGNOSIS — Z88.8: ICD-10-CM

## 2020-06-05 DIAGNOSIS — F41.9: ICD-10-CM

## 2020-06-05 DIAGNOSIS — Z71.3: ICD-10-CM

## 2020-06-05 DIAGNOSIS — M25.551: ICD-10-CM

## 2020-06-05 DIAGNOSIS — I10: ICD-10-CM

## 2020-06-05 DIAGNOSIS — M25.552: ICD-10-CM

## 2020-06-05 DIAGNOSIS — G62.9: ICD-10-CM

## 2020-06-05 DIAGNOSIS — Z88.6: ICD-10-CM

## 2020-06-05 LAB
ALBUMIN SERPL-MCNC: 4.1 G/DL (ref 3.5–5)
ALP SERPL-CCNC: 65 U/L (ref 38–126)
ALT SERPL-CCNC: 32 U/L (ref 4–34)
ANION GAP SERPL CALC-SCNC: 9 MMOL/L
AST SERPL-CCNC: 47 U/L (ref 14–36)
BASOPHILS # BLD AUTO: 0 K/UL (ref 0–0.2)
BASOPHILS NFR BLD AUTO: 0 %
BUN SERPL-SCNC: 11 MG/DL (ref 7–17)
CALCIUM SPEC-MCNC: 9.5 MG/DL (ref 8.4–10.2)
CHLORIDE SERPL-SCNC: 106 MMOL/L (ref 98–107)
CO2 SERPL-SCNC: 23 MMOL/L (ref 22–30)
EOSINOPHIL # BLD AUTO: 0.1 K/UL (ref 0–0.7)
EOSINOPHIL NFR BLD AUTO: 1 %
ERYTHROCYTE [DISTWIDTH] IN BLOOD BY AUTOMATED COUNT: 4.33 M/UL (ref 3.8–5.4)
ERYTHROCYTE [DISTWIDTH] IN BLOOD: 11.9 % (ref 11.5–15.5)
GLUCOSE SERPL-MCNC: 110 MG/DL (ref 74–99)
HCT VFR BLD AUTO: 42.3 % (ref 34–46)
HGB BLD-MCNC: 14.4 GM/DL (ref 11.4–16)
LYMPHOCYTES # SPEC AUTO: 2.3 K/UL (ref 1–4.8)
LYMPHOCYTES NFR SPEC AUTO: 29 %
MCH RBC QN AUTO: 33.2 PG (ref 25–35)
MCHC RBC AUTO-ENTMCNC: 34 G/DL (ref 31–37)
MCV RBC AUTO: 97.6 FL (ref 80–100)
MONOCYTES # BLD AUTO: 0.5 K/UL (ref 0–1)
MONOCYTES NFR BLD AUTO: 7 %
NEUTROPHILS # BLD AUTO: 4.9 K/UL (ref 1.3–7.7)
NEUTROPHILS NFR BLD AUTO: 62 %
PH UR: 6.5 [PH] (ref 5–8)
PLATELET # BLD AUTO: 141 K/UL (ref 150–450)
POTASSIUM SERPL-SCNC: 3.9 MMOL/L (ref 3.5–5.1)
PROT SERPL-MCNC: 7.4 G/DL (ref 6.3–8.2)
RBC UR QL: 2 /HPF (ref 0–5)
SODIUM SERPL-SCNC: 138 MMOL/L (ref 137–145)
SP GR UR: 1 (ref 1–1.03)
SQUAMOUS UR QL AUTO: 1 /HPF (ref 0–4)
UROBILINOGEN UR QL STRIP: <2 MG/DL (ref ?–2)
WBC # BLD AUTO: 7.9 K/UL (ref 3.8–10.6)
WBC #/AREA URNS HPF: 1 /HPF (ref 0–5)

## 2020-06-05 PROCEDURE — 99285 EMERGENCY DEPT VISIT HI MDM: CPT

## 2020-06-05 PROCEDURE — 80053 COMPREHEN METABOLIC PANEL: CPT

## 2020-06-05 PROCEDURE — 82075 ASSAY OF BREATH ETHANOL: CPT

## 2020-06-05 PROCEDURE — 85025 COMPLETE CBC W/AUTO DIFF WBC: CPT

## 2020-06-05 PROCEDURE — 81001 URINALYSIS AUTO W/SCOPE: CPT

## 2020-06-05 PROCEDURE — 80306 DRUG TEST PRSMV INSTRMNT: CPT

## 2020-06-05 PROCEDURE — 80164 ASSAY DIPROPYLACETIC ACD TOT: CPT

## 2020-06-05 RX ADMIN — DIVALPROEX SODIUM SCH MG: 500 TABLET, FILM COATED, EXTENDED RELEASE ORAL at 20:21

## 2020-06-05 RX ADMIN — BENZTROPINE MESYLATE SCH MG: 0.5 TABLET ORAL at 09:47

## 2020-06-05 RX ADMIN — ATORVASTATIN CALCIUM SCH MG: 10 TABLET, FILM COATED ORAL at 09:47

## 2020-06-05 RX ADMIN — MAGNESIUM HYDROXIDE PRN MG: 2400 SUSPENSION ORAL at 17:08

## 2020-06-05 RX ADMIN — Medication SCH MG: at 20:20

## 2020-06-05 RX ADMIN — BENZTROPINE MESYLATE SCH MG: 0.5 TABLET ORAL at 20:21

## 2020-06-05 RX ADMIN — LEVOTHYROXINE SODIUM SCH MCG: 50 TABLET ORAL at 06:40

## 2020-06-05 RX ADMIN — NICOTINE POLACRILEX PRN MG: 2 GUM, CHEWING BUCCAL at 13:53

## 2020-06-05 RX ADMIN — NICOTINE POLACRILEX PRN MG: 2 GUM, CHEWING BUCCAL at 19:09

## 2020-06-05 RX ADMIN — ATENOLOL SCH MG: 50 TABLET ORAL at 09:47

## 2020-06-05 NOTE — ED
Psych HPI





- General


Chief Complaint: Psychiatric Symptoms


Stated Complaint: Mental health


Time Seen by Provider: 06/05/20 00:35


Source: patient, police


Mode of arrival: ambulatory





- History of Present Illness


Initial Comments: 


Isabel is a 64-year-old female with extensive psychiatric history with recent 2 

week admission to our hospital.  She was discharged on Silvia 3 with the plan for 

outpatient follow-up however patient reports that since being discharged home 

she's not been compliant with her psych medications, she reports she hasn't 

followed up with anybody and her daughters have given by the Pompa see her.  

Patient returns to the ER today in a manic state








- Related Data


                                  Previous Rx's











 Medication  Instructions  Recorded


 


Acetaminophen Tab [Tylenol] 1,000 mg PO Q8HR PRN  tab 06/03/20


 


Atenolol [Tenormin] 50 mg PO DAILY 30 Days  tab 06/03/20


 


Atorvastatin [Lipitor] 10 mg PO DAILY 30 Days  tab 06/03/20


 


Benztropine Mesylate [Cogentin] 0.5 mg PO BID 30 Days  tab 06/03/20


 


Divalproex ER [Depakote ER] 250 mg PO BID 30 Days  tab.er.24h 06/03/20


 


Levothyroxine Sodium [Synthroid] 50 mcg PO 0600 30 Days  tab 06/03/20


 


Melatonin 6 mg PO HS 30 Days  tablet 06/03/20


 


Paliperidone IM [Invega Sustenna] 234 mg IM AS DIRECTED #1 syr 06/03/20


 


Propranolol [Inderal] 20 mg PO BID PRN 30 Days  tab 06/03/20


 


amLODIPine [Norvasc] 10 mg PO DAILY 30 Days  tab 06/03/20


 


guaiFENesin [Mucinex] 600 mg PO Q12HR 30 Days  tablet.er 06/03/20











                                    Allergies











Allergy/AdvReac Type Severity Reaction Status Date / Time


 


nicotine Allergy Severe Rash/Hives Verified 06/05/20 06:22


 


ibuprofen [From Motrin] Allergy  Swelling Verified 06/05/20 06:22


 


NSAIDS (Non-Steroidal Allergy  Rash/Hives Verified 06/05/20 06:22





Anti-Inflamma     














Review of Systems


ROS Statement: 


Those systems with pertinent positive or pertinent negative responses have been 

documented in the HPI.





ROS Other: All systems not noted in ROS Statement are negative.





Past Medical History


Past Medical History: Hypertension, Memory Impairment, Thyroid Disorder


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


History of Any Multi-Drug Resistant Organisms: None Reported


Past Surgical History: Tubal Ligation


Past Anesthesia/Blood Transfusion Reactions: No Reported Reaction


Past Psychological History: Anxiety, Bipolar, Depression, Schizophrenia


Smoking Status: Former smoker


Past Alcohol Use History: None Reported


Past Drug Use History: None Reported





- Past Family History


  ** Father


Family Medical History: Unable to Obtain


Additional Family Medical History / Comment(s): Father is alive at age 86 with 

history of diabetes.





  ** Mother


Additional Family Medical History / Comment(s): Mother is alive at age 85 with 

history of hypertension, temporal arteritis, peripheral vascular disease status 

post carotid surgery, blindness in the left eye.





  ** Sister(s)


Additional Family Medical History / Comment(s): Patient has 4 sisters with no 

major medical problems.  Patient has 3 daughters with no major medical problems.





General Exam





- General Exam Comments


Initial Comments: 


Physical Exam


GENERAL:


Patient is well-developed and well-nourished.  


Patient is nontoxic and well-hydrated and is in no distress.





HENT:


Normocephalic, Atraumatic. 





EYES:


PERRL, EOMI





PULMONARY:


Unlabored respirations.  





CARDIOVASCULAR:


RRR


Warm and well perfused extremities





ABDOMEN:


Non-distended





SKIN:


No rashes or bruising 





: 


Deferred





NEUROLOGIC:


Alert and oriented


Normal speech


Normal gait





MUSCULOSKELETAL:


Moving all extremities with no apparent injury 





PSYCHIATRIC:


Manic, poor historian





Limitations: no limitations





Course


                                   Vital Signs











  06/05/20 06/05/20





  00:19 03:26


 


Temperature 97.9 F 98.5 F


 


Pulse Rate 124 H 101 H


 


Respiratory 26 H 20





Rate  


 


Blood Pressure 142/84 135/60


 


O2 Sat by Pulse 97 97





Oximetry  














Medical Decision Making





- Medical Decision Making


The patient was seen and evaluated, history is obtained from patient history and

physical exam consistent with a manic period.


Psychiatric nurse is familiar with the patient agrees the patient has black 

follow-up and needs to be readmitted patient accepted the psychiatric floor








- Lab Data


                                   Lab Results











  06/05/20 06/05/20 Range/Units





  00:43 00:43 


 


Urine Color  Light Yellow   


 


Urine Appearance  Clear   (Clear)  


 


Urine pH  6.5   (5.0-8.0)  


 


Ur Specific Gravity  1.004   (1.001-1.035)  


 


Urine Protein  Negative   (Negative)  


 


Urine Glucose (UA)  Negative   (Negative)  


 


Urine Ketones  Negative   (Negative)  


 


Urine Blood  Small H   (Negative)  


 


Urine Nitrite  Negative   (Negative)  


 


Urine Bilirubin  Negative   (Negative)  


 


Urine Urobilinogen  <2.0   (<2.0)  mg/dL


 


Ur Leukocyte Esterase  Negative   (Negative)  


 


Urine RBC  2   (0-5)  /hpf


 


Urine WBC  1   (0-5)  /hpf


 


Ur Squamous Epith Cells  1   (0-4)  /hpf


 


Urine Bacteria  Moderate H   (None)  /hpf


 


Urine Mucus  Rare H   (None)  /hpf


 


Urine Opiates Screen   Not Detected  (NotDetected)  


 


Ur Oxycodone Screen   Not Detected  (NotDetected)  


 


Urine Methadone Screen   Not Detected  (NotDetected)  


 


Ur Propoxyphene Screen   Not Detected  (NotDetected)  


 


Ur Barbiturates Screen   Not Detected  (NotDetected)  


 


U Tricyclic Antidepress   Not Detected  (NotDetected)  


 


Ur Phencyclidine Scrn   Not Detected  (NotDetected)  


 


Ur Amphetamines Screen   Not Detected  (NotDetected)  


 


U Methamphetamines Scrn   Not Detected  (NotDetected)  


 


U Benzodiazepines Scrn   Detected H  (NotDetected)  


 


Urine Cocaine Screen   Not Detected  (NotDetected)  


 


U Marijuana (THC) Screen   Not Detected  (NotDetected)  














Disposition


Clinical Impression: 


 Manic episode





Disposition: TRANSFER TO PSYCH HOSP/UNIT


Condition: Stable


Is patient prescribed a controlled substance at d/c from ED?: No

## 2020-06-05 NOTE — P.HP
Psychiatric H&P





- .


H&P Date: 06/05/20


History & Physical: 


IDENTIFYING DATA: Patient is a 64-year-old  female with a chronic 

history of schizophrenia, is  and lives alone in an apartment and has 3 

daughters.





HPI: The Patient presented to the hospital with reports of feeling paranoid and 

"stressed".  The patient was recently discharged from the hospital.  She reports

that she was not taking her medications.  The patient reports that she was not 

feeling safe at home.  The patient reports that she believes that his mafia her 

around her and gangsters are being after her.  The patient complains of poor 

sleep at night.  As per the ER report the patient was getting increasingly 

manic.  The patient reports that she has been feeling safer and better while she

is in the hospital.  The patient reports compliance with the medications since 

she has been hospitalized.


The patient has long psychiatric history and has history of multiple psychiatric

hospitalizations in the past.  The patient was recently discharged from the unit

where she was admitted with paranoia and manic episodes.  The patient reports 

auditory hallucinations described as hearing God's voice telling her to 

persevere.  The patient denies any suicidal or homicidal ideations at this time.





PAST PSYCHIATRIC HISTORY: Patient states that she has a history of schizophrenia

and was following up with [facility].  Patient was previously on Lamictal 50 mg daily 

at bedtime, Invega Sustenna 156 mg every monthly.  Her last psychiatric 

admission was in 10/2019.  Denies any suicide attempts in the past. She has a 

history of multiple overdoses it sounds like, no suicide attempts.  She denies 

any current psychiatrist or counselor.  She says she has been hospitalized a 

lot.  She says that she feels much better not on medication.  She says she did 

very well on Prozac.  She did not like Depakote or lithium and Lamictal made her

want to sleep.  Says she has diagnosis history of schizophrenia and bipolar 

disorder.  She does admit to some history of hallucinations, auditory 

hallucinations she says that are the wrong kind.





PMH: Hypertension, thyroid disorder, blind in her left eye





ALLERGIES: Ibuprofen, NSAIDs





CHEMICAL DEPENDENCY HISTORY: Denies





FAMILY PSYCHIATRIC/SUBSTANCE USE HISTORY: States that her aunt, uncle, cousin 

and sister all have schizophrenia.





SOCIAL HISTORY: Patient was born and raised in [facility] and now 

currently lives in [place].  She lives in a apartment alone and has 3 

daughters.  She claims that she completed up to the 12th grade of school.  She 

denies any legal history.





MENTAL STATUS EXAM: 


General Appearance: Patient appears to be overweight, stated age is alert, 

directable, and attempts to cooperate. Patient appears to have poor hygiene and 

grooming.


Behavior: Patient is seated without any agitated behavior.  Attempts to 

cooperate.


Speech: Patient's speech is fluent and nonpressured.


Mood/Affect: Patient reports their mood is "okay", affect is congruent


Suicidality/Homicidality:  Patient denies having any homicidal ideation intent 

or plan. Denies any suicidal ideations intent or plan  


Perceptions: Patient denies any visual hallucinations and denies any auditory 

hallucinations


Though content/process: Patient endorses delusions of witchcraft and being in a 

curse.  Goal oriented, logical.


Memory and concentration: AOX3, grossly intact for the purposes of this session.

Can spell "WORLD" backwards


Judgment and insight: Limited





STRENGTHS/WEAKNESSES: strength is that patient is resilient. Weakness is that 

patient has poor judgment and has chronic mental illness





INTELLECT: average











                                    Allergies











Allergy/AdvReac Type Severity Reaction Status Date / Time


 


nicotine Allergy Severe Rash/Hives Verified 06/05/20 06:22


 


ibuprofen [From Motrin] Allergy  Swelling Verified 06/05/20 06:22


 


NSAIDS (Non-Steroidal Allergy  Rash/Hives Verified 06/05/20 06:22





Anti-Inflamma     








                                   Vital Signs











Temp  97.4 F L  06/05/20 03:45


 


Pulse  108 H  06/05/20 09:50


 


Resp  20   06/05/20 09:50


 


BP  142/83   06/05/20 09:50


 


Pulse Ox  97   06/05/20 03:45








                                 Intake & Output











 06/04/20 06/05/20 06/05/20





 18:59 06:59 18:59


 


Weight  101.62 kg 








                             Laboratory Last Values











WBC  7.9 k/uL (3.8-10.6)   06/05/20  07:49    


 


RBC  4.33 m/uL (3.80-5.40)   06/05/20  07:49    


 


Hgb  14.4 gm/dL (11.4-16.0)   06/05/20  07:49    


 


Hct  42.3 % (34.0-46.0)   06/05/20  07:49    


 


MCV  97.6 fL (80.0-100.0)   06/05/20  07:49    


 


MCH  33.2 pg (25.0-35.0)   06/05/20  07:49    


 


MCHC  34.0 g/dL (31.0-37.0)   06/05/20  07:49    


 


RDW  11.9 % (11.5-15.5)   06/05/20  07:49    


 


Plt Count  141 k/uL (150-450)  L  06/05/20  07:49    


 


Neutrophils %  62 %  06/05/20  07:49    


 


Lymphocytes %  29 %  06/05/20  07:49    


 


Monocytes %  7 %  06/05/20  07:49    


 


Eosinophils %  1 %  06/05/20  07:49    


 


Basophils %  0 %  06/05/20  07:49    


 


Neutrophils #  4.9 k/uL (1.3-7.7)   06/05/20  07:49    


 


Lymphocytes #  2.3 k/uL (1.0-4.8)   06/05/20  07:49    


 


Monocytes #  0.5 k/uL (0-1.0)   06/05/20  07:49    


 


Eosinophils #  0.1 k/uL (0-0.7)   06/05/20  07:49    


 


Basophils #  0.0 k/uL (0-0.2)   06/05/20  07:49    


 


Sodium  138 mmol/L (137-145)   06/05/20  07:49    


 


Potassium  3.9 mmol/L (3.5-5.1)   06/05/20  07:49    


 


Chloride  106 mmol/L ()   06/05/20  07:49    


 


Carbon Dioxide  23 mmol/L (22-30)   06/05/20  07:49    


 


Anion Gap  9 mmol/L  06/05/20  07:49    


 


BUN  11 mg/dL (7-17)   06/05/20  07:49    


 


Creatinine  0.55 mg/dL (0.52-1.04)   06/05/20  07:49    


 


Est GFR (CKD-EPI)AfAm  >90  (>60 ml/min/1.73 sqM)   06/05/20  07:49    


 


Est GFR (CKD-EPI)NonAf  >90  (>60 ml/min/1.73 sqM)   06/05/20  07:49    


 


Glucose  110 mg/dL (74-99)  H  06/05/20  07:49    


 


Calcium  9.5 mg/dL (8.4-10.2)   06/05/20  07:49    


 


Total Bilirubin  0.7 mg/dL (0.2-1.3)   06/05/20  07:49    


 


AST  47 U/L (14-36)  H  06/05/20  07:49    


 


ALT  32 U/L (4-34)   06/05/20  07:49    


 


Alkaline Phosphatase  65 U/L ()   06/05/20  07:49    


 


Total Protein  7.4 g/dL (6.3-8.2)   06/05/20  07:49    


 


Albumin  4.1 g/dL (3.5-5.0)   06/05/20  07:49    


 


Urine Color  Light Yellow   06/05/20  00:43    


 


Urine Appearance  Clear  (Clear)   06/05/20  00:43    


 


Urine pH  6.5  (5.0-8.0)   06/05/20  00:43    


 


Ur Specific Gravity  1.004  (1.001-1.035)   06/05/20  00:43    


 


Urine Protein  Negative  (Negative)   06/05/20  00:43    


 


Urine Glucose (UA)  Negative  (Negative)   06/05/20  00:43    


 


Urine Ketones  Negative  (Negative)   06/05/20  00:43    


 


Urine Blood  Small  (Negative)  H  06/05/20  00:43    


 


Urine Nitrite  Negative  (Negative)   06/05/20  00:43    


 


Urine Bilirubin  Negative  (Negative)   06/05/20  00:43    


 


Urine Urobilinogen  <2.0 mg/dL (<2.0)   06/05/20  00:43    


 


Ur Leukocyte Esterase  Negative  (Negative)   06/05/20  00:43    


 


Urine RBC  2 /hpf (0-5)   06/05/20  00:43    


 


Urine WBC  1 /hpf (0-5)   06/05/20  00:43    


 


Ur Squamous Epith Cells  1 /hpf (0-4)   06/05/20  00:43    


 


Urine Bacteria  Moderate /hpf (None)  H  06/05/20  00:43    


 


Urine Mucus  Rare /hpf (None)  H  06/05/20  00:43    


 


Urine Opiates Screen  Not Detected  (NotDetected)   06/05/20  00:43    


 


Ur Oxycodone Screen  Not Detected  (NotDetected)   06/05/20  00:43    


 


Urine Methadone Screen  Not Detected  (NotDetected)   06/05/20  00:43    


 


Ur Propoxyphene Screen  Not Detected  (NotDetected)   06/05/20  00:43    


 


Ur Barbiturates Screen  Not Detected  (NotDetected)   06/05/20  00:43    


 


Valproic Acid  13.5 ug/mL  06/05/20  07:49    


 


U Tricyclic Antidepress  Not Detected  (NotDetected)   06/05/20  00:43    


 


Ur Phencyclidine Scrn  Not Detected  (NotDetected)   06/05/20  00:43    


 


Ur Amphetamines Screen  Not Detected  (NotDetected)   06/05/20  00:43    


 


U Methamphetamines Scrn  Not Detected  (NotDetected)   06/05/20  00:43    


 


U Benzodiazepines Scrn  Detected  (NotDetected)  H  06/05/20  00:43    


 


Urine Cocaine Screen  Not Detected  (NotDetected)   06/05/20  00:43    


 


U Marijuana (THC) Screen  Not Detected  (NotDetected)   06/05/20  00:43    











06/05/20 11:53





06/05/20 12:36





06/05/20 12:42








Assessment and Plan


Assessment: 





IMPRESSIONS: 


Schizophrenia


Nicotine dependence








Plan: 


PLAN: 


-Patient is admitted to the [facility] for stabilization of psychiatric symptoms and 

safety.  Patient signed for medications consent and was placed in chart.


-Medications : Will continue Invega Sustenna 234 mg IM every 4 weeks the patient

is due for the next injection on 06/26/2020.  


Will start melatonin 6 mg daily at bedtime for sleep.  


Continue Cogentin to 0.5 mg twice a day for EPS prophylaxis.





-Ativan and Geodon PRN for agitation/aggression


-Patient was informed of the risks, benefits and side effects of the medication 

and patient verbally consented to taking the medications. Patient signed med 

consent form and was placed in chart.


-Internal Medicine consult to perform medical evaluation and physical.


-NRT -nicotine gum.


-SW on board for discharge planning. Encourage patient to participate in groups 

to work on coping skills.

## 2020-06-06 RX ADMIN — Medication SCH MG: at 20:48

## 2020-06-06 RX ADMIN — BENZTROPINE MESYLATE SCH MG: 0.5 TABLET ORAL at 20:48

## 2020-06-06 RX ADMIN — BENZTROPINE MESYLATE SCH MG: 0.5 TABLET ORAL at 09:02

## 2020-06-06 RX ADMIN — ATORVASTATIN CALCIUM SCH MG: 10 TABLET, FILM COATED ORAL at 09:02

## 2020-06-06 RX ADMIN — ACETAMINOPHEN PRN MG: 500 TABLET ORAL at 13:12

## 2020-06-06 RX ADMIN — DIVALPROEX SODIUM SCH MG: 500 TABLET, FILM COATED, EXTENDED RELEASE ORAL at 20:48

## 2020-06-06 RX ADMIN — NICOTINE POLACRILEX PRN MG: 2 GUM, CHEWING BUCCAL at 20:48

## 2020-06-06 RX ADMIN — NICOTINE POLACRILEX PRN MG: 2 GUM, CHEWING BUCCAL at 13:12

## 2020-06-06 RX ADMIN — ACETAMINOPHEN PRN MG: 500 TABLET ORAL at 01:39

## 2020-06-06 RX ADMIN — NICOTINE POLACRILEX PRN MG: 2 GUM, CHEWING BUCCAL at 09:02

## 2020-06-06 RX ADMIN — LEVOTHYROXINE SODIUM SCH MCG: 50 TABLET ORAL at 06:22

## 2020-06-06 RX ADMIN — NICOTINE POLACRILEX PRN MG: 2 GUM, CHEWING BUCCAL at 01:39

## 2020-06-06 RX ADMIN — ATENOLOL SCH MG: 50 TABLET ORAL at 09:03

## 2020-06-06 RX ADMIN — NICOTINE POLACRILEX PRN MG: 2 GUM, CHEWING BUCCAL at 16:29

## 2020-06-06 NOTE — P.PN
Progress Note - Text


Progress Note Date: 06/06/20


Clinical Problems: Schizophrenia, tobacco use disorder





Interim history:.  The medical record and interviewed the patient.  She is a 

64-year-old  female readmitted to the psychiatric unit 2 days after 

discharge.  On admission, she reported that she has not been taking her 

prescribed medications, not feeling safe at home, was not sleeping and expressed

paranoid ideation.





I found her standing facing the wall at the end of the [place] staring at

the clock.  She stated that she was waiting for the clock to reach 24.  She 

believes that once the clock reaches "24" that there will be judgment.  "People 

will get on her knees for the Lord."





She has been attending therapeutic groups and activities.  She'll he slept 3 

hours last night.





Mental status exam: She presented as a short obese elderly woman with long 

unkempt hair.  Earlier today, her affect was bright.  When she was standing 

facing the clock she was distressed and appeared frightened.  Her speech was 

spontaneous, halting but normal volume.  Her affect was paranoid and distressed.

 She did not express clear suicidal ideation, death wishes or homicidal 

ideation.  She is experiencing ideas reference, paranoid ideation and paranoid 

delusional beliefs.  Her thinking is concrete and associations were coherent, 

organized and goal directed.  She did not appear to responding to internal 

stimuli.





Assessment: She has intermittent periods of marked paranoia and worsening 

paranoid delusional beliefs.  She was intermittently restless but has been 

compliant with prescribed medications.





Plan: Inpatient treatment.  Continue safety precautions.  Continue Invega 234 mg

IM monthly (to be given 06/26/2020), Depakote  mg at bedtime.  Begin a 

trial of Seroquel 50 mg at bedtime for sleep and psychosis and titrated 

according to clinical response and tolerance.  Evaluate clinical status response

to treatment daily basis.  Encourage continued participation in therapeutic 

groups and activities.

## 2020-06-07 RX ADMIN — BENZTROPINE MESYLATE SCH MG: 0.5 TABLET ORAL at 09:05

## 2020-06-07 RX ADMIN — BENZTROPINE MESYLATE SCH MG: 0.5 TABLET ORAL at 21:12

## 2020-06-07 RX ADMIN — ACETAMINOPHEN PRN MG: 500 TABLET ORAL at 21:14

## 2020-06-07 RX ADMIN — Medication SCH MG: at 21:12

## 2020-06-07 RX ADMIN — ATENOLOL SCH MG: 50 TABLET ORAL at 09:05

## 2020-06-07 RX ADMIN — ACETAMINOPHEN PRN MG: 500 TABLET ORAL at 01:57

## 2020-06-07 RX ADMIN — DIVALPROEX SODIUM SCH MG: 500 TABLET, FILM COATED, EXTENDED RELEASE ORAL at 21:12

## 2020-06-07 RX ADMIN — NICOTINE POLACRILEX PRN MG: 2 GUM, CHEWING BUCCAL at 13:37

## 2020-06-07 RX ADMIN — LEVOTHYROXINE SODIUM SCH MCG: 50 TABLET ORAL at 09:04

## 2020-06-07 RX ADMIN — NICOTINE POLACRILEX PRN MG: 2 GUM, CHEWING BUCCAL at 09:06

## 2020-06-07 RX ADMIN — ATORVASTATIN CALCIUM SCH MG: 10 TABLET, FILM COATED ORAL at 09:04

## 2020-06-07 RX ADMIN — NICOTINE POLACRILEX PRN MG: 2 GUM, CHEWING BUCCAL at 18:49

## 2020-06-07 NOTE — P.PN
Progress Note - Text


Progress Note Date: 06/07/20





Clinical Problems: Schizophrenia, tobacco use disorder





Interim history: I reviewed the medical record and interviewed the patient.  She

denied concerns and offered no complaints.  She is religiously preoccupied but 

did not expressed desire or overvalued Mu-ism thoughts or beliefs.





Mental status exam: She presented as a short obese elderly woman with long hair.

 She had a bright almost elated facial expression.   Her speech was spontaneous,

halting but normal volume.  Her affect was elevated.  She did not express clear 

suicidal ideation, death wishes or homicidal ideation.  She is experiencing 

ideas reference, paranoid ideation and paranoid delusional beliefs.  Her 

thinking is concrete and associations were coherent, organized and goal 

directed.  She did not appear to responding to internal stimuli.





Assessment: She has intermittent periods of marked paranoia and worsening parano

id delusional beliefs.  She was intermittently restless but has been compliant 

with prescribed medications.





Plan: Inpatient treatment.  Continue safety precautions.  Continue Invega 234 mg

IM monthly (to be given 06/26/2020), Depakote  mg at bedtime.  Begin a 

trial of Seroquel 50 mg at bedtime for sleep and psychosis and titrated 

according to clinical response and tolerance.  Evaluate clinical status response

to treatment daily basis.  Encourage continued participation in therapeutic 

groups and activities.

## 2020-06-08 RX ADMIN — ACETAMINOPHEN PRN MG: 500 TABLET ORAL at 11:22

## 2020-06-08 RX ADMIN — ACETAMINOPHEN PRN MG: 500 TABLET ORAL at 03:24

## 2020-06-08 RX ADMIN — BENZTROPINE MESYLATE SCH MG: 0.5 TABLET ORAL at 21:04

## 2020-06-08 RX ADMIN — ATORVASTATIN CALCIUM SCH MG: 10 TABLET, FILM COATED ORAL at 08:34

## 2020-06-08 RX ADMIN — Medication SCH MG: at 21:04

## 2020-06-08 RX ADMIN — ACETAMINOPHEN PRN MG: 500 TABLET ORAL at 18:46

## 2020-06-08 RX ADMIN — ATENOLOL SCH MG: 50 TABLET ORAL at 08:34

## 2020-06-08 RX ADMIN — MAGNESIUM HYDROXIDE PRN MG: 2400 SUSPENSION ORAL at 21:05

## 2020-06-08 RX ADMIN — NICOTINE POLACRILEX PRN MG: 2 GUM, CHEWING BUCCAL at 18:45

## 2020-06-08 RX ADMIN — DIVALPROEX SODIUM SCH MG: 500 TABLET, FILM COATED, EXTENDED RELEASE ORAL at 21:04

## 2020-06-08 RX ADMIN — NICOTINE POLACRILEX PRN MG: 2 GUM, CHEWING BUCCAL at 13:45

## 2020-06-08 RX ADMIN — BENZTROPINE MESYLATE SCH MG: 0.5 TABLET ORAL at 08:34

## 2020-06-08 RX ADMIN — NICOTINE POLACRILEX PRN MG: 2 GUM, CHEWING BUCCAL at 08:34

## 2020-06-08 RX ADMIN — LEVOTHYROXINE SODIUM SCH MCG: 50 TABLET ORAL at 06:01

## 2020-06-08 NOTE — P.PN
Progress Note - Text


Progress Note Date: 06/08/20





Interval History:


Patient was seen wandering the hallways and was directable and agreeable to sp

philly with writer in the office.  Patient appeared to be superficially cooperative

with writer and explained the reasons why she came to the hospital.  Patient 

states that she was too "overwhelmed at home" it spoke about "gangs circling my 

neighborhood".  She states that she did not take her medications when she left 

the hospital.  She states that she understands she needs a guardian and claims 

that her sister can be guardian for her.  She states that her mood is "fine" and

claims that she has been going to groups.  She denies any depressive symptoms at

this time.  Patient was not religiously preoccupied and did not endorse any 

delusions today.  He states that she slept well last night.  At this time 

patient denies any suicidal or homical ideations, intent or plan. Patient denies

any auditory, visual hallucinations. Patient denies any side effects from the 

medications and has been compliant with meds. 





Mental Status Exam:


General Appearance: Patient appears to be overweight, stated age is alert, 

directable, and attempts to cooperate. Patient appears to have poor hygiene and 

grooming.


Behavior: Patient is seated without any agitated behavior.  Attempts to 

cooperate.


Speech: Patient's speech is fluent and nonpressured.


Mood/Affect: Patient reports their mood is "ok", affect is congruent and 

constricted.


Suicidality/Homicidality:  Patient denies having any homicidal ideation intent 

or plan. Denies any suicidal ideations intent or plan  


Perceptions: Patient denies any visual hallucinations and denies any auditory 

hallucinations


Though content/process: Goal oriented, logical.  Poor reality testing and 

insight.


Memory and concentration: AOX3, grossly intact for the purposes of this session


Judgment and insight: Poor.





Assessment


Schizophrenia


Nicotine dependence.





Plan:


-Patient continues to meet criteria for inpatient psychiatric admission for 

symptom stabilization and safety.  Patient is currently on active treatment 

order.


-Medications: Patient will be due for her next Invega Sustenna injection on 

6/26/2020 of 234mg IM every monthly.  Continue with Depakote 500 mg daily at 

bedtime for mood stabilization, Cogentin 0.5 mg twice a day for EPS prophylaxis,

Seroquel 50 mg daily at bedtime for mood stabilization/insomnia, melatonin 6 mg 

nightly for sleep.


-When necessary Ativan and Geodon for agitation/aggression.


-NRT - nicotine patch


-SW on board for discharge planning.  Encouraged the patient to participate in 

milieu.  Due to patient's poor insight and inability to care for herself, 

patient cannot live on her own and will need placement prior to discharge from 

the hospital.   to arrange for either public guardian versus 

patient's sister as guardian.

## 2020-06-09 RX ADMIN — NICOTINE POLACRILEX PRN MG: 2 GUM, CHEWING BUCCAL at 07:01

## 2020-06-09 RX ADMIN — ACETAMINOPHEN PRN MG: 500 TABLET ORAL at 02:25

## 2020-06-09 RX ADMIN — ATENOLOL SCH MG: 50 TABLET ORAL at 08:40

## 2020-06-09 RX ADMIN — BENZTROPINE MESYLATE SCH MG: 0.5 TABLET ORAL at 08:40

## 2020-06-09 RX ADMIN — ACETAMINOPHEN PRN MG: 500 TABLET ORAL at 18:01

## 2020-06-09 RX ADMIN — ATORVASTATIN CALCIUM SCH MG: 10 TABLET, FILM COATED ORAL at 08:40

## 2020-06-09 RX ADMIN — LEVOTHYROXINE SODIUM SCH MCG: 50 TABLET ORAL at 04:04

## 2020-06-09 RX ADMIN — BENZTROPINE MESYLATE SCH MG: 0.5 TABLET ORAL at 20:48

## 2020-06-09 RX ADMIN — NICOTINE POLACRILEX PRN MG: 2 GUM, CHEWING BUCCAL at 13:47

## 2020-06-09 RX ADMIN — NICOTINE POLACRILEX PRN MG: 2 GUM, CHEWING BUCCAL at 19:20

## 2020-06-09 RX ADMIN — Medication SCH MG: at 20:48

## 2020-06-09 RX ADMIN — NICOTINE POLACRILEX PRN MG: 2 GUM, CHEWING BUCCAL at 10:26

## 2020-06-09 RX ADMIN — ACETAMINOPHEN PRN MG: 500 TABLET ORAL at 10:26

## 2020-06-09 RX ADMIN — DIVALPROEX SODIUM SCH MG: 500 TABLET, FILM COATED, EXTENDED RELEASE ORAL at 20:48

## 2020-06-09 NOTE — P.PN
Progress Note - Text


Progress Note Date: 06/09/20





Interval History:


Patient was seen wandering the hallways and was directable and agreeable to sp

eak with writer in the office.  Patient appeared to be superficially cooperative

with writer today.  She was rocking back and forth in the chair and states that 

"I always do this".  She continues to be compliant with treatment and claims 

that she is attending groups and apparently enjoys them.  She has been noted to 

be visible on the unit and speaking to other patients. She continues to speak 

about her sister coming up from [place] who has volunteered to be her 

guardian and states that she has a court hearing on Thursday.  She states that 

her mood is "fine" and denies any anxiety at this time.  She denies any 

depressive symptoms at this time.  Patient was not religiously preoccupied and 

did not endorse any delusions today.  He states that she slept well last night. 

At this time patient denies any suicidal or homical ideations, intent or plan. 

Patient denies any auditory, visual hallucinations. Patient denies any side 

effects from the medications and has been compliant with meds. 





Mental Status Exam:


General Appearance: Patient appears to be overweight, stated age is alert, 

directable, and attempts to cooperate. Patient appears to have improving hygiene

and grooming.


Behavior: Patient is seated without any agitated behavior.  Attempts to 

cooperate.


Speech: Patient's speech is fluent and nonpressured.


Mood/Affect: Patient reports their mood is "fine", affect is congruent and 

constricted.


Suicidality/Homicidality:  Patient denies having any homicidal ideation intent 

or plan. Denies any suicidal ideations intent or plan  


Perceptions: Patient denies any visual hallucinations and denies any auditory 

hallucinations


Though content/process: Goal oriented, logical.  Poor reality testing and 

insight.


Memory and concentration: AOX3, grossly intact for the purposes of this session


Judgment and insight: Poor, only improving.





Assessment


Schizophrenia


Nicotine dependence.





Plan:


-Patient continues to meet criteria for inpatient psychiatric admission for 

symptom stabilization and safety.  Patient is currently on active treatment 

order.


-Medications: Patient will be due for her next Invega Sustenna injection on 

6/26/2020 of 234mg IM every monthly.  Continue with Depakote 500 mg daily at 

bedtime for mood stabilization, Cogentin 0.5 mg twice a day for EPS prophylaxis,

Seroquel 50 mg daily at bedtime for mood stabilization/insomnia, melatonin 6 mg 

nightly for sleep.


-When necessary Ativan and Geodon for agitation/aggression.


-NRT - nicotine patch


-SW on board for discharge planning.  Encouraged the patient to participate in 

milieu.  Due to patient's poor insight and inability to care for herself, 

patient cannot live on her own and will need placement prior to discharge from 

the hospital.  Continuing to await for a guardian to be appointed and patient's 

placement.

## 2020-06-10 RX ADMIN — ACETAMINOPHEN PRN MG: 500 TABLET ORAL at 16:19

## 2020-06-10 RX ADMIN — BENZTROPINE MESYLATE SCH MG: 0.5 TABLET ORAL at 19:49

## 2020-06-10 RX ADMIN — GABAPENTIN SCH MG: 300 CAPSULE ORAL at 16:18

## 2020-06-10 RX ADMIN — NICOTINE POLACRILEX PRN MG: 2 GUM, CHEWING BUCCAL at 14:26

## 2020-06-10 RX ADMIN — Medication SCH MG: at 19:49

## 2020-06-10 RX ADMIN — ACETAMINOPHEN PRN MG: 500 TABLET ORAL at 09:37

## 2020-06-10 RX ADMIN — LEVOTHYROXINE SODIUM SCH MCG: 50 TABLET ORAL at 05:50

## 2020-06-10 RX ADMIN — ACETAMINOPHEN PRN MG: 500 TABLET ORAL at 02:11

## 2020-06-10 RX ADMIN — NICOTINE POLACRILEX PRN MG: 2 GUM, CHEWING BUCCAL at 02:13

## 2020-06-10 RX ADMIN — ATENOLOL SCH MG: 50 TABLET ORAL at 09:36

## 2020-06-10 RX ADMIN — GABAPENTIN SCH MG: 300 CAPSULE ORAL at 09:39

## 2020-06-10 RX ADMIN — ATORVASTATIN CALCIUM SCH MG: 10 TABLET, FILM COATED ORAL at 09:35

## 2020-06-10 RX ADMIN — BENZTROPINE MESYLATE SCH MG: 0.5 TABLET ORAL at 09:37

## 2020-06-10 RX ADMIN — GABAPENTIN SCH MG: 300 CAPSULE ORAL at 19:52

## 2020-06-10 RX ADMIN — NICOTINE POLACRILEX PRN MG: 2 GUM, CHEWING BUCCAL at 09:37

## 2020-06-10 RX ADMIN — DIVALPROEX SODIUM SCH MG: 500 TABLET, FILM COATED, EXTENDED RELEASE ORAL at 19:49

## 2020-06-10 NOTE — P.PN
Progress Note - Text


Progress Note Date: 06/10/20





Interval History:


Patient was seen wandering the hallways and was directable and agreeable to michaela fraga with writer in the office.  Patient appeared to be rocking back and forth 

this morning in her chair and states that she is in severe pain in her hips.  

She claims that the Tylenol as been helping however she has previously been on 

gabapentin which has helped her and was requesting to be placed on it.  She 

claims that she is able to sleep through the night and offered no other 

complaints.  Patient was almost in tears due to her pain.  She claims that her 

mood has been gradually improving and denies any anxiety at this time.  She is 

not endorsing any Jehovah's witness preoccupations today or any delusions.  She states 

that she has been going to groups and participating as best as she can.  At this

time patient denies any suicidal or homical ideations, intent or plan. Patient 

denies any auditory, visual hallucinations. Patient denies any side effects from

the medications and has been compliant with meds. 





Mental Status Exam:


General Appearance: Patient appears to be overweight, stated age is alert, 

directable, and attempts to cooperate. Patient appears to have improving hygiene

and grooming.  Rocking back and forth.


Behavior: Patient is seated without any agitated behavior. Attempts to c

ooperate.


Speech: Patient's speech is fluent and nonpressured.


Mood/Affect: Patient reports their mood is "ok", affect is congruent and 

constricted.


Suicidality/Homicidality:  Patient denies having any homicidal ideation intent 

or plan. Denies any suicidal ideations intent or plan  


Perceptions: Patient denies any visual hallucinations and denies any auditory 

hallucinations


Though content/process: Goal oriented, logical.  Poor reality testing


Memory and concentration: AOX3, grossly intact for the purposes of this session


Judgment and insight: Improving moderately.





Assessment


Schizophrenia


Nicotine dependence.





Plan:


-Patient continues to meet criteria for inpatient psychiatric admission for 

symptom stabilization and safety.  Patient is currently on active treatment 

order.


-Medications: Patient will be due for her next Invega Sustenna injection on 

6/26/2020 of 234mg IM every monthly.  Continue with Depakote 500 mg daily at 

bedtime for mood stabilization, Cogentin 0.5 mg twice a day for EPS prophylaxis,

Seroquel 50 mg daily at bedtime for mood stabilization/insomnia, melatonin 6 mg 

nightly for sleep.  Added gabapentin 300 mg 3 times a day for neuropathic pain.


-When necessary Ativan and Geodon for agitation/aggression.


-NRT - nicotine patch


-SW on board for discharge planning.  Encouraged the patient to participate in 

milieu.  Due to patient's poor insight and inability to care for herself, 

patient cannot live on her own and will need placement prior to discharge from 

the hospital.  Continuing to await for a guardian to be appointed and patient's 

placement.

## 2020-06-11 RX ADMIN — DIVALPROEX SODIUM SCH MG: 500 TABLET, DELAYED RELEASE ORAL at 20:00

## 2020-06-11 RX ADMIN — LEVOTHYROXINE SODIUM SCH MCG: 50 TABLET ORAL at 05:41

## 2020-06-11 RX ADMIN — ACETAMINOPHEN PRN MG: 500 TABLET ORAL at 06:28

## 2020-06-11 RX ADMIN — NICOTINE POLACRILEX PRN MG: 2 GUM, CHEWING BUCCAL at 09:21

## 2020-06-11 RX ADMIN — DIVALPROEX SODIUM SCH MG: 250 TABLET, FILM COATED, EXTENDED RELEASE ORAL at 09:21

## 2020-06-11 RX ADMIN — Medication SCH MG: at 20:00

## 2020-06-11 RX ADMIN — BENZTROPINE MESYLATE SCH MG: 0.5 TABLET ORAL at 20:00

## 2020-06-11 RX ADMIN — ACETAMINOPHEN PRN MG: 500 TABLET ORAL at 13:47

## 2020-06-11 RX ADMIN — GABAPENTIN SCH MG: 400 CAPSULE ORAL at 15:31

## 2020-06-11 RX ADMIN — GABAPENTIN SCH MG: 400 CAPSULE ORAL at 20:00

## 2020-06-11 RX ADMIN — GABAPENTIN SCH MG: 400 CAPSULE ORAL at 09:21

## 2020-06-11 RX ADMIN — BENZTROPINE MESYLATE SCH MG: 0.5 TABLET ORAL at 09:21

## 2020-06-11 RX ADMIN — ATORVASTATIN CALCIUM SCH MG: 10 TABLET, FILM COATED ORAL at 09:21

## 2020-06-11 RX ADMIN — ATENOLOL SCH MG: 50 TABLET ORAL at 09:20

## 2020-06-11 NOTE — P.PN
Progress Note - Text


Progress Note Date: 06/11/20





Interval History:


Patient was seen sitting in the TV lounge as morning and was directable and ag

reeable to speak with writer in the office.  Patient appeared to be rocking back

and forth this morning once again in her chair and continues to state that she 

has pain in her hips however did state that the Neurontin is helping her.  

Patient did request to have an increase in her Neurontin.  She claims that she 

is feeling sad today because of her mother who states that she lives alone and 

has dementia.  Patient was very thankful today for her care and thanked writer 

several times.  She states that she is getting along with others on the unit and

trying to stay active and go to groups. She claims that her mood has been 

gradually improving and denies any anxiety at this time.  She is not endorsing 

any Amish preoccupations today or any delusions.  She states that she was 

able to sleep well last night.  At this time patient denies any suicidal or 

homical ideations, intent or plan. Patient denies any auditory, visual 

hallucinations. Patient denies any side effects from the medications and has 

been compliant with meds. 





Mental Status Exam:


General Appearance: Patient appears to be overweight, stated age is alert, 

directable, and attempts to cooperate. Patient appears to have improving hygiene

and grooming.  Rocking back and forth.


Behavior: Patient is seated without any agitated behavior. Attempts to 

cooperate.


Speech: Patient's speech is fluent and nonpressured.


Mood/Affect: Patient reports their mood is "good", affect is congruent and 

tearful at times.


Suicidality/Homicidality:  Patient denies having any homicidal ideation intent 

or plan. Denies any suicidal ideations intent or plan  


Perceptions: Patient denies any visual hallucinations and denies any auditory 

hallucinations


Though content/process: Goal oriented, logical.  Poor reality testing


Memory and concentration: AOX3, grossly intact for the purposes of this session


Judgment and insight: Improving mildly





Assessment


Schizophrenia


Nicotine dependence.





Plan:


-Patient continues to meet criteria for inpatient psychiatric admission for 

symptom stabilization and safety.  Patient is currently on active treatment 

order.


-Medications: Patient will be due for her next Invega Sustenna injection on 

6/26/2020 of 234mg IM every monthly.  Switched Depakote 250 mg BID for mood 

stabilization, Cogentin 0.5 mg twice a day for EPS prophylaxis, Seroquel 50 mg 

daily at bedtime for mood stabilization/insomnia, melatonin 6 mg nightly for 

sleep.  Increased gabapentin 400 mg 3 times a day for neuropathic pain.


-When necessary Ativan and Geodon for agitation/aggression.


-NRT - nicotine patch


-SW on board for discharge planning.  Encouraged the patient to participate in 

milieu.  Due to patient's poor insight and inability to care for herself, 

patient cannot live on her own and will need placement prior to discharge from 

the hospital.  Continuing to await for a guardian to be appointed and patient's 

placement.

## 2020-06-12 RX ADMIN — ACETAMINOPHEN PRN MG: 500 TABLET ORAL at 04:46

## 2020-06-12 RX ADMIN — DIVALPROEX SODIUM SCH MG: 250 TABLET, FILM COATED, EXTENDED RELEASE ORAL at 09:10

## 2020-06-12 RX ADMIN — ATORVASTATIN CALCIUM SCH MG: 10 TABLET, FILM COATED ORAL at 09:10

## 2020-06-12 RX ADMIN — Medication SCH MG: at 20:20

## 2020-06-12 RX ADMIN — ATENOLOL SCH MG: 50 TABLET ORAL at 09:10

## 2020-06-12 RX ADMIN — BENZTROPINE MESYLATE SCH MG: 0.5 TABLET ORAL at 09:10

## 2020-06-12 RX ADMIN — GABAPENTIN SCH MG: 400 CAPSULE ORAL at 09:10

## 2020-06-12 RX ADMIN — NICOTINE POLACRILEX PRN MG: 2 GUM, CHEWING BUCCAL at 14:58

## 2020-06-12 RX ADMIN — BENZTROPINE MESYLATE SCH MG: 0.5 TABLET ORAL at 20:20

## 2020-06-12 RX ADMIN — ACETAMINOPHEN PRN MG: 500 TABLET ORAL at 12:55

## 2020-06-12 RX ADMIN — GABAPENTIN SCH MG: 400 CAPSULE ORAL at 21:22

## 2020-06-12 RX ADMIN — ACETAMINOPHEN PRN MG: 500 TABLET ORAL at 20:18

## 2020-06-12 RX ADMIN — DIVALPROEX SODIUM SCH MG: 500 TABLET, DELAYED RELEASE ORAL at 20:20

## 2020-06-12 RX ADMIN — LEVOTHYROXINE SODIUM SCH MCG: 50 TABLET ORAL at 04:47

## 2020-06-12 RX ADMIN — GABAPENTIN SCH MG: 400 CAPSULE ORAL at 16:09

## 2020-06-12 NOTE — P.PN
Progress Note - Text


Progress Note Date: 06/12/20





Interval History:


Patient was seen sitting in on group this morning and was directable and agree

able to speak with writer in the office.  Patient appeared to be calmer today 

and have a brighter affect and was appropriate and polite with writer.  She was 

very thankful for her treatment, thinking writer several times.  She states that

she is feeling better today.  She also claims that the increase in the Neurontin

has been helping her pain.  She claims that she is trying to go to groups and pa

rticipate as best as she can.  She claims that her mood has been gradually 

improving and denies any anxiety at this time.  Patient was agreeable to go back

to her home after discharge and also agreeable to have the ACT follow her daily.

 She is not endorsing any Yazdanism preoccupations today or any delusions.  She 

states that she was able to sleep well last night.  At this time patient denies 

any suicidal or homical ideations, intent or plan. Patient denies any auditory, 

visual hallucinations. Patient denies any side effects from the medications and 

has been compliant with meds. 





Mental Status Exam:


General Appearance: Patient appears to be overweight, stated age is alert, 

polite, and attempts to cooperate. Patient appears to have improving hygiene and

grooming.


Behavior: Patient is seated without any agitated behavior. Attempts to 

cooperate.


Speech: Patient's speech is fluent and nonpressured.


Mood/Affect: Patient reports their mood is "better", affect is congruent


Suicidality/Homicidality:  Patient denies having any homicidal ideation intent 

or plan. Denies any suicidal ideations intent or plan  


Perceptions: Patient denies any visual hallucinations and denies any auditory 

hallucinations


Though content/process: Goal oriented, logical.  [place].


Memory and concentration: AOX3, grossly intact for the purposes of this session


Judgment and insight: Improving mildly





Assessment


Schizophrenia


Nicotine dependence.





Plan:


-Patient continues to meet criteria for inpatient psychiatric admission for 

symptom stabilization and safety.  Patient is currently on active treatment 

order.


-Medications: Patient will be due for her next Invega Sustenna injection on 

6/26/2020 of 234mg IM every monthly.  Increased Depakote 500 mg BID for mood 

stabilization, Cogentin 0.5 mg twice a day for EPS prophylaxis, Seroquel 50 mg 

daily at bedtime for mood stabilization/insomnia, melatonin 6 mg nightly for 

sleep.  Continue with gabapentin 400 mg 3 times a day for neuropathic pain. 


-When necessary Ativan and Geodon for agitation/aggression.


-NRT - nicotine patch


-SW on board for discharge planning.  Encouraged the patient to participate in 

milieu.  Due to patient's poor insight and inability to care for herself.  After

guardianship hearing on 6/11/2020, patient now has a public guardian and her 

sister as guardians.  Patient will likely be discharged Monday back to her home 

with daily ACT team visits until [employer] can find her an [facility] for permanent 

placement.

## 2020-06-13 RX ADMIN — GABAPENTIN SCH MG: 400 CAPSULE ORAL at 21:00

## 2020-06-13 RX ADMIN — BENZTROPINE MESYLATE SCH MG: 0.5 TABLET ORAL at 21:00

## 2020-06-13 RX ADMIN — DIVALPROEX SODIUM SCH MG: 500 TABLET, FILM COATED, EXTENDED RELEASE ORAL at 08:44

## 2020-06-13 RX ADMIN — ATENOLOL SCH MG: 50 TABLET ORAL at 08:42

## 2020-06-13 RX ADMIN — ACETAMINOPHEN PRN MG: 500 TABLET ORAL at 11:37

## 2020-06-13 RX ADMIN — BENZTROPINE MESYLATE SCH MG: 0.5 TABLET ORAL at 08:42

## 2020-06-13 RX ADMIN — GABAPENTIN SCH MG: 400 CAPSULE ORAL at 08:42

## 2020-06-13 RX ADMIN — ACETAMINOPHEN PRN MG: 500 TABLET ORAL at 04:28

## 2020-06-13 RX ADMIN — DIVALPROEX SODIUM SCH MG: 500 TABLET, DELAYED RELEASE ORAL at 21:00

## 2020-06-13 RX ADMIN — ACETAMINOPHEN PRN MG: 500 TABLET ORAL at 23:26

## 2020-06-13 RX ADMIN — LEVOTHYROXINE SODIUM SCH MCG: 50 TABLET ORAL at 06:21

## 2020-06-13 RX ADMIN — ATORVASTATIN CALCIUM SCH MG: 10 TABLET, FILM COATED ORAL at 08:42

## 2020-06-13 RX ADMIN — Medication SCH MG: at 21:00

## 2020-06-13 RX ADMIN — GABAPENTIN SCH MG: 400 CAPSULE ORAL at 16:31

## 2020-06-13 NOTE — P.PN
Progress Note - Text


Progress Note Date: 06/13/20





Interval History:


Patient was seen after taking her afternoon medications at the nurse's window 

and was directable and agreeable to speak to writer.  Patient continues to be 

appropriate and polite and gave writer several different comments today.  She 

states that she is continuing to be in chronic pain however states that the 

gabapentin is helping her.  She asked about not being on the long-acting 

injection any longer as she believes it is causing her pain in her hip however 

patient was agreeable to take it in her arm next time.  She is not endorsing any

Denominational preoccupations today or any delusions.  She states that she was able 

to sleep well last night.  At this time patient denies any suicidal or homical 

ideations, intent or plan. Patient denies any auditory, visual hallucinations. 

Patient denies any side effects from the medications and has been compliant with

meds. 





Mental Status Exam:


General Appearance: Patient appears to be overweight, stated age is alert, 

polite, and attempts to cooperate. Patient appears to have improving hygiene and

grooming.


Behavior: Patient is seated without any agitated behavior. Attempts to 

cooperate.


Speech: Patient's speech is fluent and nonpressured.


Mood/Affect: Patient reports their mood is "good", affect is congruent


Suicidality/Homicidality:  Patient denies having any homicidal ideation intent 

or plan. Denies any suicidal ideations intent or plan  


Perceptions: Patient denies any visual hallucinations and denies any auditory 

hallucinations


Though content/process: Goal oriented, logical.  [place].


Memory and concentration: AOX3, grossly intact for the purposes of this session


Judgment and insight: Improving mildly





Assessment


Schizophrenia


Nicotine dependence.





Plan:


-Patient continues to meet criteria for inpatient psychiatric admission for 

symptom stabilization and safety.  Patient is currently on active treatment 

order.


-Medications: Patient will be due for her next Invega Sustenna injection on 

6/26/2020 of 234mg IM every monthly.  Continue with Depakote 500 mg BID for mood

stabilization, Cogentin 0.5 mg twice a day for EPS prophylaxis, Seroquel 50 mg 

daily at bedtime for mood stabilization/insomnia, melatonin 6 mg nightly for 

sleep.  Continue with gabapentin 400 mg 3 times a day for neuropathic pain. 


-When necessary Ativan and Geodon for agitation/aggression.


-NRT - nicotine patch


-SW on board for discharge planning.  Encouraged the patient to participate in 

milieu.  Patient has poor insight and inability to care for herself.  After 

guardianship hearing on 6/11/2020, patient now has a public guardian and her 

sister as guardians.  Patient will likely be discharged Monday back to her home 

with daily ACT team visits until [facility] can find her an [facility] for permanent 

placement.

## 2020-06-14 RX ADMIN — LEVOTHYROXINE SODIUM SCH MCG: 50 TABLET ORAL at 06:24

## 2020-06-14 RX ADMIN — GABAPENTIN SCH MG: 300 CAPSULE ORAL at 17:07

## 2020-06-14 RX ADMIN — ACETAMINOPHEN PRN MG: 500 TABLET ORAL at 06:24

## 2020-06-14 RX ADMIN — BENZTROPINE MESYLATE SCH MG: 0.5 TABLET ORAL at 09:04

## 2020-06-14 RX ADMIN — ATORVASTATIN CALCIUM SCH MG: 10 TABLET, FILM COATED ORAL at 09:04

## 2020-06-14 RX ADMIN — Medication SCH MG: at 21:29

## 2020-06-14 RX ADMIN — GABAPENTIN SCH MG: 400 CAPSULE ORAL at 09:04

## 2020-06-14 RX ADMIN — GABAPENTIN SCH MG: 300 CAPSULE ORAL at 21:29

## 2020-06-14 RX ADMIN — DIVALPROEX SODIUM SCH MG: 500 TABLET, DELAYED RELEASE ORAL at 21:29

## 2020-06-14 RX ADMIN — BENZTROPINE MESYLATE SCH MG: 0.5 TABLET ORAL at 21:29

## 2020-06-14 RX ADMIN — ATENOLOL SCH MG: 50 TABLET ORAL at 09:04

## 2020-06-14 RX ADMIN — DIVALPROEX SODIUM SCH MG: 500 TABLET, FILM COATED, EXTENDED RELEASE ORAL at 09:04

## 2020-06-14 RX ADMIN — ACETAMINOPHEN PRN MG: 500 TABLET ORAL at 17:07

## 2020-06-14 NOTE — P.PN
Progress Note - Text


Progress Note Date: 06/14/20





Interval History:


Patient was seen taking part in group this morning and was directable and agre

eable to speak to writer.  Patient continues to be appropriate and polite 

however did appear to be in moderate distress today secondary to her pain.  She 

claims that the gabapentin is helping however requested to have it increased 

once again.  She offered no other complaints.  She states that she is taking her

medications every day as prescribed.  She claims that she is looking forward to 

be discharged tomorrow.  She states that she is going to groups and turn to 

participate as best as she can.  He claims her mood as been gradually improving.

  She is not endorsing any Rastafarian preoccupations today or any delusions.  She

states that she was able to sleep well last night.  At this time patient denies 

any suicidal or homical ideations, intent or plan. Patient denies any auditory, 

visual hallucinations. Patient denies any side effects from the medications and 

has been compliant with meds. 





Mental Status Exam:


General Appearance: Patient appears to be overweight, stated age is alert, [place]

ite, and attempts to cooperate. Patient appears to have improving hygiene and 

grooming.


Behavior: Patient is seated without any agitated behavior. Attempts to 

cooperate.


Speech: Patient's speech is fluent and nonpressured.


Mood/Affect: Patient reports their mood is "ok", affect is congruent


Suicidality/Homicidality:  Patient denies having any homicidal ideation intent 

or plan. Denies any suicidal ideations intent or plan  


Perceptions: Patient denies any visual hallucinations and denies any auditory 

hallucinations


Though content/process: Goal oriented, logical.  [place].


Memory and concentration: AOX3, grossly intact for the purposes of this session


Judgment and insight: Improving mildly





Assessment


Schizophrenia


Nicotine dependence.





Plan:


-Patient continues to meet criteria for inpatient psychiatric admission for 

symptom stabilization and safety.  Patient is currently on active treatment 

order.


-Medications: Patient will be due for her next Invega Sustenna injection on 

6/26/2020 of 234mg IM every monthly.  Continue with Depakote 500 mg BID for mood

stabilization, Cogentin 0.5 mg twice a day for EPS prophylaxis, Seroquel 50 mg 

daily at bedtime for mood stabilization/insomnia, melatonin 6 mg nightly for 

sleep.  Increased gabapentin 600 mg 3 times a day for neuropathic pain. 


-When necessary Ativan and Geodon for agitation/aggression.


-NRT - nicotine patch


-SW on board for discharge planning.  Encouraged the patient to participate in 

milieu.  Patient has poor insight and inability to care for herself.  After 

guardianship hearing on 6/11/2020, patient now has a public guardian and her 

sister as guardians.  Patient will likely be discharged Monday back to her home 

with daily ACT team visits until [facility] can find her an [facility] for permanent 

placement.

## 2020-06-15 VITALS — HEART RATE: 80 BPM | RESPIRATION RATE: 18 BRPM | DIASTOLIC BLOOD PRESSURE: 62 MMHG | SYSTOLIC BLOOD PRESSURE: 113 MMHG

## 2020-06-15 VITALS — TEMPERATURE: 98.4 F

## 2020-06-15 RX ADMIN — DIVALPROEX SODIUM SCH MG: 500 TABLET, FILM COATED, EXTENDED RELEASE ORAL at 08:52

## 2020-06-15 RX ADMIN — ATORVASTATIN CALCIUM SCH MG: 10 TABLET, FILM COATED ORAL at 08:52

## 2020-06-15 RX ADMIN — BENZTROPINE MESYLATE SCH MG: 0.5 TABLET ORAL at 08:52

## 2020-06-15 RX ADMIN — LEVOTHYROXINE SODIUM SCH MCG: 50 TABLET ORAL at 03:19

## 2020-06-15 RX ADMIN — GABAPENTIN SCH MG: 300 CAPSULE ORAL at 10:39

## 2020-06-15 RX ADMIN — ACETAMINOPHEN PRN MG: 500 TABLET ORAL at 03:18

## 2020-06-15 RX ADMIN — GABAPENTIN SCH MG: 300 CAPSULE ORAL at 08:52

## 2020-06-15 RX ADMIN — ATENOLOL SCH MG: 50 TABLET ORAL at 08:49

## 2020-06-15 NOTE — P.DS
Providers


Date of admission: 


06/05/20 02:45





Expected date of discharge: 06/15/20


Attending physician: 


Rony Pompa MD





Consults: 





                                        





06/05/20 02:49


Consult Physician Routine 


   Consulting Provider: Brendon Jacobs Jr


   Consult Reason/Comments: For H &  P for Medical Follow Up


   Do you want consulting provider notified?: Yes











Primary care physician: 


Brendon Jacobs








- Discharge Diagnosis(es)


(1) Schizophrenia


Current Visit: Yes   Status: Acute   Priority: High   





(2) Nicotine dependence


Current Visit: Yes   Status: Acute   Priority: Low   


Hospital Course: 





Admission HPI:


Patient is a 64-year-old  female with a chronic history of 

schizophrenia, is  and lives alone in an apartment and has 3 daughters. 

The Patient presented to the hospital with reports of feeling paranoid and "st

ressed".  The patient was recently discharged from the hospital.  She reports 

that she was not taking her medications.  The patient reports that she was not 

feeling safe at home.  The patient reports that she believes that his mafia her 

around her and gangsters are being after her.  The patient complains of poor 

sleep at night.  As per the ER report the patient was getting increasingly 

manic.  The patient reports that she has been feeling safer and better while she

is in the hospital.  The patient reports compliance with the medications since 

she has been hospitalized. The patient has long psychiatric history and has 

history of multiple psychiatric hospitalizations in the past.  The patient was 

recently discharged from the unit where she was admitted with paranoia and manic

episodes.  The patient reports auditory hallucinations described as hearing 

God's voice telling her to persevere.  The patient denies any suicidal or 

homicidal ideations at this time. Patient states that she has a history of 

schizophrenia and was following up with [facility].  Patient was previously on Lamictal

50 mg daily at bedtime, Invega Sustenna 156 mg every monthly.  Her last 

psychiatric admission was in 10/2019.  Denies any suicide attempts in the past. 

She has a history of multiple overdoses it sounds like, no suicide attempts.  

She denies any current psychiatrist or counselor.  She says she has been hospit

alized a lot.  She says that she feels much better not on medication.  She says 

she did very well on Prozac.  She did not like Depakote or lithium and Lamictal 

made her want to sleep.  Says she has diagnosis history of schizophrenia and 

bipolar disorder.  She does admit to some history of hallucinations, auditory 

hallucinations she says that are the wrong kind.





Hospital course:


Upon admission to the unit patient was initially bizarre and psychotic. Patient 

was however directable and agreeable to commence treatment. Patient got along 

well with other patients on the unit and followed unit protocol.  Patient was 

compliant with the medications and denied any side effects throughout hospital 

course.  Patient was started on Depakote 500 mg twice a day for mood 

stabilization, Cogentin 0.5 mg twice a day for EPS prophylaxis, Seroquel was 

increased to 100 mg nightly for mood stabilization/insomnia, melatonin 6 mg 

nightly for sleep and gabapentin was started and increased to a dose of 600 mg 3

times a day for neuropathic pain.  Patient spoke of her stressors and engaged in

therapy both group and individual.  Patient was also seen by medical team for 

history and physical exam.  Throughout the course of the hospitalization patient

gradually improved with regards to mood, anxiety, psychosis, sleep and became 

future oriented with improved insight and judgment. On the day of discharge 

patient denied any suicidal or homicidal ideations intent or plan denied any 

auditory or visual hallucinations. Patient endorsed wanting to live for her 

family and her future.  The patient denied any access to guns or weapons.  

Patient denied any paranoia and did not endorse any delusions.  Patient does not

have a significant history of substance abuse however was counseled on 

abstaining from all substances including alcohol and marijuana.  Patient was 

also counseled on the medications and need for regular compliance and was 

encouraged to follow-up with their outpatient appointment for mental health and 

also for primary care.  Prior to discharge a family meeting will be arranged by 

 to answer any questions and ensure safety upon discharge.  Due to 

patient not being able to care for herself at home on her own, [facility] will arrange 

with ACT team to go to patient's house daily to help her with her medications 

until patient's guardians find patient a group home/AFC for patient to move i

[facility].  Patient had a guardianship hearing on 6/11/2020 and now patient has a 

public guardian and her sister as a coguardian.





Mental status exam:


General Appearance: Patient appears to be stated age is overweight, alert, 

pleasant, and cooperative. Patient is in no acute distress and has fair hygiene 

and grooming 


Behavior: Patient is calmly seated without any agitated behavior.  Cooperative 

and polite.


Speech: Patient's speech is fluent and nonpressured. 


Mood/Affect: Patient reports their mood is "much better", affect is congruent 

and euthymic. 


Suicidality/Homicidality:  Patient denies having any suicidal or homicidal 

ideation intent or plan.  


Perceptions: Patient denies any auditory or visual hallucinations.  


Though content/process: There is no evidence of any delusional thought content 

and thought process is linear and goal-directed. more future oriented


Memory and concentration: AOX3, grossly intact for the purposes of this session.

Can spell "WORLD" backwards correctly.


Judgment and insight: Improved with guarded prognosis





Impression:


Schizophrenia


Nicotine dependence





Plan:


-Continue with discharge today as patient has improved and stabilized 

psychiatrically and is not currently an imminent threat to herself and/or 

others.


-Continue medications: Patient will be due for her Invega Sustenna injection 234

mg IM on 6/26/2020 and monthly thereafter.  Continue with Depakote 500 mg twice 

a day for mood stabilization, Cogentin 0.5 mg twice a day for EPS prophylaxis, 

Seroquel 100 mg nightly for mood stabilization/insomnia, melatonin 6 mg nightly 

for sleep.  Continue with gabapentin 600 mg 3 times a day for neuropathic pain.


-Patient is currently on 2 antipsychotic medications, Invega Sustenna is a long-

acting base medication for her and Seroquel was added to control patient's 

excessive psychotic symptoms and help with mood stabilization and insomnia and 

can be reevaluated in the future for its need by her outpatient psychiatrist.


-Patient was counseled on the need for medication compliance and appropriate 

follow-up at mental health and also primary care for medical issues.  Patient 

verbalized understanding and agreed.


-Social work to arrange for and conduct family meeting to ensure safety upon 

discharge and answer any questions/concerns. Social work also to arrange for 

patients follow up appointments with [facility] for psychiatric care along with follow 

up with primary care provider.


-Due to patient not being able to care for herself at home on her own, [facility] will 

arrange with ACT team to go to patient's house daily to help her with her 

medications until patient's guardians find patient a group home/AFC for patient 

to move into.


-Patient counseled on abstaining from recreational drugs and marijuana and 

alcohol. Was informed/educated on the adverse effects on their physical and 

mental health. Patient verbally agreed and understood. 


-Patient was instructed to return to the hospital or seek immediate medical care

if their psychiatric or medical symptoms do worsen or reoccur.








                                    Allergies











Allergy/AdvReac Type Severity Reaction Status Date / Time


 


nicotine Allergy Severe Rash/Hives Verified 06/05/20 06:22


 


ibuprofen [From Motrin] Allergy  Swelling Verified 06/05/20 06:22


 


NSAIDS (Non-Steroidal Allergy  Rash/Hives Verified 06/05/20 06:22





Anti-Inflamma     











                               Laboratory Results











WBC  7.9 k/uL (3.8-10.6)   06/05/20  07:49    


 


RBC  4.33 m/uL (3.80-5.40)   06/05/20  07:49    


 


Hgb  14.4 gm/dL (11.4-16.0)   06/05/20  07:49    


 


Hct  42.3 % (34.0-46.0)   06/05/20  07:49    


 


MCV  97.6 fL (80.0-100.0)   06/05/20  07:49    


 


MCH  33.2 pg (25.0-35.0)   06/05/20  07:49    


 


MCHC  34.0 g/dL (31.0-37.0)   06/05/20  07:49    


 


RDW  11.9 % (11.5-15.5)   06/05/20  07:49    


 


Plt Count  141 k/uL (150-450)  L  06/05/20  07:49    


 


Neutrophils %  62 %  06/05/20  07:49    


 


Lymphocytes %  29 %  06/05/20  07:49    


 


Monocytes %  7 %  06/05/20  07:49    


 


Eosinophils %  1 %  06/05/20  07:49    


 


Basophils %  0 %  06/05/20  07:49    


 


Neutrophils #  4.9 k/uL (1.3-7.7)   06/05/20  07:49    


 


Lymphocytes #  2.3 k/uL (1.0-4.8)   06/05/20  07:49    


 


Monocytes #  0.5 k/uL (0-1.0)   06/05/20  07:49    


 


Eosinophils #  0.1 k/uL (0-0.7)   06/05/20  07:49    


 


Basophils #  0.0 k/uL (0-0.2)   06/05/20  07:49    


 


Sodium  138 mmol/L (137-145)   06/05/20  07:49    


 


Potassium  3.9 mmol/L (3.5-5.1)   06/05/20  07:49    


 


Chloride  106 mmol/L ()   06/05/20  07:49    


 


Carbon Dioxide  23 mmol/L (22-30)   06/05/20  07:49    


 


Anion Gap  9 mmol/L  06/05/20  07:49    


 


BUN  11 mg/dL (7-17)   06/05/20  07:49    


 


Creatinine  0.55 mg/dL (0.52-1.04)   06/05/20  07:49    


 


Est GFR (CKD-EPI)AfAm  >90  (>60 ml/min/1.73 sqM)   06/05/20  07:49    


 


Est GFR (CKD-EPI)NonAf  >90  (>60 ml/min/1.73 sqM)   06/05/20  07:49    


 


Glucose  110 mg/dL (74-99)  H  06/05/20  07:49    


 


Calcium  9.5 mg/dL (8.4-10.2)   06/05/20  07:49    


 


Total Bilirubin  0.7 mg/dL (0.2-1.3)   06/05/20  07:49    


 


AST  47 U/L (14-36)  H  06/05/20  07:49    


 


ALT  32 U/L (4-34)   06/05/20  07:49    


 


Alkaline Phosphatase  65 U/L ()   06/05/20  07:49    


 


Total Protein  7.4 g/dL (6.3-8.2)   06/05/20  07:49    


 


Albumin  4.1 g/dL (3.5-5.0)   06/05/20  07:49    


 


Urine Color  Light Yellow   06/05/20  00:43    


 


Urine Appearance  Clear  (Clear)   06/05/20  00:43    


 


Urine pH  6.5  (5.0-8.0)   06/05/20  00:43    


 


Ur Specific Gravity  1.004  (1.001-1.035)   06/05/20  00:43    


 


Urine Protein  Negative  (Negative)   06/05/20  00:43    


 


Urine Glucose (UA)  Negative  (Negative)   06/05/20  00:43    


 


Urine Ketones  Negative  (Negative)   06/05/20  00:43    


 


Urine Blood  Small  (Negative)  H  06/05/20  00:43    


 


Urine Nitrite  Negative  (Negative)   06/05/20  00:43    


 


Urine Bilirubin  Negative  (Negative)   06/05/20  00:43    


 


Urine Urobilinogen  <2.0 mg/dL (<2.0)   06/05/20  00:43    


 


Ur Leukocyte Esterase  Negative  (Negative)   06/05/20  00:43    


 


Urine RBC  2 /hpf (0-5)   06/05/20  00:43    


 


Urine WBC  1 /hpf (0-5)   06/05/20  00:43    


 


Ur Squamous Epith Cells  1 /hpf (0-4)   06/05/20  00:43    


 


Urine Bacteria  Moderate /hpf (None)  H  06/05/20  00:43    


 


Urine Mucus  Rare /hpf (None)  H  06/05/20  00:43    


 


Urine Opiates Screen  Not Detected  (NotDetected)   06/05/20  00:43    


 


Ur Oxycodone Screen  Not Detected  (NotDetected)   06/05/20  00:43    


 


Urine Methadone Screen  Not Detected  (NotDetected)   06/05/20  00:43    


 


Ur Propoxyphene Screen  Not Detected  (NotDetected)   06/05/20  00:43    


 


Ur Barbiturates Screen  Not Detected  (NotDetected)   06/05/20  00:43    


 


Valproic Acid  13.5 ug/mL  06/05/20  07:49    


 


U Tricyclic Antidepress  Not Detected  (NotDetected)   06/05/20  00:43    


 


Ur Phencyclidine Scrn  Not Detected  (NotDetected)   06/05/20  00:43    


 


Ur Amphetamines Screen  Not Detected  (NotDetected)   06/05/20  00:43    


 


U Methamphetamines Scrn  Not Detected  (NotDetected)   06/05/20  00:43    


 


U Benzodiazepines Scrn  Detected  (NotDetected)  H  06/05/20  00:43    


 


Urine Cocaine Screen  Not Detected  (NotDetected)   06/05/20  00:43    


 


U Marijuana (THC) Screen  Not Detected  (NotDetected)   06/05/20  00:43    











                                   Vital Signs











Temp  97.9 F   06/15/20 03:47


 


Pulse  80   06/15/20 03:47


 


Resp  18   06/15/20 03:47


 


BP  113/62   06/15/20 03:47


 


Pulse Ox  97   06/15/20 03:47








                                 Intake & Output











 06/14/20 06/15/20 06/15/20





 18:59 06:59 18:59


 


Weight 101.831 kg  











Patient Condition at Discharge: Stable





Plan - Discharge Summary


Discharge Rx Participant: No


New Discharge Prescriptions: 


New


   Benztropine Mesylate [Cogentin] 0.5 mg PO BID 30 Days  tab


   Divalproex [Depakote] 500 mg PO BID 30 Days  tablet.dr


   Gabapentin 600 mg PO TID 30 Days  tab


   Atorvastatin [Lipitor] 10 mg PO DAILY 30 Days  tab


   Melatonin 6 mg PO HS 30 Days  tablet


   guaiFENesin [Mucinex] 600 mg PO Q12HR 30 Days  tablet.er


   Nicotine Polacrilex [Nicorette] 2 mg BUCCAL Q4HR PRN 14 Days  gum


     PRN Reason: Nicotine Cravings


   amLODIPine [Norvasc] 10 mg PO DAILY 30 Days  tab


   QUEtiapine [SEROquel] 100 mg PO HS 30 Days  tab


   Levothyroxine Sodium [Synthroid] 50 mcg PO 0600 30 Days  tab


   Atenolol [Tenormin] 50 mg PO DAILY 30 Days  tab


   Acetaminophen Tab [Tylenol] 1,000 mg PO Q8HR PRN 30 Days  tab


     PRN Reason: Fever And/ Or Pain


   Paliperidone IM [Invega Sustenna] 234 mg IM AS DIRECTED #1 syr





Continue


   Acetaminophen Tab [Tylenol] 1,000 mg PO Q8HR PRN  tab


     PRN Reason: Fever And/ Or Pain





Discontinued


   Benztropine Mesylate [Cogentin] 0.5 mg PO BID 30 Days  tab


   Divalproex ER [Depakote ER] 250 mg PO BID 30 Days  tab.er.24h


   Atorvastatin [Lipitor] 10 mg PO DAILY 30 Days  tab


   Melatonin 6 mg PO HS 30 Days  tablet


   guaiFENesin [Mucinex] 600 mg PO Q12HR 30 Days  tablet.er


   amLODIPine [Norvasc] 10 mg PO DAILY 30 Days  tab


   Levothyroxine Sodium [Synthroid] 50 mcg PO 0600 30 Days  tab


   Atenolol [Tenormin] 50 mg PO DAILY 30 Days  tab


   Paliperidone IM [Invega Sustenna] 234 mg IM AS DIRECTED #1 syr


   Propranolol [Inderal] 20 mg PO BID PRN 30 Days  tab


     PRN Reason: Akithesia


Discharge Medication List





Acetaminophen Tab [Tylenol] 1,000 mg PO Q8HR PRN  tab 06/03/20 [Rx]


Acetaminophen Tab [Tylenol] 1,000 mg PO Q8HR PRN 30 Days  tab 06/15/20 [Rx]


Atenolol [Tenormin] 50 mg PO DAILY 30 Days  tab 06/15/20 [Rx]


Atorvastatin [Lipitor] 10 mg PO DAILY 30 Days  tab 06/15/20 [Rx]


Benztropine Mesylate [Cogentin] 0.5 mg PO BID 30 Days  tab 06/15/20 [Rx]


Divalproex [Depakote] 500 mg PO BID 30 Days  tablet. 06/15/20 [Rx]


Gabapentin 600 mg PO TID 30 Days  tab 06/15/20 [Rx]


Levothyroxine Sodium [Synthroid] 50 mcg PO 0600 30 Days  tab 06/15/20 [Rx]


Melatonin 6 mg PO HS 30 Days  tablet 06/15/20 [Rx]


Nicotine Polacrilex [Nicorette] 2 mg BUCCAL Q4HR PRN 14 Days  gum 06/15/20 [Rx]


Paliperidone IM [Invega Sustenna] 234 mg IM AS DIRECTED #1 syr 06/15/20 [Rx]


QUEtiapine [SEROquel] 100 mg PO HS 30 Days  tab 06/15/20 [Rx]


amLODIPine [Norvasc] 10 mg PO DAILY 30 Days  tab 06/15/20 [Rx]


guaiFENesin [Mucinex] 600 mg PO Q12HR 30 Days  tablet.er 06/15/20 [Rx]








Follow up Appointment(s)/Referral(s): 


Brendon Jacobs Jr,  [Primary Care Provider] - 1 Week


Activity/Diet/Wound Care/Special Instructions: 


Activity and diet as tolerated. Avoid the use of street drugs and alcohol. Take 

all medications as prescribed. When you are in need of refills on your 

medications please contact your medical provider and/or outpatient psychiatrist 

to have this done. Please go to scheduled outpatient appointment for aftercare 

treatment. If symptoms return or become worse, call the crisis line at 

[phone] and/or go to the nearest emergency room for evaluation.


Discharge Disposition: HOME SELF-CARE

## 2020-07-13 ENCOUNTER — HOSPITAL ENCOUNTER (OUTPATIENT)
Dept: HOSPITAL 47 - LABWHC1 | Age: 65
Discharge: HOME | End: 2020-07-13
Attending: NURSE PRACTITIONER
Payer: COMMERCIAL

## 2020-07-13 DIAGNOSIS — Z79.899: ICD-10-CM

## 2020-07-13 DIAGNOSIS — F20.9: Primary | ICD-10-CM

## 2020-07-13 LAB
ALBUMIN SERPL-MCNC: 4.1 G/DL (ref 3.8–4.9)
ALBUMIN/GLOB SERPL: 1.58 G/DL (ref 1.6–3.17)
ALP SERPL-CCNC: 95 U/L (ref 41–126)
ALT SERPL-CCNC: 37 U/L (ref 8–44)
ANION GAP SERPL CALC-SCNC: 7.8 MMOL/L (ref 4–12)
AST SERPL-CCNC: 29 U/L (ref 13–35)
BASOPHILS # BLD AUTO: 0 K/UL (ref 0–0.2)
BASOPHILS NFR BLD AUTO: 1 %
BUN SERPL-SCNC: 22 MG/DL (ref 9–27)
BUN/CREAT SERPL: 36.67 RATIO (ref 12–20)
CALCIUM SPEC-MCNC: 9.4 MG/DL (ref 8.7–10.3)
CHLORIDE SERPL-SCNC: 108 MMOL/L (ref 96–109)
CO2 SERPL-SCNC: 25.2 MMOL/L (ref 21.6–31.8)
EOSINOPHIL # BLD AUTO: 0.1 K/UL (ref 0–0.7)
EOSINOPHIL NFR BLD AUTO: 2 %
ERYTHROCYTE [DISTWIDTH] IN BLOOD BY AUTOMATED COUNT: 4.23 M/UL (ref 3.8–5.4)
ERYTHROCYTE [DISTWIDTH] IN BLOOD: 12.9 % (ref 11.5–15.5)
GLOBULIN SER CALC-MCNC: 2.6 G/DL (ref 1.6–3.3)
GLUCOSE SERPL-MCNC: 104 MG/DL (ref 70–110)
HCT VFR BLD AUTO: 41.7 % (ref 34–46)
HGB BLD-MCNC: 13.4 GM/DL (ref 11.4–16)
LYMPHOCYTES # SPEC AUTO: 1.7 K/UL (ref 1–4.8)
LYMPHOCYTES NFR SPEC AUTO: 25 %
MCH RBC QN AUTO: 31.8 PG (ref 25–35)
MCHC RBC AUTO-ENTMCNC: 32.2 G/DL (ref 31–37)
MCV RBC AUTO: 98.6 FL (ref 80–100)
MONOCYTES # BLD AUTO: 0.4 K/UL (ref 0–1)
MONOCYTES NFR BLD AUTO: 6 %
NEUTROPHILS # BLD AUTO: 4.5 K/UL (ref 1.3–7.7)
NEUTROPHILS NFR BLD AUTO: 65 %
PLATELET # BLD AUTO: 124 K/UL (ref 150–450)
POTASSIUM SERPL-SCNC: 4.1 MMOL/L (ref 3.5–5.5)
PROT SERPL-MCNC: 6.7 G/DL (ref 6.2–8.2)
SODIUM SERPL-SCNC: 141 MMOL/L (ref 135–145)
WBC # BLD AUTO: 7 K/UL (ref 3.8–10.6)

## 2020-07-13 PROCEDURE — 36415 COLL VENOUS BLD VENIPUNCTURE: CPT

## 2020-07-13 PROCEDURE — 80164 ASSAY DIPROPYLACETIC ACD TOT: CPT

## 2020-07-13 PROCEDURE — 80053 COMPREHEN METABOLIC PANEL: CPT

## 2020-07-13 PROCEDURE — 85025 COMPLETE CBC W/AUTO DIFF WBC: CPT

## 2021-01-14 ENCOUNTER — HOSPITAL ENCOUNTER (EMERGENCY)
Dept: HOSPITAL 47 - EC | Age: 66
Discharge: HOME | End: 2021-01-14
Payer: MEDICARE

## 2021-01-14 VITALS
HEART RATE: 81 BPM | SYSTOLIC BLOOD PRESSURE: 127 MMHG | RESPIRATION RATE: 20 BRPM | TEMPERATURE: 98.8 F | DIASTOLIC BLOOD PRESSURE: 61 MMHG

## 2021-01-14 DIAGNOSIS — Z87.891: ICD-10-CM

## 2021-01-14 DIAGNOSIS — Z88.8: ICD-10-CM

## 2021-01-14 DIAGNOSIS — Z04.6: Primary | ICD-10-CM

## 2021-01-14 DIAGNOSIS — Z88.6: ICD-10-CM

## 2021-01-14 PROCEDURE — 99284 EMERGENCY DEPT VISIT MOD MDM: CPT

## 2021-01-14 PROCEDURE — 82075 ASSAY OF BREATH ETHANOL: CPT

## 2021-01-14 PROCEDURE — 96372 THER/PROPH/DIAG INJ SC/IM: CPT

## 2021-01-14 NOTE — ED
Psych HPI





- General


Chief Complaint: Psychiatric Symptoms


Stated Complaint: Mental Health


Time Seen by Provider: 01/14/21 21:25


Source: patient, police


Mode of arrival: ambulatory





- History of Present Illness


Initial Comments: 





This patient is a 65-year-old woman who is brought by local law enforcement to 

have Court mandated psychiatric medication administration.





When I interview the patient, she is without any medical complaints and states 

that she is feeling pretty well.


MD Complaint: other


Associated Psychiatric Symptoms: none


History of same: Yes


Quality: resolved prior to arrival


Improves With: none


Worsens With: none


Associated Symptoms: denies other symptoms





- Related Data


                                  Previous Rx's











 Medication  Instructions  Recorded


 


Acetaminophen Tab [Tylenol] 1,000 mg PO Q8HR PRN  tab 06/03/20


 


Acetaminophen Tab [Tylenol] 1,000 mg PO Q8HR PRN 30 Days  tab 06/15/20


 


Atorvastatin [Lipitor] 10 mg PO DAILY 30 Days  tab 06/15/20


 


Benztropine Mesylate [Cogentin] 0.5 mg PO BID 30 Days  tab 06/15/20


 


Divalproex [Depakote] 500 mg PO BID 30 Days  tablet.dr 06/15/20


 


Gabapentin 600 mg PO TID 30 Days  tab 06/15/20


 


Levothyroxine Sodium [Synthroid] 50 mcg PO 0600 30 Days  tab 06/15/20


 


Melatonin 6 mg PO HS 30 Days  tablet 06/15/20


 


Nicotine Polacrilex [Nicorette] 2 mg BUCCAL Q4HR PRN 14 Days  gum 06/15/20


 


Paliperidone IM [Invega Sustenna] 234 mg IM AS DIRECTED #1 syr 06/15/20


 


QUEtiapine [SEROquel] 100 mg PO HS 30 Days  tab 06/15/20


 


amLODIPine [Norvasc] 10 mg PO DAILY 30 Days  tab 06/15/20


 


atenoloL [Tenormin] 50 mg PO DAILY 30 Days  tab 06/15/20


 


guaiFENesin [Mucinex] 600 mg PO Q12HR 30 Days  tablet.er 06/15/20











                                    Allergies











Allergy/AdvReac Type Severity Reaction Status Date / Time


 


nicotine Allergy Severe Rash/Hives Verified 01/14/21 21:23


 


ibuprofen [From Motrin] Allergy  Swelling Verified 01/14/21 21:23


 


NSAIDS (Non-Steroidal Allergy  Rash/Hives Verified 01/14/21 21:23





Anti-Inflamma     














Review of Systems


ROS Statement: 


Those systems with pertinent positive or pertinent negative responses have been 

documented in the HPI.





ROS Other: All systems not noted in ROS Statement are negative.


Constitutional: Denies: fever, chills


Respiratory: Denies: cough, dyspnea


Cardiovascular: Denies: chest pain, syncope


Gastrointestinal: Denies: abdominal pain, vomiting, diarrhea


Skin: Denies: rash


Neurological: Denies: headache, weakness


Psychiatric: Denies: anxiety, depression, auditory hallucinations, visual 

hallucinations, homicidal thoughts, suicidal thoughts





Past Medical History


Past Medical History: Hypertension, Memory Impairment, Thyroid Disorder


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


History of Any Multi-Drug Resistant Organisms: None Reported


Past Surgical History: Tubal Ligation


Past Anesthesia/Blood Transfusion Reactions: No Reported Reaction


Past Psychological History: Anxiety, Bipolar, Depression, Schizophrenia


Smoking Status: Former smoker


Past Alcohol Use History: None Reported


Past Drug Use History: None Reported





- Past Family History


  ** Father


Family Medical History: Unable to Obtain


Additional Family Medical History / Comment(s): Father is alive at age 86 with 

history of diabetes.





  ** Mother


Additional Family Medical History / Comment(s): Mother is alive at age 85 with 

history of hypertension, temporal arteritis, peripheral vascular disease status 

post carotid surgery, blindness in the left eye.





  ** Sister(s)


Additional Family Medical History / Comment(s): Patient has 4 sisters with no 

major medical problems.  Patient has 3 daughters with no major medical problems.





General Exam


Limitations: no limitations


General appearance: alert, in no apparent distress


Head exam: Present: atraumatic, normocephalic


Eye exam: Present: normal appearance


Respiratory exam: Present: normal lung sounds bilaterally.  Absent: respiratory 

distress, wheezes, rales, rhonchi, stridor


Cardiovascular Exam: Present: regular rate, normal rhythm, normal heart sounds. 

Absent: systolic murmur, diastolic murmur, rubs, gallop


Neurological exam: Present: alert


Psychiatric exam: Present: normal affect, normal mood.  Absent: depressed, 

agitated, anxious, manic, homicidal ideation, suicidal ideation


Skin exam: Present: warm, dry, intact, normal color.  Absent: rash





Course





                                   Vital Signs











  01/14/21





  21:20


 


Temperature 98.8 F


 


Pulse Rate 81


 


Respiratory 20





Rate 


 


Blood Pressure 127/61


 


O2 Sat by Pulse 97





Oximetry 














Disposition


Clinical Impression: 


 Medication administered





Disposition: HOME SELF-CARE


Condition: Good


Is patient prescribed a controlled substance at d/c from ED?: No


Referrals: 


Brendon Jacobs Jr,  [Primary Care Provider] - 1-2 days

## 2021-09-06 ENCOUNTER — HOSPITAL ENCOUNTER (EMERGENCY)
Dept: HOSPITAL 47 - EC | Age: 66
Discharge: HOME | End: 2021-09-06
Payer: MEDICARE

## 2021-09-06 VITALS
TEMPERATURE: 98.5 F | RESPIRATION RATE: 18 BRPM | DIASTOLIC BLOOD PRESSURE: 74 MMHG | HEART RATE: 101 BPM | SYSTOLIC BLOOD PRESSURE: 134 MMHG

## 2021-09-06 DIAGNOSIS — Z88.6: ICD-10-CM

## 2021-09-06 DIAGNOSIS — F41.9: ICD-10-CM

## 2021-09-06 DIAGNOSIS — F17.200: ICD-10-CM

## 2021-09-06 DIAGNOSIS — F31.9: ICD-10-CM

## 2021-09-06 DIAGNOSIS — I10: ICD-10-CM

## 2021-09-06 DIAGNOSIS — M54.9: Primary | ICD-10-CM

## 2021-09-06 PROCEDURE — 96372 THER/PROPH/DIAG INJ SC/IM: CPT

## 2021-09-06 PROCEDURE — 99283 EMERGENCY DEPT VISIT LOW MDM: CPT

## 2021-09-06 NOTE — ED
General Adult HPI





- General


Chief complaint: Back Pain/Injury


Stated complaint: back pain


Time Seen by Provider: 09/06/21 09:20


Source: patient, RN notes reviewed


Mode of arrival: ambulatory


Limitations: no limitations





- History of Present Illness


Initial comments: 





65-year-old female with a past medical history of hypertension, psychiatric 

disorders, back problems presents to the emergency room for a chief complaint of

back pain.  Patient has had back pain for the past 6 weeks or so.  This started 

after she fell out of her truck.  [facility] and 

reports she has had several x-rays of her back.  4 days ago she had an MRI and 

an EMG performed.  Patient was started on [facility]abapentin [facility] and is awaiting testing results for further pain management.  Patient

denies any bladder or bowel changes.  Denies saddle anesthesia.  Denies weakness

of the legs.  Does state that sometimes the pain radiates down the right leg.  

Denies fevers or chills.  Patient has no other complaints at this time including

shortness of breath, chest pain, abdominal pain, nausea or vomiting, headache, 

or visual changes.





- Related Data


                                  Previous Rx's











 Medication  Instructions  Recorded


 


Acetaminophen Tab [Tylenol] 1,000 mg PO Q8HR PRN  tab 06/03/20


 


Acetaminophen Tab [Tylenol] 1,000 mg PO Q8HR PRN 30 Days  tab 06/15/20


 


Atorvastatin [Lipitor] 10 mg PO DAILY 30 Days  tab 06/15/20


 


Benztropine Mesylate [Cogentin] 0.5 mg PO BID 30 Days  tab 06/15/20


 


Divalproex [Depakote] 500 mg PO BID 30 Days  tablet. 06/15/20


 


Gabapentin 600 mg PO TID 30 Days  tab 06/15/20


 


Levothyroxine Sodium [Synthroid] 50 mcg PO 0600 30 Days  tab 06/15/20


 


Melatonin 6 mg PO HS 30 Days  tablet 06/15/20


 


Nicotine Polacrilex [Nicorette] 2 mg BUCCAL Q4HR PRN 14 Days  gum 06/15/20


 


Paliperidone IM [Invega Sustenna] 234 mg IM AS DIRECTED #1 syr 06/15/20


 


QUEtiapine [SEROquel] 100 mg PO HS 30 Days  tab 06/15/20


 


amLODIPine [Norvasc] 10 mg PO DAILY 30 Days  tab 06/15/20


 


atenoloL [Tenormin] 50 mg PO DAILY 30 Days  tab 06/15/20


 


guaiFENesin [Mucinex] 600 mg PO Q12HR 30 Days  tablet.er 06/15/20











                                    Allergies











Allergy/AdvReac Type Severity Reaction Status Date / Time


 


nicotine Allergy Severe Rash/Hives Verified 09/06/21 09:19


 


ibuprofen [From Motrin] Allergy  Swelling Verified 09/06/21 09:19


 


NSAIDS (Non-Steroidal Allergy  Rash/Hives Verified 09/06/21 09:19





Anti-Inflamma     














Review of Systems


ROS Statement: 


Those systems with pertinent positive or pertinent negative responses have been 

documented in the HPI.





ROS Other: All systems not noted in ROS Statement are negative.





Past Medical History


Past Medical History: Hypertension, Memory Impairment, Thyroid Disorder


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 

Back problems


History of Any Multi-Drug Resistant Organisms: None Reported


Past Surgical History: Tubal Ligation


Past Anesthesia/Blood Transfusion Reactions: No Reported Reaction


Past Psychological History: Anxiety, Bipolar, Depression, Schizophrenia


Smoking Status: Current every day smoker


Past Alcohol Use History: None Reported


Past Drug Use History: None Reported





- Past Family History


  ** Father


Family Medical History: Unable to Obtain


Additional Family Medical History / Comment(s): Father is alive at age 86 with 

history of diabetes.





  ** Mother


Additional Family Medical History / Comment(s): Mother is alive at age 85 with 

history of hypertension, temporal arteritis, peripheral vascular disease status 

post carotid surgery, blindness in the left eye.





  ** Sister(s)


Additional Family Medical History / Comment(s): Patient has 4 sisters with no 

major medical problems.  Patient has 3 daughters with no major medical problems.





General Exam


Limitations: no limitations


General appearance: alert, in no apparent distress


Head exam: Present: atraumatic


Eye exam: Present: normal appearance, PERRL, EOMI.  Absent: scleral icterus, 

conjunctival injection


ENT exam: Present: normal exam, mucous membranes moist


Neck exam: Present: normal inspection, full ROM.  Absent: tenderness


Respiratory exam: Present: normal lung sounds bilaterally.  Absent: respiratory 

distress, wheezes


Cardiovascular Exam: Present: regular rate, normal rhythm, normal heart sounds


GI/Abdominal exam: Present: soft, normal bowel sounds.  Absent: distended, 

tenderness


Extremities exam: Present: normal capillary refill (bilat lower extremities)


Neurological exam: Present: alert





Course


                                   Vital Signs











  09/06/21





  09:16


 


Temperature 98.4 F


 


Pulse Rate 105 H


 


Respiratory 20





Rate 


 


Blood Pressure 157/72


 


O2 Sat by Pulse 96





Oximetry 














Medical Decision Making





- Medical Decision Making





Vitals are stable.  Patient is well appearing.  Patient is afebrile.  Denies any

red flag symptoms.  I did recommend x-rays but patient states she has already 

had several and does not need any additional x-rays.  States she just needs pain

management.  Patient states Toradol does not help.  He will try IM morphine and 

sent patient home with Tylenol 3 for the next day until she can [facility] in [facility] opens tomorrow.  I did discuss any red flag 

symptoms that would require her to return to the emergency room and she is 

agreeable.





Disposition


Clinical Impression: 


 Mechanical back pain





Disposition: HOME SELF-CARE


Condition: Good


Instructions (If sedation given, give patient instructions):  Acute Low Back 

Pain (ED)


Additional Instructions: 


Please take Tylenol 3 for pain as needed.  Follow-up with orthopedics tomorrow 

morning.  Return to the emergency room for any worsening symptoms.


Is patient prescribed a controlled substance at d/c from ED?: No


Referrals: 


GITA Ho III, MD [Primary Care Provider] - 1-2 days


Time of Disposition: 09:59

## 2021-10-13 ENCOUNTER — HOSPITAL ENCOUNTER (OUTPATIENT)
Dept: HOSPITAL 47 - RADNMMAIN | Age: 66
Discharge: HOME | End: 2021-10-13
Attending: PHYSICAL MEDICINE & REHABILITATION
Payer: MEDICARE

## 2021-10-13 DIAGNOSIS — M47.26: Primary | ICD-10-CM

## 2021-10-13 PROCEDURE — 78306 BONE IMAGING WHOLE BODY: CPT

## 2021-10-14 NOTE — NM
EXAMINATION TYPE: NM bone scan whole body

 

DATE OF EXAM: 10/13/2021

 

COMPARISON: None

 

HISTORY: Radiculopathy, back pain

 

Delayed whole-body scanning was performed following the injection of 24.2 mCi Tc 99m MDP.  Images acq
uired 3 hours post injection.

 

FINDINGS: 

 

Patient unable to empty bladder. Soft tissue uptake felt to be within normal limits. Uptake within th
e feet, ankles, shoulders, sternoclavicular joints, hips and elbows, wrists and hands thought likely 
to be degenerative. Some mild uptake is noted in the lower lumbar spine possibly related to facet art
hropathy or degenerative disc disease. Uptake in the maxilla and mandible likely due to periodontal d
isease. Uptake in the cervical spine likely degenerative.

 

IMPRESSION: 

 

Degenerative changes.

## 2021-12-03 NOTE — CT
EXAMINATION TYPE: CT brain wo/w con

 

DATE OF EXAM: 5/25/2020

 

COMPARISON: NONE

 

HISTORY: acute mental status change

 

CT DLP: 1578.2 mGycm  Automated Exposure Control for Dose Reduction was Utilized.

 

TECHNIQUE: CT scan of the head is performed with IV contrast.,CT scan of the head is performed withou
t and with with IV Contrast, patient injected with 100 mL of Isovue 300.

 

FINDINGS:   Noncontrast images show no acute intracranial hemorrhage or midline shift. The ventricles
 and sulci are within normal limits in size. Postcontrast images show no suspicious enhancing intrapa
renchymal mass. The globes are intact and the visualized sinuses are clear. Calvarium is intact.

 

IMPRESSION: No acute infarct, midline shift or mass effect. No abnormal intracranial enhancement. MRI
 could better assess subtle white matter change and could be performed if there is further clinical c
oncern. Apixaban/Eliquis is used to treat and prevent blood clots. If you are not able to swallow the tablets whole, they may be crushed and mixed in water, apple juice, or applesauce and promptly taken within four hours. Never skip a dose of Apixaban/Eliquis. If you forget to take your Apixaban/Eliquis, take a dose as soon as you remember. If it is almost time for your next Apixaban/Eliquis dose, wait until then and take a regular dose. DO NOT take an extra pill to ‘catch up’.  NEVER TAKE A DOUBLE DOSE. Notify your doctor that you missed a dose. Take Apixaban/Eliquis at the same time each morning and evening. Apixaban/Eliquis may be taken with other medication or food.

## 2022-04-21 ENCOUNTER — HOSPITAL ENCOUNTER (OUTPATIENT)
Dept: HOSPITAL 47 - PNWHC3 | Age: 67
End: 2022-04-21
Attending: ANESTHESIOLOGY
Payer: MEDICARE

## 2022-04-21 VITALS
TEMPERATURE: 98 F | RESPIRATION RATE: 18 BRPM | SYSTOLIC BLOOD PRESSURE: 111 MMHG | DIASTOLIC BLOOD PRESSURE: 73 MMHG | HEART RATE: 93 BPM

## 2022-04-21 DIAGNOSIS — F41.9: ICD-10-CM

## 2022-04-21 DIAGNOSIS — Z88.8: ICD-10-CM

## 2022-04-21 DIAGNOSIS — I10: ICD-10-CM

## 2022-04-21 DIAGNOSIS — M48.061: ICD-10-CM

## 2022-04-21 DIAGNOSIS — F17.200: ICD-10-CM

## 2022-04-21 DIAGNOSIS — M47.816: ICD-10-CM

## 2022-04-21 DIAGNOSIS — G89.29: ICD-10-CM

## 2022-04-21 DIAGNOSIS — M51.26: ICD-10-CM

## 2022-04-21 DIAGNOSIS — F20.9: ICD-10-CM

## 2022-04-21 DIAGNOSIS — F31.9: ICD-10-CM

## 2022-04-21 DIAGNOSIS — M51.36: Primary | ICD-10-CM

## 2022-04-21 PROCEDURE — 99211 OFF/OP EST MAY X REQ PHY/QHP: CPT

## 2022-04-21 NOTE — P.CON
Consult Note





- .


Consult date: 04/21/22


Assessment/Plan:: 





HISTORY OF PRESENT ILLNESS:


66 yr old female as a referral from Dr Chatterjee presents today with severe and 

chronic lower back pain secondary to disc bulges, spinal stenosis, 

neuroforaminal stenoses, DDD and facet arthropathy for evaluation.  A shunt 

states her pain is 10 out of 10 in intensity, squeezing, tight, pinching 

sensation the lower aspect of her lumbar spine with radiation of pain to the 

bilateral hips and to the posterior aspects of the lower extremities.  Pain is 

provoked with walking for periods of 10 minutes.  Pain is relieved with 

medications (Tylenol OTC), heat daily, physical therapy which ended November 2021, daily home stretching regimen, laying supine and rest





Past Medical History: Hypertension, Memory Impairment, Thyroid Disorder


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 

Back problems


History of Any Multi-Drug Resistant Organisms: None Reported


Past Surgical History: Tubal Ligation


Past Anesthesia/Blood Transfusion Reactions: No Reported Reaction


Past Psychological History: Anxiety, Bipolar, Depression, Schizophrenia


Smoking Status: Current every day smoker


Past Alcohol Use History: None Reported


Past Drug Use History: None Reported





- Past Family History


  ** Father


Family Medical History: Unable to Obtain


Additional Family Medical History / Comment(s): Father is alive at age 86 with 

history of diabetes.





  ** Mother


Additional Family Medical History / Comment(s): Mother is alive at age 85 with 

history of hypertension, temporal arteritis, peripheral vascular disease status 

post carotid surgery, blindness in the left eye.





  ** Sister(s)


Additional Family Medical History / Comment(s): Patient has 4 sisters with no 

major medical problems.  Patient has 3 daughters with no major medical problems.





All: See list


Meds: See list





REVIEW OF ORGAN SYSTEMS:


                     CONSTITUTIONAL:  No fevers or chills. No recent weight 

loss.


                     HEENT:  No visual acuity loss, eye pain, difficulties with 

hearing. No nosebleeds.  No difficulty swallowing. 


                     RESPIRATORY:  Denies any troubles with breathing or dyspnea

on exertion. 


                     CARDIOVASCULAR:  Denies any chest pain, palpitations, or 

recent heart attacks. 


                     GASTROINTESTINAL: Denies fatty food intolerance. Has change

in bowel habits and gas bloat.  


                     GENITOURINARY:  Denies any blood in urine.  Has increased 

urinary frequency.  


                     NEUROLOGICAL: +  numbness and tingling along the distal 

extremities. No seizure disorders or headaches.


                     MUSCULOSKELETAL: + back pain 


                     SKIN: No  skin cancer. No rash.


                     PSYCHIATRIC:  Denies current depression or suicidal 

thoughts.


                     ENDOCRINE:  Denies current thyroid disorders. Denies any 

blood sugar glucose intolerance.


                     HEME/LYMPHATIC: Denies any lumps and bumps around the neck.

 History of deep venous thrombosis.


                     ALLERGY/IMMUNOLOGY:  No immunoglobulin therapy. No immune 

deficiencies.


                     BREAST: Denies current breast lumps, pain or nipple 

discharge.


    


   Physical Examinations  :


                Constitutional : Cooperative , not in acute distress .


                HEENT:  Neck  supple. No Lymphadenopathy. Normal  thyroid  size 

.


                                         Eyes  no ptosis , no icterus,  no 

photophobia .  


                                         Hearing intact. Normal  oropharynx.   

No Thrush.  


                Respiratory : Chest clear to auscultations bilaterally. No 

wheezing. No rhonchi.  


                Cardiovascular : Regular rate and rhythm , S1 /  S2.   No  S3 . 

No  S4.


                Gastrointestinal :  Abdomen soft. No tenderness. Bowel sounds x 

4. No organomegaly .


                Genitourinary :   Deferred.                                     

                                                                                

                                                                                

                                                                                

                                                                                

          


                Neurologic :   Cranial nerve II   to  XII  intact. No focal 

neurological deficits.


                Psychiatric : alert & oriented  x 3. Matching mood & appropriate

affect. Judgment & insight intact. 


                Lymphatic  No Lymphadenopathy.


                Musculoskeletal :     


                               Cervical Spine 


                                         Motor strength in the deltoid and 

biceps: Normal  right side. Normal  Left side


                                         Motor strength biceps and the wrist 

extensors:   Normal right side . Normal left side 


                                         Motor strength in the triceps muscle: 

Normal right side.  Normal  left side  


                                         Deep tendon reflexes:  Normal at the 

biceps. Normal at Brachioradialis. Normal at triceps


                                         Cervical facet loading test: positive 

bilaterally


                                         Spurling test: positive bilaterally


                                         Neck distraction test: positive 

bilaterally


                                         Josué sign: positive bilaterally


                                  Lumbar spine


                                         Motor strength lower extremities ,thigh

and legs  5/5 Right side ,  5/5  Left side 


                                         Deep tendon reflexes :   Normal  Knee 

Jerk. Normal   Ankle Jerk  


                                         Vertebral body tenderness over L4


                                         Lumbar facet Loading Test: positive 

Right  / positive Left  


                                         Range of motion of the lumbar spine  

Flexion  30 degrees,   extension   10 degrees


                                         Straight Leg Raise test: Left/ Right 

positive at <40 degree   


                                         Talha test: positive right /  positive 

left.


                                         Severe tenderness over the Sacroiliac 

joint  on the Right / Left  sides   


                                         Gaenslen  test: positive bilaterally


                                         Seated flexion test: positive 

bilaterally.





Imaging:


MRI without contrast the lumbar spine from 9/3/21 reviewed





Assessment/ Plan : 


Lumbar DDD, Spondylosis, Facet Arthropathy


Recommendation of LESI L4-L5.  May need a series of injections, up to 3 within a

six-month period, for optimal pain relief.


Risks, benefits of procedure discussed and patient verbalized understanding.


Denies aspirin or anti- coagulant use. Denies Medical History of Diabetes.


All questions answered.


I have spent greater than 50 minutes on patient care today. Dr Carpenter was 

available by phone for the evaluation of this patient. The time was used to 

review the medical records including relevant urine studies and Prescription 

history (MAPs), review of the available imaging, evaluation and examination of 

the patient, coordination of care with the medical staff and if applicable 

referring physicians, as well as creation of the medical record








PQRS Measure Charge Sheet


Mode of Arrival: Ambulatory





- Pain Location


  ** Lower Back


Non-Pharmacological Interventions: Heat, Home Exercise, Ice, Inactivity, 

Meditation, Physical Therapy, Position/Reposition, Stretching


PQRS Narrative: 


                                        





Smoking Status                   Former smoker


Blood Pressure                   111/73


Pain Intensity [Lower Back]      10


Scale Used                       Numeric (1 - 10)


Hx Alcohol Use (MH)              No








Home Medications: 


Ambulatory Orders





Acetaminophen Tab [Tylenol] 1,000 mg PO Q8HR PRN 30 Days  tab 06/15/20 


Atorvastatin [Lipitor] 10 mg PO DAILY 30 Days  tab 06/15/20 


Levothyroxine Sodium [Synthroid] 50 mcg PO 0600 30 Days  tab 06/15/20 


amLODIPine [Norvasc] 10 mg PO DAILY 30 Days  tab 06/15/20 


Benztropine Mesylate [Cogentin] 0.5 mg PO DAILY 01/31/22 


Propranolol HCl 80 mg PO DAILY 01/31/22 


Ziprasidone [Geodon] 80 mg PO DAILY 01/31/22 


hydrOXYzine pamoate [hydrOXYzine PAMOATE] 25 mg PO DAILY 01/31/22 


lamoTRIgine [LaMICtal ODT] 1 tab PO DAILY 04/21/22

## 2022-05-19 ENCOUNTER — HOSPITAL ENCOUNTER (OUTPATIENT)
Dept: HOSPITAL 47 - ORPAIN | Age: 67
Discharge: HOME | End: 2022-05-19
Attending: ANESTHESIOLOGY
Payer: MEDICARE

## 2022-05-19 VITALS — TEMPERATURE: 96.8 F

## 2022-05-19 VITALS — HEART RATE: 84 BPM | RESPIRATION RATE: 16 BRPM

## 2022-05-19 VITALS — SYSTOLIC BLOOD PRESSURE: 116 MMHG | DIASTOLIC BLOOD PRESSURE: 69 MMHG

## 2022-05-19 VITALS — BODY MASS INDEX: 42 KG/M2

## 2022-05-19 DIAGNOSIS — M51.16: ICD-10-CM

## 2022-05-19 DIAGNOSIS — M47.26: Primary | ICD-10-CM

## 2022-05-19 PROCEDURE — 62323 NJX INTERLAMINAR LMBR/SAC: CPT

## 2022-05-19 PROCEDURE — 99152 MOD SED SAME PHYS/QHP 5/>YRS: CPT

## 2022-05-19 RX ADMIN — POTASSIUM CHLORIDE SCH MLS: 14.9 INJECTION, SOLUTION INTRAVENOUS at 10:30

## 2022-05-19 RX ADMIN — POTASSIUM CHLORIDE SCH MLS: 14.9 INJECTION, SOLUTION INTRAVENOUS at 10:34

## 2022-05-19 NOTE — FL
EXAMINATION TYPE: FL guided pain mgmt statistic

 

DATE OF EXAM: 5/19/2022

 

HISTORY: Fluoroscopy  time

 

1 seconds of fluoroscopy provided. 

 

IMPRESSION:

1. Fluoroscopy time.

## 2022-05-19 NOTE — P.PCN
Date of Procedure: 05/19/22


Procedure(s) Performed: 


 


PREOPERATIVE DIAGNOSIS: 


1- Lumbar Degenerative Disc Diseases


2-Lumbar spondylosis with  Facet arthropathy without myelopathy


POSTOPERATIVE DIAGNOSIS: 


Same as preop diagnosis.


PROCEDURE


1. Lumbar epidural steroid injection under fluoroscopic guidance at the L4-5 

level.    (Fluoroscopy imaging was available in radiology department)


2. Lumbar epidurogram. 


ANESTHESIA: Local with 1% lidocaine 3 ml and , moderate sedation with  

intravenous Versed  2  mg ,and fentanyle   100   Mcg


EBL: Minimal


PROCEDURE INDICATION: The patient with low back pain and radiculitis symptoms 

unresponsive to conservative treatment. Fluoroscopy was used to optimize 

visualization of the needle placement and to maximize safety. 


PROCEDURE DESCRIPTION / TECHNIQUE: 


  The patient was seen and identified in the preoperative area. Risks, benefits,

complications including but not limited to infections ,bleeding ,allergic 

reaction to the medications ,nerve damage and not complete pain releife , and 

alternatives were discussed with the patient. The patient agreed to proceed with

the procedure and signed the consent. IV was started, and vital signs were 

stable.


  Patient was taken to the OR and time out was completed. The patient was placed

 in the prone position on procedure table and a pillow was placed under the 

abdomen to reduce lumbar lordosis. The  lumbosacral area was prepped  and draped

in the usual sterile fashion.ere closely monitored during the procedure. C

onscious sedation was used during the procedure to decrease patients anxiety.  

Vital signs was monitered during the entire procedure.


Using anterior-posterior fluoroscopy, the L4-5 interlaminar space was identified

and the skin over this site was marked and then infiltrated with 1% lidocaine 

subcutaneously. Subsequently, a 18-gauge Tuohy epidural needle was inserted and 

advanced toward the epidural space using the ``Loss of resistance technique and 

guided by AP and lateral fluoroscopy. The correct needle position in the 

epidural space was verified with the injection of 2 mL of the water soluble 

contrast dye Isovue 200  contrast and observing an excellent epidurogram with 

the epidural spread of the dye, after negative aspiration for blood and CSF and 

in the absence of paresthesias. Again after negative aspiration, a 6  ml mixture

containing 80 mg of Depo-medrol , and 2  ml of preservative free Normal Saline, 

and 2 ml of preservative free lidocaine 1% solution was injected and a washout 

of epidurogram was seen. Needle was withdrawn intact, skin was cleansed, and 

bandages were applied. 


COMPLICATIONS: None


DISPOSITION / PLANS: The patient was placed in a supine position and transferred

to the recovery area in a stable condition for observation. There was no 

evidence of lower extremity motor or sensory deficit after the procedure.  

Patient was discharged from the recovery room after meeting discharge criteria. 

Home discharge instructions were given to the patient by the staff. The patient 

was reexamined prior to discharge. The patient will schedule a follow up in the 

clinic in 2-4 weeks.

## 2022-06-06 ENCOUNTER — HOSPITAL ENCOUNTER (OUTPATIENT)
Dept: HOSPITAL 47 - PNWHC3 | Age: 67
End: 2022-06-06
Attending: ANESTHESIOLOGY
Payer: MEDICARE

## 2022-06-06 VITALS
TEMPERATURE: 98 F | SYSTOLIC BLOOD PRESSURE: 114 MMHG | RESPIRATION RATE: 18 BRPM | DIASTOLIC BLOOD PRESSURE: 61 MMHG | HEART RATE: 97 BPM

## 2022-06-06 DIAGNOSIS — M51.36: Primary | ICD-10-CM

## 2022-06-06 DIAGNOSIS — Z88.8: ICD-10-CM

## 2022-06-06 DIAGNOSIS — G89.29: ICD-10-CM

## 2022-06-06 DIAGNOSIS — M47.816: ICD-10-CM

## 2022-06-06 DIAGNOSIS — Z87.891: ICD-10-CM

## 2022-06-06 PROCEDURE — 99211 OFF/OP EST MAY X REQ PHY/QHP: CPT

## 2022-06-26 ENCOUNTER — HOSPITAL ENCOUNTER (EMERGENCY)
Dept: HOSPITAL 47 - EC | Age: 67
Discharge: HOME | End: 2022-06-26
Payer: MEDICARE

## 2022-06-26 VITALS — RESPIRATION RATE: 18 BRPM

## 2022-06-26 VITALS — DIASTOLIC BLOOD PRESSURE: 62 MMHG | SYSTOLIC BLOOD PRESSURE: 120 MMHG | HEART RATE: 84 BPM | TEMPERATURE: 98 F

## 2022-06-26 DIAGNOSIS — E78.5: ICD-10-CM

## 2022-06-26 DIAGNOSIS — F17.200: ICD-10-CM

## 2022-06-26 DIAGNOSIS — M54.30: Primary | ICD-10-CM

## 2022-06-26 DIAGNOSIS — I10: ICD-10-CM

## 2022-06-26 PROCEDURE — 99283 EMERGENCY DEPT VISIT LOW MDM: CPT

## 2022-06-26 PROCEDURE — 72110 X-RAY EXAM L-2 SPINE 4/>VWS: CPT

## 2022-06-26 PROCEDURE — 96372 THER/PROPH/DIAG INJ SC/IM: CPT

## 2022-06-26 NOTE — ED
General Adult HPI





- General


Chief complaint: Back Pain/Injury


Stated complaint: Back/Leg pain


Time Seen by Provider: 06/26/22 16:13


Source: patient


Mode of arrival: ambulatory


Limitations: no limitations





- History of Present Illness


Initial comments: 


Patient presents to the ED with her neighbor for evaluation.  Patient states 

that she has chronic lumbar back for which she is followed by pain medicine.  

Patient states that she received a "shot" in her back a few weeks ago with 

relief of her pain, but she states that her pain returned about a week ago.  

Patient also states that she fell a week ago, and she is unsure if she may have 

injured her back at that time.  Patient states that she is scheduled for her 

next pain management "shot" on July 7.  Patient states that she has been taking 

Tylenol for her pain without any relief.  Patient states that her pain is mainly

in her left lower back and radiates down the back of her left leg.  Patient tessa

es leg numbness or weakness, incontinence, urinary retention, fever or chills, 

headache, chest pain, dyspnea, dizziness, abdominal pain, nausea vomiting, 

dysuria/hematuria/urinary symptoms, or any other symptoms or complaints.








- Related Data


                                Home Medications











 Medication  Instructions  Recorded  Confirmed


 


Benztropine Mesylate [Cogentin] 0.5 mg PO DAILY 01/31/22 06/06/22


 


Propranolol HCl 80 mg PO DAILY 01/31/22 06/06/22


 


Ziprasidone [Geodon] 80 mg PO DAILY 01/31/22 06/06/22


 


hydrOXYzine pamoate [hydrOXYzine 25 mg PO DAILY 01/31/22 06/06/22





PAMOATE]   


 


lamoTRIgine [LaMICtal ODT] 200 mg PO DAILY 04/21/22 06/06/22








                                  Previous Rx's











 Medication  Instructions  Recorded


 


Acetaminophen Tab [Tylenol] 1,000 mg PO Q8HR PRN 30 Days  tab 06/15/20


 


Atorvastatin [Lipitor] 10 mg PO DAILY 30 Days  tab 06/15/20


 


Levothyroxine Sodium [Synthroid] 50 mcg PO 0600 30 Days  tab 06/15/20


 


amLODIPine [Norvasc] 10 mg PO DAILY 30 Days  tab 06/15/20











                                    Allergies











Allergy/AdvReac Type Severity Reaction Status Date / Time


 


No Known Allergies Allergy   Verified 06/26/22 16:23














Review of Systems


ROS Statement: 


Those systems with pertinent positive or pertinent negative responses have been 

documented in the HPI.





ROS Other: All systems not noted in ROS Statement are negative.





Past Medical History


Past Medical History: Eye Disorder, Hyperlipidemia, Hypertension, Memory 

Impairment, Thyroid Disorder


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 

back problems.


History of Any Multi-Drug Resistant Organisms: None Reported


Past Surgical History: Tubal Ligation


Past Anesthesia/Blood Transfusion Reactions: No Reported Reaction


Past Psychological History: Anxiety, Bipolar, Depression, Schizophrenia


Smoking Status: Current every day smoker


Past Alcohol Use History: None Reported


Past Drug Use History: None Reported





- Past Family History


  ** Father


Family Medical History: Unable to Obtain, Diabetes Mellitus





  ** Mother


Family Medical History: Eye Disorder, Hypertension


Additional Family Medical History / Comment(s): Mother is alive at age 85 with 

history of temporal arteritis, peripheral vascular disease status post carotid 

surgery, blindness in the left eye.





  ** Sister(s)


Additional Family Medical History / Comment(s): Patient has 4 sisters with no 

major medical problems.  Patient has 3 daughters with no major medical problems.





General Exam


Limitations: no limitations


General appearance: alert, in no apparent distress


Head exam: Present: atraumatic, normocephalic


Eye exam: Present: normal appearance, EOMI


ENT exam: Present: mucous membranes moist


Neck exam: Present: other (Trachea is in midline)


Respiratory exam: Present: normal lung sounds bilaterally.  Absent: respiratory 

distress, wheezes, rales, rhonchi, stridor


Cardiovascular Exam: Present: regular rate, normal rhythm, normal heart sounds, 

other (Normal radial pulses bilaterally)


GI/Abdominal exam: Present: soft, other (Obese abdomen).  Absent: tenderness, 

guarding


Extremities exam: Present: full ROM.  Absent: tenderness, pedal edema, calf 

tenderness


Back exam: Present: other (Mild left lumbar back tenderness).  Absent: CVA 

tenderness (R), CVA tenderness (L)


Neurological exam: Present: alert, oriented X3, other (No evidence of lower 

extremity neurological deficit or saddle anesthesia).  Absent: motor sensory 

deficit


Psychiatric exam: Present: normal affect, normal mood


Skin exam: Present: warm, dry, intact, normal color





Course


                                   Vital Signs











  06/26/22





  15:58


 


Temperature 97.8 F


 


Pulse Rate 88


 


Respiratory 18





Rate 


 


Blood Pressure 116/60


 


O2 Sat by Pulse 96





Oximetry 














Medical Decision Making





- Medical Decision Making


Patient's lumbosacral spine x-rays are negative.  Patient denies having any leg 

numbness/weakness or incontinence/urinary retention, and she has no evidence of 

lower extremity neurological deficit or saddle anesthesia on exam.  I suspect 

that the patient's pain is likely chronic in etiology, and I do not suspect an 

emergent medical condition at this time.  Patient reports that her pain has 

improved with ED treatment, and she feels comfortable going home at this time.  

Patient is aware of her x-ray findings.  Patient was counseled about lumbar back

 pain/sciatica, and she was clearly explained return and follow-up instructions.

  Patient was instructed to follow up closely with her primary care provider.  

Will provide patient with a starter pack of Tylenol #3's.





- Radiology Data


Lumbosacral spine x-rays: No acute abnormality the lumbar spine.  No compression

 fracture.





Disposition


Clinical Impression: 


 Sciatica, Lumbar back pain





Disposition: HOME SELF-CARE


Condition: Stable


Instructions (If sedation given, give patient instructions):  Sciatica (ED), 

Back Pain (ED)


Additional Instructions: 


Return to the ER immediately should you develop new or worsening pain, trouble 

controlling your bladder or bowels, leg numbness or weakness, shortness of 

breath, feeling dizzy or faint, vomiting, a fever, or new or worsening symptoms.

  Follow up closely with your primary care provider.


Is patient prescribed a controlled substance at d/c from ED?: No


Referrals: 


Yesica Gallegos NPC [Primary Care Provider] - 1-2 days


Time of Disposition: 17:51

## 2022-06-26 NOTE — XR
EXAMINATION TYPE: XR lumbosacral spine min 4V

 

DATE OF EXAM: 6/26/2022

 

COMPARISON: NONE

 

HISTORY: Back pain

 

TECHNIQUE: 5 views

 

FINDINGS: There is a mild degenerative first degree L4-5 spondylolisthesis. No lumbar compression fra
cture. There is osteopenia. Sacroiliac joints are intact. Abdominal aorta is atheromatous.

 

IMPRESSION: No acute abnormality of the lumbar spine. No compression fracture.

## 2022-07-04 ENCOUNTER — HOSPITAL ENCOUNTER (EMERGENCY)
Dept: HOSPITAL 47 - EC | Age: 67
Discharge: HOME | End: 2022-07-04
Payer: MEDICARE

## 2022-07-04 VITALS — RESPIRATION RATE: 18 BRPM | TEMPERATURE: 97.9 F | HEART RATE: 102 BPM

## 2022-07-04 VITALS — DIASTOLIC BLOOD PRESSURE: 67 MMHG | SYSTOLIC BLOOD PRESSURE: 139 MMHG

## 2022-07-04 DIAGNOSIS — Z79.899: ICD-10-CM

## 2022-07-04 DIAGNOSIS — F17.200: ICD-10-CM

## 2022-07-04 DIAGNOSIS — Z79.890: ICD-10-CM

## 2022-07-04 DIAGNOSIS — I10: ICD-10-CM

## 2022-07-04 DIAGNOSIS — E78.5: ICD-10-CM

## 2022-07-04 DIAGNOSIS — F41.9: ICD-10-CM

## 2022-07-04 DIAGNOSIS — E07.9: ICD-10-CM

## 2022-07-04 DIAGNOSIS — F31.9: ICD-10-CM

## 2022-07-04 DIAGNOSIS — M43.16: Primary | ICD-10-CM

## 2022-07-04 DIAGNOSIS — F20.9: ICD-10-CM

## 2022-07-04 PROCEDURE — 99283 EMERGENCY DEPT VISIT LOW MDM: CPT

## 2022-07-04 PROCEDURE — 96372 THER/PROPH/DIAG INJ SC/IM: CPT

## 2022-07-04 NOTE — ED
Back Pain HPI





- General


Chief Complaint: Back Pain/Injury


Stated Complaint: Back Pain


Time Seen by Provider: 07/04/22 10:59


Source: patient


Limitations: no limitations





- History of Present Illness


Initial Comments: 


Patient is a 66-year-old female with a past medical history significant for 

chronic lumbar back pain who presents to the emergency department with a chief 

complaint of lower back pain.  Patient follows at the pain clinic.  Patient 

states she receives a monthly shot of cortisone that usually helps her pain 

however lately the shot is not providing pain relief for a longer duration like 

it used to.  Patient reports severe lower back pain which she describes as 

debilitating.  Patient states she has not gotten out of bed due to pain.  

Patient denies reinjury.  Patient states the pain radiates down the bilateral 

legs.  She denies leg weakness and numbness.  Denies numbness and tingling in 

the groin and buttock region.  Denies loss of bowel and bladder function.  

Patient states she has been taking Tylenol with no relief.  States anti-

inflammatories make her stomach sick.  States muscle relaxers do not work for 

her pain.





- Related Data


                                Home Medications











 Medication  Instructions  Recorded  Confirmed


 


Propranolol HCl 80 mg PO DAILY 01/31/22 07/04/22


 


Ziprasidone [Geodon] 80 mg PO BID 01/31/22 07/04/22


 


lamoTRIgine [LaMICtal ODT] 200 mg PO DAILY 04/21/22 07/04/22


 


Atorvastatin [Lipitor] 10 mg PO HS 07/04/22 07/04/22


 


Benztropine Mesylate [Cogentin] 1 mg PO TID 07/04/22 07/04/22


 


Levothyroxine Sodium [Synthroid] 50 mcg PO DAILY 07/04/22 07/04/22


 


hydrOXYzine pamoate [Vistaril] 50 mg PO HS 07/04/22 07/04/22








                                  Previous Rx's











 Medication  Instructions  Recorded


 


amLODIPine [Norvasc] 10 mg PO DAILY 30 Days  tab 06/15/20


 


Lidocaine 5% Patch [Lidoderm 5% 1 patch TOPICAL DAILY 7 Days #7 07/04/22





Patch] patch 











                                    Allergies











Allergy/AdvReac Type Severity Reaction Status Date / Time


 


No Known Allergies Allergy   Verified 07/04/22 11:58














Review of Systems


ROS Statement: 


Those systems with pertinent positive or pertinent negative responses have been 

documented in the HPI.





ROS Other: All systems not noted in ROS Statement are negative.





Past Medical History


Past Medical History: Eye Disorder, Hyperlipidemia, Hypertension, Memory 

Impairment, Thyroid Disorder


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 

back problems.


History of Any Multi-Drug Resistant Organisms: None Reported


Past Surgical History: Tubal Ligation


Past Anesthesia/Blood Transfusion Reactions: No Reported Reaction


Past Psychological History: Anxiety, Bipolar, Depression, Schizophrenia


Smoking Status: Current every day smoker


Past Alcohol Use History: None Reported


Past Drug Use History: None Reported





- Past Family History


  ** Father


Family Medical History: Unable to Obtain, Diabetes Mellitus





  ** Mother


Family Medical History: Eye Disorder, Hypertension


Additional Family Medical History / Comment(s): Mother is alive at age 85 with 

history of temporal arteritis, peripheral vascular disease status post carotid 

surgery, blindness in the left eye.





  ** Sister(s)


Additional Family Medical History / Comment(s): Patient has 4 sisters with no 

major medical problems.  Patient has 3 daughters with no major medical problems.





General Exam


Limitations: no limitations


General appearance: alert, in no apparent distress


Respiratory exam: Present: normal lung sounds bilaterally.  Absent: respiratory 

distress, wheezes, rales, rhonchi, stridor


Cardiovascular Exam: Present: regular rate, normal rhythm, normal heart sounds. 

Absent: systolic murmur, diastolic murmur, rubs, gallop, clicks


Back exam: Present: paraspinal tenderness (bilatera.)





Course


                                   Vital Signs











  07/04/22 07/04/22





  10:41 12:10


 


Temperature 97.9 F 


 


Pulse Rate 102 H 102 H


 


Respiratory 18 18





Rate  


 


Blood Pressure 132/71 139/67


 


O2 Sat by Pulse 96 94 L





Oximetry  














Medical Decision Making





- Medical Decision Making


This is a 66-year-old female with chronic lumbar back pain who presents with 

with severe lower back pain.  Thorough history and examination were performed.  

Patient denies reinjury.  No saddle anesthesia, leg weakness, or urinary/bowel 

incontinence. Patient had a recent lumbar x-ray on June 26 which showed mild 

degenerative first-degree L4 to L5 spondylolisthesis without acute abnormality. 

Without reinjury or new symptoms imaging is not warranted today.








Pain management options are limited.  Pain controlled with Dilaudid.  Patient 

has never tried a lidocaine patch for pain.  A patch was placed over the lumbar 

spine in the emergency department.  Patient will be discharged with Tylenol 3 

starter pack and lidocaine patches.  She is instructed to follow closely with 

her primary care provider.  She will follow up at her scheduled appointment with

the pain clinic this week.  Return parameters discussed.  Patient verbalizes 

understanding and is agreeable to this plan.








Dr. Prieto is my attending. 

















Disposition


Clinical Impression: 


 Spondylolisthesis at L4-L5 level, Mechanical back pain





Disposition: HOME SELF-CARE


Condition: Good


Instructions (If sedation given, give patient instructions):  Acute Low Back 

Pain (ED)


Additional Instructions: 


Take medication as directed.  Apply lidocaine patches as instructed.  Follow up 

with her primary care provider in one to 2 days.  Follow-up at your scheduled 

appointment with the pain clinic this week.  Return to the emergency department 

if you experience new, concerning, or worsening symptoms.


Prescriptions: 


Lidocaine 5% Patch [Lidoderm 5% Patch] 1 patch TOPICAL DAILY 7 Days #7 patch


Is patient prescribed a controlled substance at d/c from ED?: Yes


Referrals: 


GITA oH III, MD [Primary Care Provider] - 1-2 days


Time of Disposition: 11:59

## 2022-07-06 ENCOUNTER — HOSPITAL ENCOUNTER (EMERGENCY)
Dept: HOSPITAL 47 - EC | Age: 67
Discharge: HOME | End: 2022-07-06
Payer: MEDICARE

## 2022-07-06 VITALS
HEART RATE: 104 BPM | DIASTOLIC BLOOD PRESSURE: 79 MMHG | RESPIRATION RATE: 18 BRPM | TEMPERATURE: 98 F | SYSTOLIC BLOOD PRESSURE: 140 MMHG

## 2022-07-06 DIAGNOSIS — E07.9: ICD-10-CM

## 2022-07-06 DIAGNOSIS — E78.5: ICD-10-CM

## 2022-07-06 DIAGNOSIS — F41.9: ICD-10-CM

## 2022-07-06 DIAGNOSIS — E66.9: ICD-10-CM

## 2022-07-06 DIAGNOSIS — F31.9: ICD-10-CM

## 2022-07-06 DIAGNOSIS — F17.200: ICD-10-CM

## 2022-07-06 DIAGNOSIS — M54.41: Primary | ICD-10-CM

## 2022-07-06 DIAGNOSIS — Z79.899: ICD-10-CM

## 2022-07-06 DIAGNOSIS — Z79.890: ICD-10-CM

## 2022-07-06 DIAGNOSIS — F20.9: ICD-10-CM

## 2022-07-06 DIAGNOSIS — G89.29: ICD-10-CM

## 2022-07-06 DIAGNOSIS — M54.42: ICD-10-CM

## 2022-07-06 DIAGNOSIS — I10: ICD-10-CM

## 2022-07-06 PROCEDURE — 96372 THER/PROPH/DIAG INJ SC/IM: CPT

## 2022-07-06 PROCEDURE — 99283 EMERGENCY DEPT VISIT LOW MDM: CPT

## 2022-07-06 NOTE — ED
Back Pain [facility]





- General


Chief Complaint: Back Pain/Injury


Stated Complaint: Back/Leg pain here 7/4


Time Seen by Provider: 07/06/22 10:47


Source: patient


Limitations: no limitations





- History of Present Illness


Initial Comments: 


Patient is a 66-year-old  female presents the emergency room today with

complaints of severe lower back pain with radiculopathy into bilateral lower 

extremities. She reports that her pain intensified since leaving the emergency 

room 2 days ago. She has presented to the emergency room 3 times the last 2 

weeks regarding similar symptoms. She has no chronic lower back pain and was 

previously following with orthopedist who encouraged her to follow-up with a 

spinal specialist however she has not followed up with a spinal specialist. She 

reports that she attempted to contact her primary care provider in regards for 

other analgesic needs however she states that they did not call her back to 

schedule an appointment. She does follow with pain management for lumbar 

injections and has an appointment tomorrow for an injection. She denies any new 

trauma, weakness, focal neurological deficits, bowel or bladder incontinence, or

saddle paresthesia. In addition to her chronic back pain she has a past medical 

history significant for hypertension, hyperlipidemia, hypothyroidism and legally

blind in left eye.





- Related Data


                                Home Medications











 Medication  Instructions  Recorded  Confirmed


 


Propranolol HCl 80 mg PO DAILY 01/31/22 07/05/22


 


Ziprasidone [Geodon] 80 mg PO BID 01/31/22 07/05/22


 


lamoTRIgine [LaMICtal ODT] 200 mg PO DAILY 04/21/22 07/05/22


 


Atorvastatin [Lipitor] 10 mg PO HS 07/04/22 07/05/22


 


Benztropine Mesylate [Cogentin] 1 mg PO TID 07/04/22 07/05/22


 


Levothyroxine Sodium [Synthroid] 50 mcg PO DAILY 07/04/22 07/05/22


 


hydrOXYzine pamoate [Vistaril] 50 mg PO HS 07/04/22 07/05/22








                                  Previous Rx's











 Medication  Instructions  Recorded


 


amLODIPine [Norvasc] 10 mg PO DAILY 30 Days  tab 06/15/20


 


Lidocaine 5% Patch [Lidoderm 5% 1 patch TOPICAL DAILY 7 Days #7 07/04/22





Patch] patch 











                                    Allergies











Allergy/AdvReac Type Severity Reaction Status Date / Time


 


No Known Allergies Allergy   Verified 07/06/22 10:28














Review of Systems


ROS Statement: 


Those systems with pertinent positive or pertinent negative responses have been 

documented in the HPI.





ROS Other: All systems not noted in ROS Statement are negative.





Past Medical History


Past Medical History: Eye Disorder, Hyperlipidemia, Hypertension, 

Musculoskeletal Disorder, Thyroid Disorder


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 

back problems.


History of Any Multi-Drug Resistant Organisms: None Reported


Past Surgical History: Tubal Ligation


Past Anesthesia/Blood Transfusion Reactions: No Reported Reaction


Past Psychological History: Anxiety, Bipolar, Depression, Schizophrenia


Smoking Status: Current every day smoker


Past Alcohol Use History: None Reported


Past Drug Use History: None Reported





- Past Family History


  ** Father


Family Medical History: Unable to Obtain, Diabetes Mellitus





  ** Mother


Family Medical History: Eye Disorder, Hypertension


Additional Family Medical History / Comment(s): Mother is alive at age 85 with 

history of temporal arteritis, peripheral vascular disease status post carotid 

surgery, blindness in the left eye.





  ** Sister(s)


Additional Family Medical History / Comment(s): Patient has 4 sisters with no 

major medical problems.  Patient has 3 daughters with no major medical problems.





General Exam


Limitations: no limitations


General appearance: alert, in no apparent distress, obese


Head exam: Present: atraumatic, normocephalic, normal inspection


Eye exam: Present: normal appearance, PERRL, EOMI.  Absent: scleral icterus, 

conjunctival injection, periorbital swelling


ENT exam: Present: normal exam, TM's normal bilaterally


Neck exam: Present: normal inspection


Respiratory exam: Absent: respiratory distress, accessory muscle use


Extremities exam: Present: normal inspection, calf tenderness.  Absent: pedal 

edema, joint swelling


Back exam: Absent: paraspinal tenderness, vertebral tenderness


Neurological exam: Present: alert, oriented X3, CN II-XII intact


Psychiatric exam: Present: normal affect, normal mood


Skin exam: Present: warm, dry, intact, normal color.  Absent: rash





Course


                                   Vital Signs











  07/06/22





  10:25


 


Temperature 98 F


 


Pulse Rate 104 H


 


Respiratory 18





Rate 


 


Blood Pressure 140/79


 


O2 Sat by Pulse 96





Oximetry 














Medical Decision Making





- Medical Decision Making


No trauma, injury or changes in radiculopathy. No focal neurologic deficits or 

concerns for cauda equine. No need for further diagnostic testing or laboratory 

studies at this time. 





Long discussion with patient and family member at bedside regarding need for 

follow-up with spinal specialist, pain management and her primary care provider 

to establish pain management control plan as utilization of the emergency room 

for chronic pain management is not recommended. 


Patient responded well to dose of IM Dilaudid at her last visit in the emergency

room on 07/04/2022. She has been provided 2 Tylenol#3 starter packs over the 

last 3 weeks will not provide further Tylenol#3 starter packs.





Case discussed with Dr. Joseph.





Disposition


Clinical Impression: 


 Chronic low back pain, Chronic low back pain with bilateral sciatica





Disposition: HOME SELF-CARE


Condition: Stable


Instructions (If sedation given, give patient instructions):  Chronic Back Pain 

(DC)


Additional Instructions: 


Please follow-up with your primary care provider, pain management and 

orthopedist to develop a chronic pain management treatment plan. Avoid bedrest 

and heavy lifting. Utilize Tylenol and ibuprofen over-the-counter for pain. P

lease return to the Emergency Department if symptoms worsen or any other 

concerns.


Is patient prescribed a controlled substance at d/c from ED?: No


Referrals: 


GITA Ho III, MD [Primary Care Provider] - 1-2 days


Brian Carpenter MD [STAFF PHYSICIAN] - 07/07/22


Time of Disposition: 11:11

## 2022-07-07 ENCOUNTER — HOSPITAL ENCOUNTER (OUTPATIENT)
Dept: HOSPITAL 47 - ORPAIN | Age: 67
Discharge: HOME | End: 2022-07-07
Attending: ANESTHESIOLOGY
Payer: MEDICARE

## 2022-07-07 VITALS — HEART RATE: 86 BPM | DIASTOLIC BLOOD PRESSURE: 69 MMHG | RESPIRATION RATE: 16 BRPM | SYSTOLIC BLOOD PRESSURE: 123 MMHG

## 2022-07-07 VITALS — BODY MASS INDEX: 42.7 KG/M2

## 2022-07-07 VITALS — TEMPERATURE: 96.6 F

## 2022-07-07 DIAGNOSIS — Z79.890: ICD-10-CM

## 2022-07-07 DIAGNOSIS — Z91.09: ICD-10-CM

## 2022-07-07 DIAGNOSIS — F17.200: ICD-10-CM

## 2022-07-07 DIAGNOSIS — Z88.6: ICD-10-CM

## 2022-07-07 DIAGNOSIS — M51.36: Primary | ICD-10-CM

## 2022-07-07 DIAGNOSIS — Z79.899: ICD-10-CM

## 2022-07-07 PROCEDURE — 62323 NJX INTERLAMINAR LMBR/SAC: CPT

## 2022-07-07 RX ADMIN — POTASSIUM CHLORIDE SCH MLS: 14.9 INJECTION, SOLUTION INTRAVENOUS at 13:17

## 2022-07-07 RX ADMIN — POTASSIUM CHLORIDE SCH MLS: 14.9 INJECTION, SOLUTION INTRAVENOUS at 12:50

## 2022-07-07 NOTE — P.PCN
Date of Procedure: 07/07/22


Description of Procedure: 


Procedure: 


1.  L4-L5 Epidural steroid injection under fluoroscopic guidance, 


2.   Lumbar epidurogram





PREOPERATIVE DIAGNOSIS:  Lumbar degenerative disc disease, and Lumbar 

radiculopathy.





POSTOPERATIVE DIAGNOSIS: Lumbar degenerative disc disease, and Lumbar radicu

lopathy.





SURGEON: Mundo Lopez 





ANESTHESIA:  Local with 1% lidocaine, and IV sedation: Midazolam 2 mg, and 

fentanyl 100 g





EBL: None.





Specimen removed: None





Fluoroscopic image: saved to electronic medical records





PROCEDURE INDICATION:  The patient had history of Lumbar degenerative disc 

disease and Lumbar radiculopathy. Failed to conservative therapy. Presented for 

epidural steroid injection.





PROCEDURE DESCRIPTION: The patient was seen and identified in the preoperative 

area. Risks, benefits, complications, and alternatives were discussed with the 

patient. The patient agreed to proceed with the procedure and signed the 

consent. IV was started, and vital signs were stable.





Patient was taken to the procedure area, and time out was completed. The patient

was placed in the prone position on procedure table and a pillow was placed 

under the abdomen to reduce lumbar lordosis. The lumbosacral area was prepped 

and draped in the usual sterile fashion. Critical pause was taken. Vital signs 

were closely monitored during the procedure.





Using anterior-posterior fluoroscopy, the L4-L5 interlaminar space was 

identified, and skin and deeper tissues were localized with 1% lidocaine. Using 

anterior-posterior fluoroscopy, lateral fluoroscopy, and loss-of-resistance 

technique, a 20 gauge 3.5 Tuohy epidural needle entered the epidural space. 

After negative aspiration of CSF and blood with no paresthesias, 1 ml of 

[id number] contrast dye was injected and an excellent epidurogram was seen. Again

after negative aspiration of CSF and blood with no paresthesias, 10 mL of block 

solution was injected into the epidural space. Block solution contained 80 mg of

Depo-Medrol, and 9 mL of preservative-free normal saline. Needle was withdrawn 

intact, skin was cleansed, and bandages were applied. 





COMPLICATIONS: None.





DISPOSITION / PLANS: The patient was placed in a supine position and transferred

to the recovery area in a stable condition for observation. Patient was 

discharged from the recovery room after meeting discharge criteria. Home 

discharge instructions given to the patient by the staff. The patient was 

reexamined prior to discharge. The patient will schedule a follow up in the 

clinic in 4 weeks.

## 2022-07-07 NOTE — FL
Fluoroscopy

 

HISTORY: Pain

 

7 seconds fluoroscopy time supplied to the referring clinician.  2 intraoperative C-arm images docume
nt the procedure. See dictated report from anesthesia.

## 2022-07-29 ENCOUNTER — HOSPITAL ENCOUNTER (EMERGENCY)
Dept: HOSPITAL 47 - EC | Age: 67
Discharge: HOME | End: 2022-07-29
Payer: MEDICARE

## 2022-07-29 VITALS
HEART RATE: 93 BPM | TEMPERATURE: 98.4 F | SYSTOLIC BLOOD PRESSURE: 141 MMHG | DIASTOLIC BLOOD PRESSURE: 68 MMHG | RESPIRATION RATE: 18 BRPM

## 2022-07-29 DIAGNOSIS — M54.16: Primary | ICD-10-CM

## 2022-07-29 DIAGNOSIS — F17.200: ICD-10-CM

## 2022-07-29 DIAGNOSIS — I10: ICD-10-CM

## 2022-07-29 DIAGNOSIS — E78.5: ICD-10-CM

## 2022-07-29 PROCEDURE — 99283 EMERGENCY DEPT VISIT LOW MDM: CPT

## 2022-07-29 PROCEDURE — 96372 THER/PROPH/DIAG INJ SC/IM: CPT

## 2022-07-29 NOTE — ED
Back Pain [facility]





- General


Chief Complaint: Back Pain/Injury


Stated Complaint: back pain - leg issues


Time Seen by Provider: 07/29/22 18:32


Source: patient, RN notes reviewed


Limitations: no limitations





- History of Present Illness


Initial Comments: 





This is a pleasant 66-year-old female with history of multiple medical issues as

listed in the chart.  Patient presents with recurrent low back pain and 

intermittent radiculopathy to both legs.  No grounds a bowel movement she 

urination.  No fever or chills.  Patient has been seen here 4 times since June 26.  She had plain film x-rays done that day.  Subsequently, she was evaluated 

by Dr. Carr and [facility]. [facility].  Patient had corticosteroid 

injections into her back.  She states she's had 2 rounds.  Last time was last 

week.  These don't seem to be working.  Patient is not taking any other 

medication at home for pain.  Patient previously came here for a shot of pain 

medication and a Tylenol 3 starter pack.  Patient requesting this.





Patient is able to ambulate with increased pain.  She states moving the lumbar 

spine exacerbates the pain.  She's had no falls, no trauma, no recent surgeries.

 No immunosuppression.





No urinary retention or incontinence.  No constipation or bowel incontinence.  

No current radiculopathy.





No headache, no fever or chills, no changes in vision or hearing, no sore throat

or difficulty with speech, no neck pain, no chest pain or shortness of breath, 

no abdominal pain, no nausea or vomiting, no changes in urination or bowel 

movements, no numbness or tingling, , no skin rashes or lesions.





Past medical, surgical, social, and family history reviewed.


MD Complaint: back pain





- Related Data


                                Home Medications











 Medication  Instructions  Recorded  Confirmed


 


Propranolol HCl 80 mg PO DAILY 01/31/22 07/07/22


 


Ziprasidone [Geodon] 80 mg PO BID 01/31/22 07/07/22


 


lamoTRIgine [LaMICtal ODT] 200 mg PO DAILY 04/21/22 07/07/22


 


Atorvastatin [Lipitor] 10 mg PO HS 07/04/22 07/07/22


 


Benztropine Mesylate [Cogentin] 1 mg PO TID 07/04/22 07/07/22


 


Levothyroxine Sodium [Synthroid] 50 mcg PO DAILY 07/04/22 07/07/22


 


hydrOXYzine pamoate [Vistaril] 50 mg PO HS 07/04/22 07/07/22








                                  Previous Rx's











 Medication  Instructions  Recorded


 


amLODIPine [Norvasc] 10 mg PO DAILY 30 Days  tab 06/15/20


 


Lidocaine 5% Patch [Lidoderm 5% 1 patch TOPICAL DAILY 7 Days #7 07/04/22





Patch] patch 


 


Ibuprofen [Motrin] 600 mg PO Q8HR PRN 10 Days #30 tab 07/06/22











                                    Allergies











Allergy/AdvReac Type Severity Reaction Status Date / Time


 


No Known Allergies Allergy   Verified 07/29/22 18:30














Review of Systems


ROS Statement: 


Those systems with pertinent positive or pertinent negative responses have been 

documented in the HPI.





ROS Other: All systems not noted in ROS Statement are negative.





Past Medical History


Past Medical History: Eye Disorder, Hyperlipidemia, Hypertension, 

Musculoskeletal Disorder, Thyroid Disorder


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 

back problems.


History of Any Multi-Drug Resistant Organisms: None Reported


Past Surgical History: Tubal Ligation


Past Anesthesia/Blood Transfusion Reactions: No Reported Reaction


Past Psychological History: Anxiety, Bipolar, Depression, Schizophrenia


Smoking Status: Current every day smoker


Past Alcohol Use History: None Reported


Past Drug Use History: None Reported





- Past Family History


  ** Father


Family Medical History: Unable to Obtain, Diabetes Mellitus





  ** Mother


Family Medical History: Eye Disorder, Hypertension


Additional Family Medical History / Comment(s): Mother is alive at age 85 with 

history of temporal arteritis, peripheral vascular disease status post carotid 

surgery, blindness in the left eye.





  ** Sister(s)


Additional Family Medical History / Comment(s): Patient has 4 sisters with no 

major medical problems.  Patient has 3 daughters with no major medical problems.





General Exam





- General Exam Comments


Initial Comments: 





Deconditioned appearing elderly female in no significant distress at the time 

I'm seeing her.  Does not appear to be ill or toxic.


Limitations: no limitations


General appearance: alert, in no apparent distress, obese


Head exam: Present: atraumatic, normocephalic, normal inspection


Eye exam: Present: normal appearance, PERRL, EOMI.  Absent: scleral icterus, 

conjunctival injection, periorbital swelling


ENT exam: Present: normal exam, mucous membranes moist


Neck exam: Present: normal inspection, full ROM.  Absent: tenderness, 

meningismus, lymphadenopathy


Respiratory exam: Present: normal lung sounds bilaterally.  Absent: respiratory 

distress, wheezes, rales, rhonchi, stridor


Cardiovascular Exam: Present: regular rate, normal rhythm, normal heart sounds. 

 Absent: systolic murmur, diastolic murmur, rubs, gallop, clicks


GI/Abdominal exam: Present: soft, normal bowel sounds.  Absent: distended, 

tenderness, guarding, rebound, rigid


Extremities exam: Present: normal inspection, full ROM, normal capillary refill.

  Absent: tenderness, pedal edema, joint swelling, calf tenderness


Back exam: Present: normal inspection, full ROM (With discomfort), tenderness 

(Lumbar paraspinal), paraspinal tenderness, other (No erythema, no break in skin

 integrity).  Absent: CVA tenderness (R), CVA tenderness (L), muscle spasm, 

vertebral tenderness, rash noted


Neurological exam: Present: alert, oriented X3, CN II-XII intact, normal gait 

(Guarded but normal), reflexes normal, other (Straight leg raise negative 

bilaterally, great toe and sensory strength is normal).  Absent: altered, motor 

sensory deficit


Psychiatric exam: Present: normal affect, normal mood


Skin exam: Present: warm, dry, intact, normal color.  Absent: rash





Course


                                   Vital Signs











  07/29/22





  18:27


 


Temperature 98.4 F


 


Pulse Rate 93


 


Respiratory 18





Rate 


 


Blood Pressure 141/68


 


O2 Sat by Pulse 96





Oximetry 














Medical Decision Making





- Medical Decision Making





-There are no red flags for concerning back pathology. Specifically: 


-No history of cancer, this is not a mass effect, MRI not indicated. 


-No anticoagulation, this is not a bleed. 


-No fevers, no IVDU, this is not an infectious process. 


-No trauma, no bony pain, x-rays are not indicated. 


-With a normal neuro exam, and no urinary or bowel retention or incontinence, 

there is no clinical sign of motor defect or cauda equina - MRI is not indicated

 at this point.


-No pulsating abdominal mass or risk factors for AAA.


-Pain is relieved with rest, which is also less concerning.


-I do not believe that x-rays or emergent MRI is indicated at this time. 


-We will treat symptomatically and discharge home with follow up instructions. 


-Stretching/strengthening exercise given to patient and they will be referred to

 physical therapy


-Patient is instructed to use over-the-counter analgesics as directed on 

packaging for pain.








Patient informed that she needs to receive any further pain management from her 

pain management doctor.  Patient's also go forth with the appointment with the 

neurologist.  Alternatively, [facility] with [facility].





Patient voiced understanding.  Patient given 1 dose of Dilaudid here and a 

Tylenol with codeine starter pack.





Patient was told to return to the ER for any signs or symptoms worsen.  Told to 

return immediately if any other problems arise.  All questions answered.  

Treatment plan discussed.  Patient in agreement


Every effort has been made to ensure accuracy of this dictation.  However, due 

to the limitations of electronic medical records and dictation devices, errors 

in charting still occur.





Supervisor Dr. Servin





Disposition


Clinical Impression: 


 Low back pain, Lumbar radiculopathy





Narrative: 





Recurrent low back pain with recurrent lumbar radiculopathy.  No evidence of 

cauda equina syndrome


Disposition: HOME SELF-CARE


Condition: Stable


Instructions (If sedation given, give patient instructions):  Back Pain (ED)


Additional Instructions: 


Follow-up with your regular physician and your pain management specialist as 

discussed.  You should also continue with the follow-up appointment with the 

neurologist.





Follow-up with your regular physician as directed.  Return to the ER immediately

 if any symptoms worsen, new symptoms arise, or any other problems develop.


Is patient prescribed a controlled substance at d/c from ED?: No


Referrals: 


GITA Ho III, MD [Primary Care Provider] - 08/01/22


Hari Goddard MD [Medical Doctor] - 08/01/22


Time of Disposition: 18:43

## 2022-07-31 ENCOUNTER — HOSPITAL ENCOUNTER (EMERGENCY)
Dept: HOSPITAL 47 - EC | Age: 67
Discharge: HOME | End: 2022-07-31
Payer: MEDICARE

## 2022-07-31 VITALS
RESPIRATION RATE: 18 BRPM | TEMPERATURE: 98.3 F | DIASTOLIC BLOOD PRESSURE: 69 MMHG | SYSTOLIC BLOOD PRESSURE: 123 MMHG | HEART RATE: 101 BPM

## 2022-07-31 DIAGNOSIS — E07.9: ICD-10-CM

## 2022-07-31 DIAGNOSIS — I10: ICD-10-CM

## 2022-07-31 DIAGNOSIS — Z79.899: ICD-10-CM

## 2022-07-31 DIAGNOSIS — M54.16: Primary | ICD-10-CM

## 2022-07-31 DIAGNOSIS — E78.5: ICD-10-CM

## 2022-07-31 PROCEDURE — 99283 EMERGENCY DEPT VISIT LOW MDM: CPT

## 2022-07-31 PROCEDURE — 96372 THER/PROPH/DIAG INJ SC/IM: CPT

## 2022-07-31 RX ADMIN — LIDOCAINE STA: 50 PATCH TOPICAL at 11:37

## 2022-07-31 RX ADMIN — LIDOCAINE STA PATCH: 50 PATCH TOPICAL at 11:36

## 2022-07-31 NOTE — ED
Back Pain [facility]PI





- General


Chief Complaint: Back Pain/Injury


Stated Complaint: revisit- leg/back pain


Time Seen by Provider: 07/31/22 11:02


Source: patient, family


Limitations: no limitations





- History of Present Illness


Initial Comments: 


Patient is a 66-year-old female presenting with chief complaint of back pain.  

Patient has been seen here multiple times for this issue.  She states it is low 

back pain that shoots down the bilateral legs.  She denies any loss of bowel or 

bladder control or saddle paresthesia.  She denies any trauma or injury.  

Patient states she is unable to get in to see Dr. Goddard until 8/8/22.  Patient

states that Tylenol 3 helps her pain at home, however she is out.  She denies 

any abdominal pain, chest pain, shortness of breath, fever, chills, nausea, 

vomiting, numbness, tingling, weakness, dysuria, hematuria, urgency, frequency, 

hematochezia, melena.








- Related Data


                                Home Medications











 Medication  Instructions  Recorded  Confirmed


 


Propranolol HCl 80 mg PO DAILY 01/31/22 07/07/22


 


Ziprasidone [Geodon] 80 mg PO BID 01/31/22 07/07/22


 


lamoTRIgine [LaMICtal ODT] 200 mg PO DAILY 04/21/22 07/07/22


 


Atorvastatin [Lipitor] 10 mg PO HS 07/04/22 07/07/22


 


Benztropine Mesylate [Cogentin] 1 mg PO TID 07/04/22 07/07/22


 


Levothyroxine Sodium [Synthroid] 50 mcg PO DAILY 07/04/22 07/07/22


 


hydrOXYzine pamoate [Vistaril] 50 mg PO HS 07/04/22 07/07/22








                                  Previous Rx's











 Medication  Instructions  Recorded


 


amLODIPine [Norvasc] 10 mg PO DAILY 30 Days  tab 06/15/20


 


Lidocaine 5% Patch [Lidoderm 5% 1 patch TOPICAL DAILY 7 Days #7 07/04/22





Patch] patch 


 


Ibuprofen [Motrin] 600 mg PO Q8HR PRN 10 Days #30 tab 07/06/22


 


methylPREDNISolone Dose Pack 4 mg PO AS DIRECTED #1 packet 07/31/22





[Medrol Dose Pack]  











                                    Allergies











Allergy/AdvReac Type Severity Reaction Status Date / Time


 


No Known Allergies Allergy   Verified 07/31/22 10:47














Review of Systems


ROS Statement: 


Those systems with pertinent positive or pertinent negative responses have been 

documented in the HPI.





ROS Other: All systems not noted in ROS Statement are negative.





Past Medical History


Past Medical History: Eye Disorder, Hyperlipidemia, Hypertension, 

Musculoskeletal Disorder, Thyroid Disorder


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 

back problems.


History of Any Multi-Drug Resistant Organisms: None Reported


Past Surgical History: Tubal Ligation


Past Anesthesia/Blood Transfusion Reactions: No Reported Reaction


Past Psychological History: Anxiety, Bipolar, Depression, Schizophrenia


Smoking Status: Current every day smoker


Past Alcohol Use History: None Reported


Past Drug Use History: None Reported





- Past Family History


  ** Father


Family Medical History: Unable to Obtain, Diabetes Mellitus





  ** Mother


Family Medical History: Eye Disorder, Hypertension


Additional Family Medical History / Comment(s): Mother is alive at age 85 with 

history of temporal arteritis, peripheral vascular disease status post carotid 

surgery, blindness in the left eye.





  ** Sister(s)


Additional Family Medical History / Comment(s): Patient has 4 sisters with no 

major medical problems.  Patient has 3 daughters with no major medical problems.





General Exam


Limitations: no limitations


General appearance: alert, in no apparent distress


Head exam: Present: atraumatic, normocephalic, normal inspection


Eye exam: Present: normal appearance, EOMI.  Absent: scleral icterus, 

periorbital swelling


Neck exam: Present: normal inspection


Respiratory exam: Present: normal lung sounds bilaterally.  Absent: respiratory 

distress, wheezes, rales, rhonchi, stridor


Cardiovascular Exam: Present: regular rate, normal rhythm, normal heart sounds. 

Absent: systolic murmur, diastolic murmur, rubs, gallop, clicks


Extremities exam: Present: normal inspection


Back exam: Present: normal inspection, full ROM (With pain), paraspinal 

tenderness.  Absent: CVA tenderness (R), CVA tenderness (L), vertebral 

tenderness


Neurological exam: Present: alert, oriented X3, CN II-XII intact


Psychiatric exam: Present: normal affect, normal mood


Skin exam: Present: warm, dry, intact, normal color.  Absent: rash





Course


                                   Vital Signs











  07/31/22





  10:44


 


Temperature 98.3 F


 


Pulse Rate 101 H


 


Respiratory 18





Rate 


 


Blood Pressure 123/69


 


O2 Sat by Pulse 94 L





Oximetry 














Medical Decision Making





- Medical Decision Making


Patient is a 66-year-old female presenting with chief complaint of back pain.  

Patient has chronic lower back pain and states she is running out of Tylenol 3 

for pain management.  She has a follow-up appointment with her orthopedist 

scheduled.  On examination there is no loss of bowel or bladder control, no 

saddle paresthesia.  There is there is paraspinal muscle tenderness and pain 

with range of motion.  No fever, chills, dysuria, hematuria.  Patient states 

this is consistent with her regular chronic back pain.  Patient's pain is 

controlled with medication here in the ER.  Provided with Tylenol 3 starter pack

and Medrol Dosepak.  Report back to ER with any new or worsening symptoms.  

Discussed return parameters answered all questions.  Follow-up with orthopedist 

at scheduled appointment.  Patient conveyed verbal understanding and agreed to 

the plan.  I discussed this case with my attending Dr. Servin.








Disposition


Clinical Impression: 


 Radiculopathy, Lumbar back pain





Disposition: HOME SELF-CARE


Condition: Good


Instructions (If sedation given, give patient instructions):  Chronic Back Pain 

(DC)


Additional Instructions: 


Follow-up with PCP and orthopedist.  Report back to ER with any new or worsening

symptoms.  Take medication as prescribed.


Prescriptions: 


methylPREDNISolone Dose Pack [Medrol Dose Pack] 4 mg PO AS DIRECTED #1 packet


Is patient prescribed a controlled substance at d/c from ED?: Yes


When asked, does pt state using other controlled substances?: No


If prescribed controlled substance>3 days was MAPS reviewed?: Prescribed <3 Days


Referrals: 


GITA Ho III, MD [Primary Care Provider] - 1-2 days


Time of Disposition: 12:15

## 2022-08-07 ENCOUNTER — HOSPITAL ENCOUNTER (EMERGENCY)
Dept: HOSPITAL 47 - EC | Age: 67
Discharge: HOME | End: 2022-08-07
Payer: MEDICARE

## 2022-08-07 VITALS
HEART RATE: 102 BPM | TEMPERATURE: 98.1 F | SYSTOLIC BLOOD PRESSURE: 141 MMHG | RESPIRATION RATE: 18 BRPM | DIASTOLIC BLOOD PRESSURE: 64 MMHG

## 2022-08-07 DIAGNOSIS — I10: ICD-10-CM

## 2022-08-07 DIAGNOSIS — G89.29: Primary | ICD-10-CM

## 2022-08-07 DIAGNOSIS — F17.200: ICD-10-CM

## 2022-08-07 DIAGNOSIS — M54.50: ICD-10-CM

## 2022-08-07 DIAGNOSIS — E78.5: ICD-10-CM

## 2022-08-07 PROCEDURE — 96372 THER/PROPH/DIAG INJ SC/IM: CPT

## 2022-08-07 PROCEDURE — 99283 EMERGENCY DEPT VISIT LOW MDM: CPT

## 2022-08-07 NOTE — ED
Back Pain [facility]





- General


Chief Complaint: Back Pain/Injury


Stated Complaint: trouble walking


Time Seen by Provider: 08/07/22 09:00


Source: patient, RN notes reviewed, old records reviewed


Limitations: no limitations





- History of Present Illness


Initial Comments: 





66-year-old female presents ambulatory with complaints of chronic low back pain.

 Patient states that she has been seeing doctors at the pain clinic who have 

provided her with a spinal injection in the beginning of July with no relief.  

She states that she is scheduled to see her neurologist tomorrow for the first 

time.  She states normally she comes to the emergency room and will get a shot 

of Dilaudid and some Tylenol threes for her pain.  She denies any fevers, no 

bowel or bladder incontinence.  She is able to bear weight.  This pain is 

similar to the previous pain that she's had for several years.  She has not had 

any back surgeries.


MD Complaint: back pain


-: year(s)


Similar Symptoms Previously: Yes


Severity scale (1-10): 10


Quality: other (shooting "bones rubbing")


Consistency: constant


Improves With: none


Worsens With: movement


Associated Symptoms: denies other symptoms





- Related Data


                                Home Medications











 Medication  Instructions  Recorded  Confirmed


 


Propranolol HCl 80 mg PO DAILY 01/31/22 07/07/22


 


Ziprasidone [Geodon] 80 mg PO BID 01/31/22 07/07/22


 


lamoTRIgine [LaMICtal ODT] 200 mg PO DAILY 04/21/22 07/07/22


 


Atorvastatin [Lipitor] 10 mg PO HS 07/04/22 07/07/22


 


Benztropine Mesylate [Cogentin] 1 mg PO TID 07/04/22 07/07/22


 


Levothyroxine Sodium [Synthroid] 50 mcg PO DAILY 07/04/22 07/07/22


 


hydrOXYzine pamoate [Vistaril] 50 mg PO HS 07/04/22 07/07/22








                                  Previous Rx's











 Medication  Instructions  Recorded


 


amLODIPine [Norvasc] 10 mg PO DAILY 30 Days  tab 06/15/20


 


Lidocaine 5% Patch [Lidoderm 5% 1 patch TOPICAL DAILY 7 Days #7 07/04/22





Patch] patch 


 


Ibuprofen [Motrin] 600 mg PO Q8HR PRN 10 Days #30 tab 07/06/22


 


methylPREDNISolone Dose Pack 4 mg PO AS DIRECTED #1 packet 07/31/22





[Medrol Dose Pack]  











                                    Allergies











Allergy/AdvReac Type Severity Reaction Status Date / Time


 


No Known Allergies Allergy   Verified 08/07/22 08:57














Review of Systems


ROS Statement: 


Those systems with pertinent positive or pertinent negative responses have been 

documented in the HPI.





ROS Other: All systems not noted in ROS Statement are negative.





Past Medical History


Past Medical History: Eye Disorder, Hyperlipidemia, Hypertension, 

Musculoskeletal Disorder, Thyroid Disorder


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 

back problems.


History of Any Multi-Drug Resistant Organisms: None Reported


Past Surgical History: Tubal Ligation


Past Anesthesia/Blood Transfusion Reactions: No Reported Reaction


Past Psychological History: Anxiety, Bipolar, Depression, Schizophrenia


Smoking Status: Current every day smoker


Past Alcohol Use History: None Reported


Past Drug Use History: None Reported





- Past Family History


  ** Father


Family Medical History: Unable to Obtain, Diabetes Mellitus





  ** Mother


Family Medical History: Eye Disorder, Hypertension


Additional Family Medical History / Comment(s): Mother is alive at age 85 with 

history of temporal arteritis, peripheral vascular disease status post carotid 

surgery, blindness in the left eye.





  ** Sister(s)


Additional Family Medical History / Comment(s): Patient has 4 sisters with no 

major medical problems.  Patient has 3 daughters with no major medical problems.





General Exam


Limitations: no limitations


General appearance: alert, in no apparent distress


Head exam: Present: atraumatic


Eye exam: Present: normal appearance.  Absent: scleral icterus, conjunctival 

injection, periorbital swelling


Neck exam: Present: full ROM


Respiratory exam: Absent: respiratory distress, accessory muscle use


Cardiovascular Exam: Present: tachycardia


GI/Abdominal exam: Present: soft


Extremities exam: Present: normal capillary refill


Back exam: Present: tenderness, paraspinal tenderness (LS-spine)


  ** Expanded


Back exam: Absent: saddle anesthesia


Neurological exam: Present: alert, oriented X3, normal gait


Psychiatric exam: Present: normal affect, normal mood


Skin exam: Present: warm, dry, normal color.  Absent: cyanosis, diaphoretic, 

petechiae, pallor





Course


                                   Vital Signs











  08/07/22





  08:53


 


Temperature 98.1 F


 


Pulse Rate 102 H


 


Respiratory 18





Rate 


 


Blood Pressure 141/64


 


O2 Sat by Pulse 96





Oximetry 














Medical Decision Making





- Medical Decision Making





Patient presents with chronic back pain ongoing for several years. She denies 

any bowel or bladder incontinence no fevers.  Physical exam does not show any 

evidence of abscess.  


She is seeing doctors at the pain management clinic. States she has an 

appointment tomorrow with neurology at 1:30


She is requesting some additional Tylenol threes and a shot of Dilaudid today.


She was given a Tylenol 3 and a shot of Toradol in the ER for pain. I explained 

that prescription narcotics are not indicated for treatment of chronic pain.  Si

nce she is in the care of pain management doctor I recommended that she continue

to follow up with that doctor for her chronic pain.   She'll be discharged and 

directed to keep that appointment tomorrow at 1:30.  She is agreeable to this 

plan of care.


Case discussed with Dr. Motta.





Disposition


Clinical Impression: 


 Chronic back pain





Disposition: HOME SELF-CARE


Condition: Good


Instructions (If sedation given, give patient instructions):  Chronic Back Pain 

(DC)


Additional Instructions: 


Keep your appointment with your neurologist tomorrow at 1:30.  Take Tylenol 

threes as needed for severe pain.  It is not recommended to continue narcotic 

pain medication for chronic pain.  Please follow-up with the pain clinic as well

for continuation of care.  Return to emergency room if any new or concerning 

symptoms including fevers, inability to ambulate, bowel or bladder incontinence.


Is patient prescribed a controlled substance at d/c from ED?: No


Referrals: 


GITA Ho III, MD [Primary Care Provider] - 1-2 days


Time of Disposition: 09:16

## 2022-09-14 ENCOUNTER — HOSPITAL ENCOUNTER (EMERGENCY)
Dept: HOSPITAL 47 - EC | Age: 67
Discharge: HOME | End: 2022-09-14
Payer: MEDICARE

## 2022-09-14 VITALS — DIASTOLIC BLOOD PRESSURE: 65 MMHG | HEART RATE: 88 BPM | SYSTOLIC BLOOD PRESSURE: 132 MMHG

## 2022-09-14 VITALS — TEMPERATURE: 98.3 F | RESPIRATION RATE: 17 BRPM

## 2022-09-14 DIAGNOSIS — G89.29: ICD-10-CM

## 2022-09-14 DIAGNOSIS — E07.9: ICD-10-CM

## 2022-09-14 DIAGNOSIS — M54.9: Primary | ICD-10-CM

## 2022-09-14 DIAGNOSIS — E78.5: ICD-10-CM

## 2022-09-14 DIAGNOSIS — I10: ICD-10-CM

## 2022-09-14 DIAGNOSIS — F41.9: ICD-10-CM

## 2022-09-14 DIAGNOSIS — F12.90: ICD-10-CM

## 2022-09-14 DIAGNOSIS — Z79.899: ICD-10-CM

## 2022-09-14 DIAGNOSIS — F31.9: ICD-10-CM

## 2022-09-14 DIAGNOSIS — Z79.890: ICD-10-CM

## 2022-09-14 PROCEDURE — 96372 THER/PROPH/DIAG INJ SC/IM: CPT

## 2022-09-14 PROCEDURE — 99283 EMERGENCY DEPT VISIT LOW MDM: CPT

## 2022-09-14 NOTE — ED
Back Pain HPI





- General


Chief Complaint: Back Pain/Injury


Stated Complaint: Back pain


Time Seen by Provider: 09/14/22 08:00


Source: patient, EMS


Limitations: no limitations





- History of Present Illness


Initial Comments: 





67-year-old female with long-standing history of chronic back pain, 

hypertension, hyperlipidemia who presents to the emergency department with back 

pain.  She has been seen in the emergency department several times for similar 

complaint.  She has also been underneath the care of Dr. Carrera, Dr. Chatterjee 

and currently Dr. Begum.  She states that she sustained a fall last year and 

afterwards began having worsening back pain.  She had an MRI which demonstrated 

degenerative disc disease, several herniated lumbar disks.  Originally seen by 

Dr. Chatterjee who referred her to Dr. Carrera who did a series of injections.  He

then referred her to Dr. Carr however patient states that he was on vacation 

and therefore she switched over to Dr. Begum in August.  She is in a pain 

contract.  Has Norco at home which she states she doesn't take because it 

doesn't help.  She does have an appointment today at 11 AM for an epidural 

injection however states she couldn't get up to ambulate therefore came into the

hospital.  She denies any bowel or bladder incontinence or retention.  States 

that the location of the pain has been lumbar region radiating into the p

osterior aspect of her lower extremities.  No change in the quality of the pain 

or location.  Denies fevers.  No saddle anesthesia.  No new trauma.  No other 

alleviating, precipitating or modifying factors





- Related Data


                                Home Medications











 Medication  Instructions  Recorded  Confirmed


 


Propranolol HCl 80 mg PO DAILY 01/31/22 07/07/22


 


Ziprasidone [Geodon] 80 mg PO BID 01/31/22 07/07/22


 


lamoTRIgine [LaMICtal ODT] 200 mg PO DAILY 04/21/22 07/07/22


 


Atorvastatin [Lipitor] 10 mg PO HS 07/04/22 07/07/22


 


Benztropine Mesylate [Cogentin] 1 mg PO TID 07/04/22 07/07/22


 


Levothyroxine Sodium [Synthroid] 50 mcg PO DAILY 07/04/22 07/07/22


 


hydrOXYzine pamoate [Vistaril] 50 mg PO HS 07/04/22 07/07/22








                                  Previous Rx's











 Medication  Instructions  Recorded


 


amLODIPine [Norvasc] 10 mg PO DAILY 30 Days  tab 06/15/20


 


Lidocaine 5% Patch [Lidoderm 5% 1 patch TOPICAL DAILY 7 Days #7 07/04/22





Patch] patch 


 


Ibuprofen [Motrin] 600 mg PO Q8HR PRN 10 Days #30 tab 07/06/22


 


methylPREDNISolone Dose Pack 4 mg PO AS DIRECTED #1 packet 07/31/22





[Medrol Dose Pack]  











                                    Allergies











Allergy/AdvReac Type Severity Reaction Status Date / Time


 


No Known Allergies Allergy   Verified 09/15/22 09:30














Review of Systems


ROS Statement: 


Those systems with pertinent positive or pertinent negative responses have been 

documented in the HPI.





ROS Other: All systems not noted in ROS Statement are negative.





Past Medical History


Past Medical History: Eye Disorder, Hyperlipidemia, Hypertension, 

Musculoskeletal Disorder, Thyroid Disorder


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 

back problems.


History of Any Multi-Drug Resistant Organisms: None Reported


Past Surgical History: Tubal Ligation


Past Anesthesia/Blood Transfusion Reactions: No Reported Reaction


Past Psychological History: Anxiety, Bipolar, Depression, Schizophrenia


Smoking Status: Current every day smoker


Past Alcohol Use History: None Reported


Past Drug Use History: None Reported





- Past Family History


  ** Father


Family Medical History: Unable to Obtain, Diabetes Mellitus





  ** Mother


Family Medical History: Eye Disorder, Hypertension


Additional Family Medical History / Comment(s): Mother is alive at age 85 with 

history of temporal arteritis, peripheral vascular disease status post carotid 

surgery, blindness in the left eye.





  ** Sister(s)


Additional Family Medical History / Comment(s): Patient has 4 sisters with no 

major medical problems.  Patient has 3 daughters with no major medical problems.





General Exam


Limitations: no limitations


General appearance: alert, in no apparent distress


Head exam: Present: atraumatic, normocephalic, normal inspection


Eye exam: Present: normal appearance, PERRL, EOMI.  Absent: scleral icterus, 

conjunctival injection, periorbital swelling


ENT exam: Present: normal exam, mucous membranes moist


Neck exam: Present: normal inspection.  Absent: tenderness, meningismus, 

lymphadenopathy


Respiratory exam: Present: normal lung sounds bilaterally.  Absent: respiratory 

distress, wheezes, rales, rhonchi, stridor


Cardiovascular Exam: Present: regular rate, normal rhythm, normal heart sounds. 

Absent: systolic murmur, diastolic murmur, rubs, gallop, clicks


GI/Abdominal exam: Present: soft, normal bowel sounds.  Absent: distended, 

tenderness, guarding, rebound, rigid


Extremities exam: Present: normal inspection, full ROM, normal capillary refill.

 Absent: tenderness, pedal edema, joint swelling, calf tenderness


Back exam: Present: tenderness (paraspinal lumbar tenderness L2-L5. bilateral SI

joint tenderness)


Neurological exam: Present: alert, oriented X3, CN II-XII intact


Psychiatric exam: Present: normal affect, normal mood


Skin exam: Present: warm, dry, intact, normal color.  Absent: rash





Course


                                   Vital Signs











  09/14/22 09/14/22





  08:03 10:18


 


Temperature 98.3 F 


 


Pulse Rate 87 88


 


Respiratory 17 17





Rate  


 


Blood Pressure 143/92 132/65


 


O2 Sat by Pulse 95 94 L





Oximetry  














Medical Decision Making





- Medical Decision Making





Upon arrival patient is placed in room 9.  Her history and physical exam is 

performed.  Pain is consistent with the patient's chronic pain and therefore she

is given Valium and Dilaudid for pain and muscle relaxation.  Patient feels impr

elsa and is able to get up and ambulate.  She will go to her doctor's 

appointment at 11 AM I Dr. Goddard's office.  Instructed to return for any new 

or worsening symptoms.  Patient discharged in stable condition





Disposition


Clinical Impression: 


 Chronic back pain





Disposition: HOME SELF-CARE


Condition: Stable


Instructions (If sedation given, give patient instructions):  Chronic Back Pain 

(DC)


Additional Instructions: 


Please follow-up at your appointment today at 1130 with Dr. Goddard.  I 

recommend you see Dr. Carr or Dr. Vance. Return to the ED for any new or 

worsening symptoms. 


Is patient prescribed a controlled substance at d/c from ED?: No


Referrals: 


GITA Ho III, MD [Primary Care Provider] - 1-2 days


Washington Vance DO [Doctor of Osteopathic Medicine] - 1-2 days


TG Carr DO [Doctor of Osteopathic Medicine] - 1-2 days


Time of Disposition: 09:37

## 2022-09-15 ENCOUNTER — HOSPITAL ENCOUNTER (EMERGENCY)
Dept: HOSPITAL 47 - EC | Age: 67
Discharge: HOME | End: 2022-09-15
Payer: MEDICARE

## 2022-09-15 VITALS — HEART RATE: 76 BPM | RESPIRATION RATE: 16 BRPM | SYSTOLIC BLOOD PRESSURE: 135 MMHG | DIASTOLIC BLOOD PRESSURE: 78 MMHG

## 2022-09-15 VITALS
TEMPERATURE: 98.1 F | HEART RATE: 91 BPM | SYSTOLIC BLOOD PRESSURE: 112 MMHG | RESPIRATION RATE: 20 BRPM | DIASTOLIC BLOOD PRESSURE: 66 MMHG

## 2022-09-15 VITALS — TEMPERATURE: 98.4 F

## 2022-09-15 DIAGNOSIS — Z46.6: Primary | ICD-10-CM

## 2022-09-15 DIAGNOSIS — E78.5: ICD-10-CM

## 2022-09-15 DIAGNOSIS — R33.9: ICD-10-CM

## 2022-09-15 DIAGNOSIS — E07.9: ICD-10-CM

## 2022-09-15 DIAGNOSIS — G89.29: Primary | ICD-10-CM

## 2022-09-15 DIAGNOSIS — F41.9: ICD-10-CM

## 2022-09-15 DIAGNOSIS — I10: ICD-10-CM

## 2022-09-15 DIAGNOSIS — Z79.899: ICD-10-CM

## 2022-09-15 DIAGNOSIS — Z79.890: ICD-10-CM

## 2022-09-15 DIAGNOSIS — F17.200: ICD-10-CM

## 2022-09-15 DIAGNOSIS — F31.9: ICD-10-CM

## 2022-09-15 LAB
ANION GAP SERPL CALC-SCNC: 10 MMOL/L
BASOPHILS # BLD AUTO: 0 K/UL (ref 0–0.2)
BASOPHILS NFR BLD AUTO: 0 %
BUN SERPL-SCNC: 16 MG/DL (ref 7–17)
CALCIUM SPEC-MCNC: 8.6 MG/DL (ref 8.4–10.2)
CHLORIDE SERPL-SCNC: 102 MMOL/L (ref 98–107)
CO2 SERPL-SCNC: 29 MMOL/L (ref 22–30)
EOSINOPHIL # BLD AUTO: 0.1 K/UL (ref 0–0.7)
EOSINOPHIL NFR BLD AUTO: 2 %
ERYTHROCYTE [DISTWIDTH] IN BLOOD BY AUTOMATED COUNT: 4.63 M/UL (ref 3.8–5.4)
ERYTHROCYTE [DISTWIDTH] IN BLOOD: 12.7 % (ref 11.5–15.5)
GLUCOSE SERPL-MCNC: 113 MG/DL (ref 74–99)
HCT VFR BLD AUTO: 46.3 % (ref 34–46)
HGB BLD-MCNC: 15.1 GM/DL (ref 11.4–16)
LYMPHOCYTES # SPEC AUTO: 1.5 K/UL (ref 1–4.8)
LYMPHOCYTES NFR SPEC AUTO: 24 %
MCH RBC QN AUTO: 32.5 PG (ref 25–35)
MCHC RBC AUTO-ENTMCNC: 32.6 G/DL (ref 31–37)
MCV RBC AUTO: 100 FL (ref 80–100)
MONOCYTES # BLD AUTO: 0.4 K/UL (ref 0–1)
MONOCYTES NFR BLD AUTO: 6 %
NEUTROPHILS # BLD AUTO: 4.2 K/UL (ref 1.3–7.7)
NEUTROPHILS NFR BLD AUTO: 66 %
PH UR: 7 [PH] (ref 5–8)
PLATELET # BLD AUTO: 152 K/UL (ref 150–450)
POTASSIUM SERPL-SCNC: 4.3 MMOL/L (ref 3.5–5.1)
PROT UR QL: (no result)
RBC UR QL: 6 /HPF (ref 0–5)
SODIUM SERPL-SCNC: 141 MMOL/L (ref 137–145)
SP GR UR: 1.02 (ref 1–1.03)
UROBILINOGEN UR QL STRIP: <2 MG/DL (ref ?–2)
WBC # BLD AUTO: 6.3 K/UL (ref 3.8–10.6)
WBC # UR AUTO: 3 /HPF (ref 0–5)

## 2022-09-15 PROCEDURE — 85025 COMPLETE CBC W/AUTO DIFF WBC: CPT

## 2022-09-15 PROCEDURE — 80048 BASIC METABOLIC PNL TOTAL CA: CPT

## 2022-09-15 PROCEDURE — 81001 URINALYSIS AUTO W/SCOPE: CPT

## 2022-09-15 PROCEDURE — 99283 EMERGENCY DEPT VISIT LOW MDM: CPT

## 2022-09-15 PROCEDURE — 96372 THER/PROPH/DIAG INJ SC/IM: CPT

## 2022-09-15 PROCEDURE — 36415 COLL VENOUS BLD VENIPUNCTURE: CPT

## 2022-09-15 PROCEDURE — 51798 US URINE CAPACITY MEASURE: CPT

## 2022-09-15 PROCEDURE — 99284 EMERGENCY DEPT VISIT MOD MDM: CPT

## 2022-09-15 NOTE — ED
General Adult HPI





- General


Chief complaint: Back Pain/Injury


Stated complaint: Recheck-wants cath removed


Time Seen by Provider: 09/15/22 22:35


Source: patient


Mode of arrival: wheelchair


Limitations: no limitations





- History of Present Illness


Initial comments: 


Patient is a 57-year-old female who presents to the emergency department with 

Archuleta catheter issue.  Patient had Archuleta catheter placed into the emergency 

department for urinary retention.  Patient states the Archuleta catheter is hurting 

and wants it out.  Patient states she has emptied the Archuleta catheter bag 3 times

today.  She has not noticed any blood in her urine.  Denies fever, chills, 

abdominal pain, and other concerns.








- Related Data


                                Home Medications











 Medication  Instructions  Recorded  Confirmed


 


Propranolol HCl 80 mg PO DAILY 01/31/22 07/07/22


 


Ziprasidone [Geodon] 80 mg PO BID 01/31/22 07/07/22


 


lamoTRIgine [LaMICtal ODT] 200 mg PO DAILY 04/21/22 07/07/22


 


Atorvastatin [Lipitor] 10 mg PO HS 07/04/22 07/07/22


 


Benztropine Mesylate [Cogentin] 1 mg PO TID 07/04/22 07/07/22


 


Levothyroxine Sodium [Synthroid] 50 mcg PO DAILY 07/04/22 07/07/22


 


hydrOXYzine pamoate [Vistaril] 50 mg PO HS 07/04/22 07/07/22








                                  Previous Rx's











 Medication  Instructions  Recorded


 


amLODIPine [Norvasc] 10 mg PO DAILY 30 Days  tab 06/15/20


 


Lidocaine 5% Patch [Lidoderm 5% 1 patch TOPICAL DAILY 7 Days #7 07/04/22





Patch] patch 


 


Ibuprofen [Motrin] 600 mg PO Q8HR PRN 10 Days #30 tab 07/06/22


 


methylPREDNISolone Dose Pack 4 mg PO AS DIRECTED #1 packet 07/31/22





[Medrol Dose Pack]  











                                    Allergies











Allergy/AdvReac Type Severity Reaction Status Date / Time


 


No Known Allergies Allergy   Verified 09/15/22 09:30














Review of Systems


ROS Statement: 


Those systems with pertinent positive or pertinent negative responses have been 

documented in the HPI.





ROS Other: All systems not noted in ROS Statement are negative.





Past Medical History


Past Medical History: Eye Disorder, Hyperlipidemia, Hypertension, 

Musculoskeletal Disorder, Thyroid Disorder


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 

back problems.


History of Any Multi-Drug Resistant Organisms: None Reported


Past Surgical History: Tubal Ligation


Past Anesthesia/Blood Transfusion Reactions: No Reported Reaction


Past Psychological History: Anxiety, Bipolar, Depression, Schizophrenia


Smoking Status: Current every day smoker


Past Alcohol Use History: None Reported


Past Drug Use History: None Reported





- Past Family History


  ** Father


Family Medical History: Unable to Obtain, Diabetes Mellitus





  ** Mother


Family Medical History: Eye Disorder, Hypertension


Additional Family Medical History / Comment(s): Mother is alive at age 85 with 

history of temporal arteritis, peripheral vascular disease status post carotid 

surgery, blindness in the left eye.





  ** Sister(s)


Additional Family Medical History / Comment(s): Patient has 4 sisters with no 

major medical problems.  Patient has 3 daughters with no major medical problems.





General Exam


Limitations: no limitations


General appearance: alert, in no apparent distress


Head exam: Present: atraumatic, normocephalic, normal inspection


Respiratory exam: Present: normal lung sounds bilaterally.  Absent: respiratory 

distress, wheezes, rales, rhonchi, stridor


Cardiovascular Exam: Present: regular rate, normal rhythm, normal heart sounds. 

Absent: systolic murmur, diastolic murmur, rubs, gallop, clicks


GI/Abdominal exam: Present: soft, normal bowel sounds.  Absent: distended, 

tenderness, guarding, rebound, rigid


Neurological exam: Present: alert, oriented X3, CN II-XII intact


Psychiatric exam: Present: normal affect, normal mood


Skin exam: Present: warm, dry, intact, normal color.  Absent: rash





Course


                                   Vital Signs











  09/15/22





  21:38


 


Temperature 98.1 F


 


Pulse Rate 91


 


Respiratory 20





Rate 


 


Blood Pressure 112/66


 


O2 Sat by Pulse 93 L





Oximetry 














Medical Decision Making





- Medical Decision Making


This is a 67 -year-old female requesting Archuleta catheter removal.  








Catheter removed.  Patient will be discharged with strict return parameters.  

She will follow up with urology and if she experiences urinary retention or new 

or concerning symptoms she will return.








Dr. Prieto is my attending. 














Disposition


Clinical Impression: 


 Archuleta catheter in place





Disposition: HOME SELF-CARE


Condition: Good


Instructions (If sedation given, give patient instructions):  Acute Urinary 

Retention in Women (ED)


Additional Instructions: 


Follow-up with urologist in one to 2 days.  Return to the emergency department 

if you experience new, concerning, or worsening, including but not limited to 

inability to urinate.


Is patient prescribed a controlled substance at d/c from ED?: No


Referrals: 


GITA Ho III, MD [Primary Care Provider] - 1-2 days

## 2022-09-15 NOTE — ED
Female Urogenital HPI





- General


Chief complaint: Urogenital


Stated complaint: vaginal pain


Time Seen by Provider: 09/15/22 11:23


Source: patient


Mode of arrival: wheelchair


Limitations: no limitations





- History of Present Illness


Initial comments: 





67-year-old female seen yesterday for back pain presents today with complaints 

of severe vaginal pain since yesterday.  She has a history of chronic pain and 

does see a chronic pain management doctor.


MD Complaint: pelvic pain


-: days(s) (1)


Severity scale (1-10): 10


Consistency: constant


Improves with: none





- Related Data


                                Home Medications











 Medication  Instructions  Recorded  Confirmed


 


Propranolol HCl 80 mg PO DAILY 01/31/22 07/07/22


 


Ziprasidone [Geodon] 80 mg PO BID 01/31/22 07/07/22


 


lamoTRIgine [LaMICtal ODT] 200 mg PO DAILY 04/21/22 07/07/22


 


Atorvastatin [Lipitor] 10 mg PO HS 07/04/22 07/07/22


 


Benztropine Mesylate [Cogentin] 1 mg PO TID 07/04/22 07/07/22


 


Levothyroxine Sodium [Synthroid] 50 mcg PO DAILY 07/04/22 07/07/22


 


hydrOXYzine pamoate [Vistaril] 50 mg PO HS 07/04/22 07/07/22








                                  Previous Rx's











 Medication  Instructions  Recorded


 


amLODIPine [Norvasc] 10 mg PO DAILY 30 Days  tab 06/15/20


 


Lidocaine 5% Patch [Lidoderm 5% 1 patch TOPICAL DAILY 7 Days #7 07/04/22





Patch] patch 


 


Ibuprofen [Motrin] 600 mg PO Q8HR PRN 10 Days #30 tab 07/06/22


 


methylPREDNISolone Dose Pack 4 mg PO AS DIRECTED #1 packet 07/31/22





[Medrol Dose Pack]  











                                    Allergies











Allergy/AdvReac Type Severity Reaction Status Date / Time


 


No Known Allergies Allergy   Verified 09/15/22 09:30














Review of Systems


ROS Statement: 


Those systems with pertinent positive or pertinent negative responses have been 

documented in the HPI.





ROS Other: All systems not noted in ROS Statement are negative.





Past Medical History


Past Medical History: Eye Disorder, Hyperlipidemia, Hypertension, Musc

uloskeletal Disorder, Thyroid Disorder


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 

back problems.


History of Any Multi-Drug Resistant Organisms: None Reported


Past Surgical History: Tubal Ligation


Past Anesthesia/Blood Transfusion Reactions: No Reported Reaction


Past Psychological History: Anxiety, Bipolar, Depression, Schizophrenia


Smoking Status: Current every day smoker


Past Alcohol Use History: None Reported


Past Drug Use History: None Reported





- Past Family History


  ** Father


Family Medical History: Unable to Obtain, Diabetes Mellitus





  ** Mother


Family Medical History: Eye Disorder, Hypertension


Additional Family Medical History / Comment(s): Mother is alive at age 85 with 

history of temporal arteritis, peripheral vascular disease status post carotid 

surgery, blindness in the left eye.





  ** Sister(s)


Additional Family Medical History / Comment(s): Patient has 4 sisters with no 

major medical problems.  Patient has 3 daughters with no major medical problems.





General Exam


Limitations: no limitations


General appearance: alert, in no apparent distress


Head exam: Present: atraumatic


Eye exam: Absent: periorbital swelling


Neck exam: Present: full ROM.  Absent: tenderness, meningismus


Respiratory exam: Absent: respiratory distress, accessory muscle use


Cardiovascular Exam: Present: regular rate


GI/Abdominal exam: Present: soft.  Absent: distended, tenderness, guarding, 

rebound, rigid


Speculum exam: Present: normal speculum exam.  Absent: erythema, vaginal 

discharge, cervical discharge, vaginal bleeding, foreign body, tissue, 

laceration


Extremities exam: Present: normal capillary refill


Neurological exam: Present: alert, oriented X3


Psychiatric exam: Present: normal affect, normal mood


Skin exam: Present: warm, dry.  Absent: cyanosis, diaphoretic





Course


                                   Vital Signs











  09/15/22 09/15/22 09/15/22





  09:26 13:05 14:48


 


Temperature 98.4 F  


 


Pulse Rate 89 86 77


 


Respiratory 20 20 20





Rate   


 


Blood Pressure 140/77 140/98 140/70


 


O2 Sat by Pulse 93 L 98 99





Oximetry   














- Reevaluation(s)


Reevaluation #1: 





09/15/22 14:22


Patient appears comfortable on cart testing her sister on the phone.  She is 

requesting pain medication


Time: 13:30





Medical Decision Making





- Medical Decision Making





67-year-old female presents with 1 day of vaginal pain.  Was seen yesterday in 

the emergency room for chronic back pain.  States that she was brought back to 

the emergency room by her daughter today due to severe vaginal pain.  She denies

any vaginal discharge.  No dysuria.  She is in the care of Dr. Carpenter, Dr. Chatterjee and Dr. Goddard.  She was scheduled to see her pain management doctor 

today but had her daughter bring her to the ER instead. Patient ambualted to 

bathroom with RN but states that she is unable to urinate.  Bladder scan was 

performed showing retention 400ml.  Archuleta catheter was placed and urine was sent

for testing which revealed no infection.  


Pelvic exam was performed with no abnormalities, no vaginal discharge or vaginal

bleeding.  Patient was able to lift her hips up off the cart by bending both 

knees.  


Patient states that she is not having vaginal pain that she misstated actually 

having left groin pain.  


Labs were sent, CBC shows no evidence of leukocytosis.  Kidney function within 

normal limits.  Patient will be discharged home with Archuleta catheter for 

retention and directed to continue her previously prescribed pain medication. 

She was directed to follow-up with urology for urinary retention this week.


Vital signs stable. Case discussed with Dr. Joseph





- Lab Data


Result diagrams: 


                                 09/15/22 13:59





                                 09/15/22 13:59


                                   Lab Results











  09/15/22 09/15/22 09/15/22 Range/Units





  13:31 13:59 13:59 


 


WBC   6.3   (3.8-10.6)  k/uL


 


RBC   4.63   (3.80-5.40)  m/uL


 


Hgb   15.1   (11.4-16.0)  gm/dL


 


Hct   46.3 H   (34.0-46.0)  %


 


MCV   100.0   (80.0-100.0)  fL


 


MCH   32.5   (25.0-35.0)  pg


 


MCHC   32.6   (31.0-37.0)  g/dL


 


RDW   12.7   (11.5-15.5)  %


 


Plt Count   152   (150-450)  k/uL


 


MPV   8.3   


 


Neutrophils %   66   %


 


Lymphocytes %   24   %


 


Monocytes %   6   %


 


Eosinophils %   2   %


 


Basophils %   0   %


 


Neutrophils #   4.2   (1.3-7.7)  k/uL


 


Lymphocytes #   1.5   (1.0-4.8)  k/uL


 


Monocytes #   0.4   (0-1.0)  k/uL


 


Eosinophils #   0.1   (0-0.7)  k/uL


 


Basophils #   0.0   (0-0.2)  k/uL


 


Sodium    141  (137-145)  mmol/L


 


Potassium    4.3  (3.5-5.1)  mmol/L


 


Chloride    102  ()  mmol/L


 


Carbon Dioxide    29  (22-30)  mmol/L


 


Anion Gap    10  mmol/L


 


BUN    16  (7-17)  mg/dL


 


Creatinine    0.59  (0.52-1.04)  mg/dL


 


Est GFR (CKD-EPI)AfAm    >90  (>60 ml/min/1.73 sqM)  


 


Est GFR (CKD-EPI)NonAf    >90  (>60 ml/min/1.73 sqM)  


 


Glucose    113 H  (74-99)  mg/dL


 


Calcium    8.6  (8.4-10.2)  mg/dL


 


Urine Color  Yellow    


 


Urine Appearance  Cloudy H    (Clear)  


 


Urine pH  7.0    (5.0-8.0)  


 


Ur Specific Gravity  1.017    (1.001-1.035)  


 


Urine Protein  1+ H    (Negative)  


 


Urine Glucose (UA)  Negative    (Negative)  


 


Urine Ketones  Negative    (Negative)  


 


Urine Blood  Small H    (Negative)  


 


Urine Nitrite  Negative    (Negative)  


 


Urine Bilirubin  Negative    (Negative)  


 


Urine Urobilinogen  <2.0    (<2.0)  mg/dL


 


Ur Leukocyte Esterase  Negative    (Negative)  


 


Urine RBC  6 H    (0-5)  /hpf


 


Urine WBC  3    (0-5)  /hpf


 


Urine Mucus  Rare H    (None)  /hpf














Disposition


Clinical Impression: 


 Chronic pain, Acute urinary retention





Disposition: HOME SELF-CARE


Instructions (If sedation given, give patient instructions):  Pain Management in

Older Adults (DC), Acute Urinary Retention in Women (ED)


Additional Instructions: 


Follow-up with urology tomorrow.  Continue your previously prescribed pain 

medication regimen and follow-up with your primary care doctor and pain 

management doctor for chronic pain.  


Return to the emergency room with any fevers, persistent nausea and vomiting or 

no urine drainage in catheter bag.


Is patient prescribed a controlled substance at d/c from ED?: No


Referrals: 


GITA Ho III, MD [Primary Care Provider] - 1-2 days


Moose Mccormack MD [STAFF PHYSICIAN] - 1-2 days


Time of Disposition: 14:41

## 2023-01-01 ENCOUNTER — HOSPITAL ENCOUNTER (EMERGENCY)
Dept: HOSPITAL 47 - EC | Age: 68
Discharge: HOME | End: 2023-01-01
Payer: MEDICARE

## 2023-01-01 VITALS
SYSTOLIC BLOOD PRESSURE: 133 MMHG | DIASTOLIC BLOOD PRESSURE: 68 MMHG | HEART RATE: 82 BPM | TEMPERATURE: 98.4 F | RESPIRATION RATE: 18 BRPM

## 2023-01-01 DIAGNOSIS — F17.200: ICD-10-CM

## 2023-01-01 DIAGNOSIS — I10: ICD-10-CM

## 2023-01-01 DIAGNOSIS — F31.9: ICD-10-CM

## 2023-01-01 DIAGNOSIS — M54.50: Primary | ICD-10-CM

## 2023-01-01 DIAGNOSIS — F41.9: ICD-10-CM

## 2023-01-01 PROCEDURE — 96372 THER/PROPH/DIAG INJ SC/IM: CPT

## 2023-01-01 PROCEDURE — 99283 EMERGENCY DEPT VISIT LOW MDM: CPT

## 2023-01-01 NOTE — ED
General Adult HPI





- General


Chief complaint: Back Pain/Injury


Stated complaint: back pain


Time Seen by Provider: 01/01/23 12:15


Source: patient, RN notes reviewed, old records reviewed


Mode of arrival: wheelchair


Limitations: no limitations





- History of Present Illness


Initial comments: 





Patient is a 67-year-old female with past medical history remarkable for 

hypertension, chronic lower back pain who presents emergency Department with 

acute exacerbation chronic low back pain.  Denies any new injuries.  Has a 

history of this low back pain.  Uses tramadol at home as well as receives 

injections in her lower spine without improvement lately.  Last saw her spine 

doctor, Dr. Goddard last month.  States she is due for follow-up appointment.  

States she has been having chronic low back pain.  Pain has been bothering her 

more over the last few days but has been an issue for multiple weeks.  No new 

injury.  Denies any saddle anesthesia or numbness in her legs.  Denies any 

weakness in her legs but does state that his worse pain when walking.  Denies 

any urinary or bowel retention or incontinence.  Has no other acute complaint at

this time including denying fevers.  Denies abdominal pain, chest pain, 

shortness breath, nausea, vomiting, diarrhea.  Denies any sick contacts.  Was 

seen at [facility] yesterday for similar complaints, received by mouth 

steroids, as well as a Toradol injection which did seem to help some but did 

wear off which is why she presents today for further evaluation.  She is already

on muscle relaxers at home.  Is seeking a dose of pain medication.





- Related Data


                                Home Medications











 Medication  Instructions  Recorded  Confirmed


 


Propranolol HCl 80 mg PO DAILY 01/31/22 07/07/22


 


Ziprasidone [Geodon] 80 mg PO BID 01/31/22 07/07/22


 


lamoTRIgine [LaMICtal ODT] 200 mg PO DAILY 04/21/22 07/07/22


 


Atorvastatin [Lipitor] 10 mg PO HS 07/04/22 07/07/22


 


Benztropine Mesylate [Cogentin] 1 mg PO TID 07/04/22 07/07/22


 


Levothyroxine Sodium [Synthroid] 50 mcg PO DAILY 07/04/22 07/07/22


 


hydrOXYzine pamoate [Vistaril] 50 mg PO HS 07/04/22 07/07/22








                                  Previous Rx's











 Medication  Instructions  Recorded


 


amLODIPine [Norvasc] 10 mg PO DAILY 30 Days  tab 06/15/20


 


Lidocaine 5% Patch [Lidoderm 5% 1 patch TOPICAL DAILY 7 Days #7 07/04/22





Patch] patch 


 


Ibuprofen [Motrin] 600 mg PO Q8HR PRN 10 Days #30 tab 07/06/22


 


methylPREDNISolone Dose Pack 4 mg PO AS DIRECTED #1 packet 07/31/22





[Medrol Dose Pack]  


 


Lidocaine 5% Patch [Lidoderm 5% 1 patch TOPICAL DAILY PRN 7 Days 01/01/23





Patch] #7 patch 











                                    Allergies











Allergy/AdvReac Type Severity Reaction Status Date / Time


 


No Known Allergies Allergy   Verified 01/01/23 11:53














Review of Systems


ROS Statement: 


Those systems with pertinent positive or pertinent negative responses have been 

documented in the HPI.


Review of Systems:


CONST: Denies fever 


EYES: Denies blurry vision 


ENT: Denies nasal congestion  


C/V:  Denies Chest pain


RESP: Denies shortness of breath 


GI: Denies abdominal pain 


: Denies dysuria  


SKIN: Denies rash.


MSK: Endorses chronic lower back pain


NEURO: Denies headache 


ROS Other: All systems not noted in ROS Statement are negative.





Past Medical History


Past Medical History: Eye Disorder, Hyperlipidemia, Hypertension, 

Musculoskeletal Disorder, Thyroid Disorder


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 

back problems.


History of Any Multi-Drug Resistant Organisms: None Reported


Past Surgical History: Tubal Ligation


Past Anesthesia/Blood Transfusion Reactions: No Reported Reaction


Past Psychological History: Anxiety, Bipolar, Depression, Schizophrenia


Smoking Status: Current every day smoker


Past Alcohol Use History: None Reported


Past Drug Use History: None Reported





- Past Family History


  ** Father


Family Medical History: Unable to Obtain, Diabetes Mellitus





  ** Mother


Family Medical History: Eye Disorder, Hypertension


Additional Family Medical History / Comment(s): Mother is alive at age 85 with 

history of temporal arteritis, peripheral vascular disease status post carotid 

surgery, blindness in the left eye.





  ** Sister(s)


Additional Family Medical History / Comment(s): Patient has 4 sisters with no 

major medical problems.  Patient has 3 daughters with no major medical problems.





General Exam





- General Exam Comments


Initial Comments: 





General: Appears in no acute distress.


HEAD:  Normal with no signs of head trauma.


EYES:  PERRLA, EOMI, conjunctiva normal, no discharge.


ENT:  Hearing grossly intact, normal oropharynx.


RESPIRATORY: Clear breath sounds bilaterally, no respiratory distress.


C/V:  Regular rate and rhythm. S1 and S2 auscultated, no edema, peripheral 

pulses 2+ and intact throughout


ABD:  Abd is soft, nontender, nondistended


EXT: Normal range of motion, no obvious deformity.  Pelvis is stable.  Mild 

lower back pain to palpation in the lumbar spine, primarily in the bilateral 

paraspinal muscles.  No cervical or thoracic spine tenderness to palpation.


SKIN:  No rashes or lesions observed on exposed skin.


NEURO: Alert and oriented 4 no focal deficits.  No focal sensory strength 

deficits.  Able to move bilateral lower extremities without issue.


Limitations: no limitations





Course


                                   Vital Signs











  01/01/23





  11:50


 


Temperature 98.4 F


 


Pulse Rate 82


 


Respiratory 18





Rate 


 


Blood Pressure 133/68


 


O2 Sat by Pulse 98





Oximetry 














Medical Decision Making





- Medical Decision Making





Was pt. sent in by a medical professional or institution?


@  -No


Did you speak to anyone other than the patient for history?  


@  -No


Did you review nursing and triage notes? 


@  -Yes.  I agree.


Were old charts reviewed? 


@  -No


Differential Diagnosis? 


@  -Differential Back Pain:


Strain, zoster, cauda equina syndrome, epidural abscess, vertebral 

osteomyelitis, discitis, fracture, subluxation, disc herniation, DJD, spinal 

stenosis, dissection, AAA, pancreatitis, peptic ulcer disease, pyelonephritis, 

kidney stone, this is not meant to be an all-inclusive list.





EKG interpreted by me (3pts min.)?


@  -none


X-rays interpreted by me (1pt min.)?


@  -none


CT interpreted by me (1pt min.)?


@  -none


U/S interpreted by me (1pt. min.)?


@  -none


What testing was considered but not performed? (CT, X-rays, U/S, labs)? Why?


@X-rays of the lower spine.  This was not done as the patient has no acute 

complaints.  She self 6. this is a chronic exacerbation of her typical pain.  No

evidence of cauda equina syndrome.  No trauma.


What meds were considered but not given? Why?


@  -none


Did you discuss the management of the patient with other professionals?


@  -None


Did you reconcile home meds?


@  -Yes


Was smoking cessation discussed for >3mins.?


@  -none


Was critical care preformed (if so, how long)?


@  -none


Were there social determinants of health that impacted care today? How? 

(Homelessness, low income, unemployed, alcoholism, drug addiction, 

transportation, low edu. Level, literacy, decrease access to med. care, custodial, 

rehab)?


@  -None


Was there de-escalation of care discussed even if they declined? (Discuss DNR or

withdrawal of care, Hospice)?


@  -No


What co-morbidities impacted this encounter? (DM, HTN, Smoking, COPD, CAD, 

Cancer, CVA, Hep., AIDS, mental health diagnosis, sleep apnea, morbid obesity)?


@  -Chronic lower back pain


Was patient admitted / discharged?


@  -Based on patient's presentation and physical exam, she presents for an acute

exacerbation of her chronic low back pain.  She has no signs or symptoms of 

cauda equina syndrome, no new symptoms at this time.  Is seeking a dose of pain 

medications.  States she is on muscle relaxers at home.  Was seen yesterday for 

similar complaint.  States this does happen occasionally.  Does follow up with 

specialist, Dr. Goddard.  We did discuss that she needs to follow up with Dr. Goddard for further pain management.  I cannot prescribe her list of medications

from the emergency department but I can provide her with a one-time dose in 

addition to time Toradol as well as lidocaine patch.  She was in agreement this 

plan.  No concern for cauda equina syndrome at this time.  This seems to be an 

acute exacerbation of chronic pain.  She was in agreement with this plan.  I 

answered all questions that she had.  Strict return precautions were discussed.





I will provide the patient with a prescription for lidocaine patch. I instructed

the patient to follow up with their PCP in the next 1-3 days.  I explained that 

the patient should return to the emergency department if they experience any 

worsening symptoms. Strict return precautions were discussed with the patient. 

The patient expressed understanding of these instructions. I answered all 

questions that the patient had. The patient was discharged home in good 

condition with their prescriptions and follow up information.








Undiagnosed new problem with uncertain prognosis?


@  -none


Drug Therapy requiring intensive monitoring for toxicity (Heparin, Nitro, 

Insulin, Cardizem)?


@  -none


Were any procedures done?


@  -none


Diagnosis/symptom?


@  -Acute on chronic back pain


Acute, or Chronic, or Acute on Chronic?


@  -Acute on chronic


Uncomplicated (without systemic symptoms) or Complicated (systemic symptoms)?


@  -Uncomplicated


Side effects of treatment?


@  -none


Exacerbation, Progression, or Severe Exacerbation]


@  -no


Poses a threat to life or bodily function?


@  -no





Disposition


Clinical Impression: 


 Acute exacerbation of chronic low back pain





Disposition: ADMITTED AS IP TO THIS HOSP


Condition: Good


Instructions (If sedation given, give patient instructions):  Chronic Pain (ED)


Prescriptions: 


Lidocaine 5% Patch [Lidoderm 5% Patch] 1 patch TOPICAL DAILY PRN 7 Days #7 patch


 PRN Reason: Pain


Is patient prescribed a controlled substance at d/c from ED?: No


Referrals: 


GITA Ho III, MD [Primary Care Provider] - 1-2 days


Time of Disposition: 12:29

## 2023-02-07 NOTE — US
EXAMINATION TYPE: US liver

 

DATE OF EXAM: 8/14/2019

 

COMPARISON: NONE

 

CLINICAL HISTORY: R94.5 Abnormal results of liver function studies. abn labs

 

EXAM MEASUREMENTS:

 

Liver Length:  18.7 cm   

Gallbladder Wall:  0.1 cm   

CBD:  0.7 cm

Right Kidney:  10.7 x 4.1 x 4.6 cm

 

 

 

Pancreas:  Tail not well visualized due to overlying bowel gas.  

Liver:  There is increased echogenicity of the hepatic parenchyma with diminished visualization of th
e portal triads most commonly relating to hepatic steatosis and limiting evaluation for underlying he
patic masses. 

Gallbladder:  wnl

**Evidence for sonographic Echevarria's sign:  neg

CBD:  upper limits of normal  

Right Kidney:  wnl 

 

 

 

IMPRESSION: 

1. Sonographic findings most commonly related to hepatic steatosis. Correlate with liver function liz
ts.

2. No sonographic evidence of cholelithiasis nor acute cholecystitis. Ketoconazole Pregnancy And Lactation Text: This medication is Pregnancy Category C and it isn't know if it is safe during pregnancy. It is also excreted in breast milk and breast feeding isn't recommended.

## 2023-04-23 ENCOUNTER — HOSPITAL ENCOUNTER (EMERGENCY)
Dept: HOSPITAL 47 - EC | Age: 68
Discharge: HOME | End: 2023-04-23
Payer: MEDICARE

## 2023-04-23 VITALS
HEART RATE: 95 BPM | RESPIRATION RATE: 18 BRPM | SYSTOLIC BLOOD PRESSURE: 131 MMHG | DIASTOLIC BLOOD PRESSURE: 57 MMHG | TEMPERATURE: 98 F

## 2023-04-23 DIAGNOSIS — Z79.899: ICD-10-CM

## 2023-04-23 DIAGNOSIS — F17.200: ICD-10-CM

## 2023-04-23 DIAGNOSIS — Z79.890: ICD-10-CM

## 2023-04-23 DIAGNOSIS — F31.9: ICD-10-CM

## 2023-04-23 DIAGNOSIS — E07.9: ICD-10-CM

## 2023-04-23 DIAGNOSIS — F41.9: ICD-10-CM

## 2023-04-23 DIAGNOSIS — E78.5: ICD-10-CM

## 2023-04-23 DIAGNOSIS — Z88.6: ICD-10-CM

## 2023-04-23 DIAGNOSIS — M54.50: Primary | ICD-10-CM

## 2023-04-23 DIAGNOSIS — I10: ICD-10-CM

## 2023-04-23 PROCEDURE — 99283 EMERGENCY DEPT VISIT LOW MDM: CPT

## 2023-04-23 NOTE — ED
General Adult HPI





- General


Chief complaint: Back Pain/Injury


Stated complaint: Back pain, leg pain


Time Seen by Provider: 04/23/23 10:27


Source: patient


Mode of arrival: ambulatory


Limitations: no limitations





- History of Present Illness


Initial comments: 


Dictation was produced using dragon dictation software. please excuse any 

grammatical, word or spelling errors. 











Chief Complaint: 67-year-old female with chronic back pain requesting that her 

pain medication





History of Present Illness: 67-year-old female she has history of chronic back 

pain.  3 days ago she had spinal injections done at pain specialists office.  

States that the injections did not help.  States that her back feels like her 

usual back pain.  Denies any fever.  Patient's ambulatory perform activities of 

daily living.  She is prescribed Tylenol number threes and tramadol the past 

that did not really pass her relief.  Patient states that very well.  Denies any

fever or constitutional symptoms.  States her symptoms.  Denies any trauma or 

inciting event.





The ROS documented in this emergency department record has been reviewed and 

confirmed by me.  Those systems with pertinent positive or negative responses 

have been documented in the HPI.  All other systems are other negative and/or 

noncontributory.

















- Related Data


                                Home Medications











 Medication  Instructions  Recorded  Confirmed


 


Propranolol HCl 80 mg PO DAILY 01/31/22 07/07/22


 


Ziprasidone [Geodon] 80 mg PO BID 01/31/22 07/07/22


 


lamoTRIgine [LaMICtal ODT] 200 mg PO DAILY 04/21/22 07/07/22


 


Atorvastatin [Lipitor] 10 mg PO HS 07/04/22 07/07/22


 


Benztropine Mesylate [Cogentin] 1 mg PO TID 07/04/22 07/07/22


 


Levothyroxine Sodium [Synthroid] 50 mcg PO DAILY 07/04/22 07/07/22


 


hydrOXYzine pamoate [Vistaril] 50 mg PO HS 07/04/22 07/07/22








                                  Previous Rx's











 Medication  Instructions  Recorded


 


amLODIPine [Norvasc] 10 mg PO DAILY 30 Days  tab 06/15/20


 


Lidocaine 5% Patch [Lidoderm 5% 1 patch TOPICAL DAILY 7 Days #7 07/04/22





Patch] patch 


 


Ibuprofen [Motrin] 600 mg PO Q8HR PRN 10 Days #30 tab 07/06/22


 


methylPREDNISolone Dose Pack 4 mg PO AS DIRECTED #1 packet 07/31/22





[Medrol Dose Pack]  


 


Lidocaine 5% Patch [Lidoderm 5% 1 patch TOPICAL DAILY PRN 7 Days 01/01/23





Patch] #7 patch 


 


predniSONE 50 mg PO DAILY #5 tab 03/03/23











                                    Allergies











Allergy/AdvReac Type Severity Reaction Status Date / Time


 


ibuprofen [From Motrin IB] Allergy  Dyspnea Verified 04/23/23 09:42














Review of Systems


ROS Statement: 


Those systems with pertinent positive or pertinent negative responses have been 

documented in the HPI.





ROS Other: All systems not noted in ROS Statement are negative.





Past Medical History


Past Medical History: Eye Disorder, Hyperlipidemia, Hypertension, 

Musculoskeletal Disorder, Thyroid Disorder


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 

back problems.


History of Any Multi-Drug Resistant Organisms: None Reported


Past Surgical History: Tubal Ligation


Past Anesthesia/Blood Transfusion Reactions: No Reported Reaction


Past Psychological History: Anxiety, Bipolar, Depression, Schizophrenia


Smoking Status: Current every day smoker


Past Alcohol Use History: None Reported


Past Drug Use History: None Reported





- Past Family History


  ** Father


Family Medical History: Unable to Obtain, Diabetes Mellitus





  ** Mother


Family Medical History: Eye Disorder, Hypertension


Additional Family Medical History / Comment(s): Mother is alive at age 85 with 

history of temporal arteritis, peripheral vascular disease status post carotid 

surgery, blindness in the left eye.





  ** Sister(s)


Additional Family Medical History / Comment(s): Patient has 4 sisters with no 

major medical problems.  Patient has 3 daughters with no major medical problems.





General Exam





- General Exam Comments


Initial Comments: 











PHYSICAL EXAM:


General Impression: Alert and oriented x3, not in acute distress


HEENT: Normocephalic atraumatic, extra-ocular movements intact, pupils equal and

reactive to light bilaterally, mucous membranes moist.


Cardiovascular: Heart regular rate and rhythm


Chest: Able to complete full sentences, no retractions, no tachypnea


Musculoskeletal: Pulses present and equal in all extremities, no peripheral 

edema


Motor:  no focal deficits noted


Neurological: CN II-XII grossly intact, no focal motor or sensory deficits noted


Skin: Intact with no visualized rashes


Psych: Normal affect and mood








Limitations: no limitations





Course





                                   Vital Signs











  04/23/23





  09:40


 


Temperature 98 F


 


Pulse Rate 95


 


Respiratory 18





Rate 


 


Blood Pressure 131/57


 


O2 Sat by Pulse 94 L





Oximetry 














Medical Decision Making





- Medical Decision Making


Was pt. sent in by a medical professional or institution (CLARENCE Banks, NP, urgent 

care, hospital, or nursing home...) When possible be specific


@  -No


Did you speak to anyone other than the patient for history (EMS, parent, family,

police, friend...)? What history was obtained from this source 


@  -No


Did you review nursing and triage notes (agree or disagree)?  Why? 


@  -I reviewed and agree with nursing and triage notes


Were old charts reviewed (outside hosp., previous admission, EMS record, old 

EKG, old radiological studies, urgent care reports/EKG's, nursing home records)?

Report findings 


@  -No old charts were reviewed


Differential Diagnosis (chest pain, altered mental status, abdominal pain women,

abdominal pain men, vaginal bleeding, musculoskeletal, weakness, fever, dyspnea,

syncope, headache, dizziness, GI bleed, back pain, seizure, CVA, palpatations, 

mental health)? 


@  -Differential Back Pain:


Strain, zoster, cauda equina syndrome, epidural abscess, vertebral 

osteomyelitis, discitis, fracture, subluxation, disc herniation, DJD, spinal 

stenosis, dissection, AAA, pancreatitis, peptic ulcer disease, pyelonephritis, 

kidney stone, this is not meant to be an all-inclusive list.





EKG interpreted by me (3pts min.).


@  -None done


X-rays interpreted by me (1pt min.).


@  -None done


CT interpreted by me (1pt min.).


@  -None done


U/S interpreted by me (1pt. min.).


@  -None done


What testing was considered but not performed or refused? (CT, X-rays, U/S, 

labs)? Why?


@  -None


What meds were considered but not given or refused? Why?


@  -None


Did you discuss the management of the patient with other professionals 

(professionals i.e. CLARENCE Banks, NP, lab, RT, psych nurse, , , 

teacher, , )? Give summary


@  -No


Was smoking cessation discussed for >3mins.?


@  -No


Was critical care preformed (if so, how long)?


@  -No


Were there social determinants of health that impacted care today? How? 

(Homelessness, low income, unemployed, alcoholism, drug addiction, 

transportation, low edu. Level, literacy, decrease access to med. care, shelter, 

rehab)?


@  -No


Was there de-escalation of care discussed even if they declined (Discuss DNR or 

withdrawal of care, Hospice)? DNR status


@  -No


What co-morbidities impacted this encounter? (DM, HTN, Smoking, COPD, CAD, 

Cancer, CVA, ARF, Chemo, Hep., AIDS, mental health diagnosis, sleep apnea, 

morbid obesity)?


@  -None


Was patient admitted / discharged? Hospital course, mention meds given and 

route, prescriptions, significant lab abnormalities, going to OR and other 

pertinent info.


@  -67-year-old female presents to the emergency department for acute on chronic

back pain.  Patient has no high-risk features.  Vital signs upon arrival are 

within acceptable limits.  Patient evaluated bedside in acute distress.  She is 

functional.  Patient given dose of norco and discharged. [facility] report shows 

patient recently had 30 day supply of tylenol with codeine prescribed by PCP.


Undiagnosed new problem with uncertain prognosis?


@  -No


Drug Therapy requiring intensive monitoring for toxicity (Heparin, Nitro, 

Insulin, Cardizem)?


@  -No


Were any procedures done?


@  -No


Diagnosis/symptom?  Acute, or Chronic, or Acute on Chronic?  Uncomplicated 

(without systemic symptoms) or Complicated (systemic symptoms)?


@  -1.  Acute on chronic back pain


Side effects of treatment?


@  -No


Exacerbation, Progression, or Severe Exacerbation?


@  -No


Poses a threat to life or bodily function? How? (Chest pain, USA, MI, pneumonia,

PE, COPD, DKA, ARF, appy, cholecystitis, CVA, Diverticulitis, Homicidal, 

Suicidal, threat to staff... and all critical care pts)


@  -No








Disposition


Clinical Impression: 


 Back pain





Disposition: HOME SELF-CARE


Condition: Good


Instructions (If sedation given, give patient instructions):  Acute Low Back 

Pain (ED)


Is patient prescribed a controlled substance at d/c from ED?: Yes


Referrals: 


GITA Ho III, MD [Primary Care Provider] - 1-2 days